# Patient Record
Sex: MALE | HISPANIC OR LATINO | Employment: FULL TIME | URBAN - METROPOLITAN AREA
[De-identification: names, ages, dates, MRNs, and addresses within clinical notes are randomized per-mention and may not be internally consistent; named-entity substitution may affect disease eponyms.]

---

## 2022-05-19 ENCOUNTER — HOSPITAL ENCOUNTER (EMERGENCY)
Facility: CLINIC | Age: 32
Discharge: HOME | End: 2022-05-19
Attending: FAMILY MEDICINE | Admitting: FAMILY MEDICINE

## 2022-05-19 ENCOUNTER — APPOINTMENT (OUTPATIENT)
Dept: CT IMAGING | Facility: CLINIC | Age: 32
DRG: 871 | End: 2022-05-19
Attending: EMERGENCY MEDICINE

## 2022-05-19 ENCOUNTER — HOSPITAL ENCOUNTER (INPATIENT)
Facility: CLINIC | Age: 32
LOS: 3 days | Discharge: HOME | DRG: 871 | End: 2022-05-23
Attending: EMERGENCY MEDICINE | Admitting: NURSE PRACTITIONER

## 2022-05-19 ENCOUNTER — APPOINTMENT (OUTPATIENT)
Dept: CT IMAGING | Facility: CLINIC | Age: 32
End: 2022-05-19
Attending: EMERGENCY MEDICINE

## 2022-05-19 VITALS
RESPIRATION RATE: 18 BRPM | OXYGEN SATURATION: 94 % | BODY MASS INDEX: 25.23 KG/M2 | DIASTOLIC BLOOD PRESSURE: 66 MMHG | HEIGHT: 66 IN | HEART RATE: 112 BPM | TEMPERATURE: 98.9 F | WEIGHT: 157 LBS | SYSTOLIC BLOOD PRESSURE: 108 MMHG

## 2022-05-19 DIAGNOSIS — E86.0 DEHYDRATION: ICD-10-CM

## 2022-05-19 DIAGNOSIS — R65.20 SEVERE SEPSIS (H): Primary | ICD-10-CM

## 2022-05-19 DIAGNOSIS — R11.2 NAUSEA AND VOMITING, INTRACTABILITY OF VOMITING NOT SPECIFIED, UNSPECIFIED VOMITING TYPE: ICD-10-CM

## 2022-05-19 DIAGNOSIS — R11.2 NON-INTRACTABLE VOMITING WITH NAUSEA, UNSPECIFIED VOMITING TYPE: ICD-10-CM

## 2022-05-19 DIAGNOSIS — G00.9 BACTERIAL MENINGITIS: ICD-10-CM

## 2022-05-19 DIAGNOSIS — A41.9 SEVERE SEPSIS (H): Primary | ICD-10-CM

## 2022-05-19 DIAGNOSIS — E87.6 HYPOPOTASSEMIA: ICD-10-CM

## 2022-05-19 DIAGNOSIS — E87.6 HYPOKALEMIA: ICD-10-CM

## 2022-05-19 DIAGNOSIS — G00.8 ANAEROBIC MENINGITIS: ICD-10-CM

## 2022-05-19 DIAGNOSIS — Z11.52 ENCOUNTER FOR SCREENING LABORATORY TESTING FOR SEVERE ACUTE RESPIRATORY SYNDROME CORONAVIRUS 2 (SARS-COV-2): ICD-10-CM

## 2022-05-19 DIAGNOSIS — R59.1 LYMPHADENOPATHY: ICD-10-CM

## 2022-05-19 LAB
ALBUMIN SERPL-MCNC: 4.3 G/DL (ref 3.4–5)
ALBUMIN UR-MCNC: NEGATIVE MG/DL
ALBUMIN UR-MCNC: NEGATIVE MG/DL
ALP SERPL-CCNC: 84 U/L (ref 40–150)
ALT SERPL W P-5'-P-CCNC: 61 U/L (ref 0–70)
ANION GAP SERPL CALCULATED.3IONS-SCNC: 7 MMOL/L (ref 3–14)
ANION GAP SERPL CALCULATED.3IONS-SCNC: 9 MMOL/L (ref 3–14)
APPEARANCE CSF: ABNORMAL
APPEARANCE UR: CLEAR
APPEARANCE UR: CLEAR
AST SERPL W P-5'-P-CCNC: 34 U/L (ref 0–45)
BASOPHILS # BLD AUTO: 0 10E3/UL (ref 0–0.2)
BASOPHILS # BLD AUTO: 0 10E3/UL (ref 0–0.2)
BASOPHILS NFR BLD AUTO: 0 %
BASOPHILS NFR BLD AUTO: 0 %
BILIRUB SERPL-MCNC: 0.6 MG/DL (ref 0.2–1.3)
BILIRUB UR QL STRIP: NEGATIVE
BILIRUB UR QL STRIP: NEGATIVE
BUN SERPL-MCNC: 13 MG/DL (ref 7–30)
BUN SERPL-MCNC: 19 MG/DL (ref 7–30)
CALCIUM SERPL-MCNC: 8.5 MG/DL (ref 8.5–10.1)
CALCIUM SERPL-MCNC: 8.8 MG/DL (ref 8.5–10.1)
CHLORIDE BLD-SCNC: 108 MMOL/L (ref 94–109)
CHLORIDE BLD-SCNC: 108 MMOL/L (ref 94–109)
CO2 SERPL-SCNC: 24 MMOL/L (ref 20–32)
CO2 SERPL-SCNC: 25 MMOL/L (ref 20–32)
COLOR CSF: COLORLESS
COLOR UR AUTO: ABNORMAL
COLOR UR AUTO: YELLOW
CREAT SERPL-MCNC: 0.88 MG/DL (ref 0.66–1.25)
CREAT SERPL-MCNC: 0.9 MG/DL (ref 0.66–1.25)
CRP SERPL-MCNC: 6.1 MG/L (ref 0–8)
CRP SERPL-MCNC: 69 MG/L (ref 0–8)
EOSINOPHIL # BLD AUTO: 0 10E3/UL (ref 0–0.7)
EOSINOPHIL # BLD AUTO: 0 10E3/UL (ref 0–0.7)
EOSINOPHIL NFR BLD AUTO: 0 %
EOSINOPHIL NFR BLD AUTO: 0 %
ERYTHROCYTE [DISTWIDTH] IN BLOOD BY AUTOMATED COUNT: 12.7 % (ref 10–15)
ERYTHROCYTE [DISTWIDTH] IN BLOOD BY AUTOMATED COUNT: 12.9 % (ref 10–15)
FLUAV RNA SPEC QL NAA+PROBE: NEGATIVE
FLUBV RNA RESP QL NAA+PROBE: NEGATIVE
GFR SERPL CREATININE-BSD FRML MDRD: >90 ML/MIN/1.73M2
GFR SERPL CREATININE-BSD FRML MDRD: >90 ML/MIN/1.73M2
GLUCOSE BLD-MCNC: 139 MG/DL (ref 70–99)
GLUCOSE BLD-MCNC: 185 MG/DL (ref 70–99)
GLUCOSE CSF-MCNC: 68 MG/DL (ref 40–70)
GLUCOSE UR STRIP-MCNC: 50 MG/DL
GLUCOSE UR STRIP-MCNC: NEGATIVE MG/DL
HBA1C MFR BLD: 5.5 % (ref 0–5.6)
HCT VFR BLD AUTO: 41.1 % (ref 40–53)
HCT VFR BLD AUTO: 42 % (ref 40–53)
HGB BLD-MCNC: 14.4 G/DL (ref 13.3–17.7)
HGB BLD-MCNC: 14.8 G/DL (ref 13.3–17.7)
HGB UR QL STRIP: NEGATIVE
HGB UR QL STRIP: NEGATIVE
IMM GRANULOCYTES # BLD: 0.1 10E3/UL
IMM GRANULOCYTES # BLD: 0.1 10E3/UL
IMM GRANULOCYTES NFR BLD: 0 %
IMM GRANULOCYTES NFR BLD: 1 %
KETONES UR STRIP-MCNC: 20 MG/DL
KETONES UR STRIP-MCNC: 5 MG/DL
LACTATE SERPL-SCNC: 1.1 MMOL/L (ref 0.7–2)
LACTATE SERPL-SCNC: 1.3 MMOL/L (ref 0.7–2)
LEUKOCYTE ESTERASE UR QL STRIP: NEGATIVE
LEUKOCYTE ESTERASE UR QL STRIP: NEGATIVE
LIPASE SERPL-CCNC: 81 U/L (ref 73–393)
LYMPH ABN NFR CSF MANUAL: 4 %
LYMPHOCYTES # BLD AUTO: 0.5 10E3/UL (ref 0.8–5.3)
LYMPHOCYTES # BLD AUTO: 0.7 10E3/UL (ref 0.8–5.3)
LYMPHOCYTES NFR BLD AUTO: 3 %
LYMPHOCYTES NFR BLD AUTO: 5 %
MAGNESIUM SERPL-MCNC: 2 MG/DL (ref 1.6–2.3)
MCH RBC QN AUTO: 31.6 PG (ref 26.5–33)
MCH RBC QN AUTO: 31.7 PG (ref 26.5–33)
MCHC RBC AUTO-ENTMCNC: 35 G/DL (ref 31.5–36.5)
MCHC RBC AUTO-ENTMCNC: 35.2 G/DL (ref 31.5–36.5)
MCV RBC AUTO: 90 FL (ref 78–100)
MCV RBC AUTO: 91 FL (ref 78–100)
MONOCYTES # BLD AUTO: 0.5 10E3/UL (ref 0–1.3)
MONOCYTES # BLD AUTO: 0.8 10E3/UL (ref 0–1.3)
MONOCYTES NFR BLD AUTO: 3 %
MONOCYTES NFR BLD AUTO: 4 %
MONOS+MACROS NFR CSF MANUAL: 7 %
MUCOUS THREADS #/AREA URNS LPF: PRESENT /LPF
NEUTROPHILS # BLD AUTO: 14.4 10E3/UL (ref 1.6–8.3)
NEUTROPHILS # BLD AUTO: 15.9 10E3/UL (ref 1.6–8.3)
NEUTROPHILS NFR BLD AUTO: 92 %
NEUTROPHILS NFR BLD AUTO: 92 %
NEUTROPHILS NFR CSF MANUAL: 89 %
NITRATE UR QL: NEGATIVE
NITRATE UR QL: NEGATIVE
NRBC # BLD AUTO: 0 10E3/UL
NRBC # BLD AUTO: 0 10E3/UL
NRBC BLD AUTO-RTO: 0 /100
NRBC BLD AUTO-RTO: 0 /100
PH UR STRIP: 6 [PH] (ref 5–7)
PH UR STRIP: 7 [PH] (ref 5–7)
PLATELET # BLD AUTO: 229 10E3/UL (ref 150–450)
PLATELET # BLD AUTO: 249 10E3/UL (ref 150–450)
POTASSIUM BLD-SCNC: 3.3 MMOL/L (ref 3.4–5.3)
POTASSIUM BLD-SCNC: 3.3 MMOL/L (ref 3.4–5.3)
PROT CSF-MCNC: 201 MG/DL (ref 15–60)
PROT SERPL-MCNC: 7.4 G/DL (ref 6.8–8.8)
RBC # BLD AUTO: 4.54 10E6/UL (ref 4.4–5.9)
RBC # BLD AUTO: 4.68 10E6/UL (ref 4.4–5.9)
RBC # CSF MANUAL: 29 /UL (ref 0–2)
RBC URINE: <1 /HPF
RBC URINE: <1 /HPF
SARS-COV-2 RNA RESP QL NAA+PROBE: NEGATIVE
SODIUM SERPL-SCNC: 140 MMOL/L (ref 133–144)
SODIUM SERPL-SCNC: 141 MMOL/L (ref 133–144)
SP GR UR STRIP: 1.01 (ref 1–1.03)
SP GR UR STRIP: 1.02 (ref 1–1.03)
SQUAMOUS EPITHELIAL: <1 /HPF
TUBE # CSF: 1
UROBILINOGEN UR STRIP-MCNC: NORMAL MG/DL
UROBILINOGEN UR STRIP-MCNC: NORMAL MG/DL
WBC # BLD AUTO: 15.7 10E3/UL (ref 4–11)
WBC # BLD AUTO: 17.3 10E3/UL (ref 4–11)
WBC # CSF MANUAL: 923 /UL (ref 0–5)
WBC URINE: 1 /HPF
WBC URINE: <1 /HPF

## 2022-05-19 PROCEDURE — 89051 BODY FLUID CELL COUNT: CPT | Performed by: EMERGENCY MEDICINE

## 2022-05-19 PROCEDURE — 250N000011 HC RX IP 250 OP 636: Performed by: RADIOLOGY

## 2022-05-19 PROCEDURE — 83605 ASSAY OF LACTIC ACID: CPT | Performed by: FAMILY MEDICINE

## 2022-05-19 PROCEDURE — 87483 CNS DNA AMP PROBE TYPE 12-25: CPT | Performed by: EMERGENCY MEDICINE

## 2022-05-19 PROCEDURE — 258N000003 HC RX IP 258 OP 636: Performed by: EMERGENCY MEDICINE

## 2022-05-19 PROCEDURE — 250N000011 HC RX IP 250 OP 636: Performed by: FAMILY MEDICINE

## 2022-05-19 PROCEDURE — 99292 CRITICAL CARE ADDL 30 MIN: CPT | Mod: 25 | Performed by: EMERGENCY MEDICINE

## 2022-05-19 PROCEDURE — 81001 URINALYSIS AUTO W/SCOPE: CPT | Performed by: FAMILY MEDICINE

## 2022-05-19 PROCEDURE — 99285 EMERGENCY DEPT VISIT HI MDM: CPT | Mod: 25

## 2022-05-19 PROCEDURE — 83605 ASSAY OF LACTIC ACID: CPT | Performed by: EMERGENCY MEDICINE

## 2022-05-19 PROCEDURE — 86140 C-REACTIVE PROTEIN: CPT | Performed by: EMERGENCY MEDICINE

## 2022-05-19 PROCEDURE — 82040 ASSAY OF SERUM ALBUMIN: CPT | Performed by: FAMILY MEDICINE

## 2022-05-19 PROCEDURE — 96366 THER/PROPH/DIAG IV INF ADDON: CPT | Performed by: EMERGENCY MEDICINE

## 2022-05-19 PROCEDURE — 85025 COMPLETE CBC W/AUTO DIFF WBC: CPT | Performed by: EMERGENCY MEDICINE

## 2022-05-19 PROCEDURE — 99291 CRITICAL CARE FIRST HOUR: CPT | Mod: 25 | Performed by: EMERGENCY MEDICINE

## 2022-05-19 PROCEDURE — 83690 ASSAY OF LIPASE: CPT | Performed by: FAMILY MEDICINE

## 2022-05-19 PROCEDURE — 96361 HYDRATE IV INFUSION ADD-ON: CPT | Performed by: EMERGENCY MEDICINE

## 2022-05-19 PROCEDURE — 36415 COLL VENOUS BLD VENIPUNCTURE: CPT | Performed by: FAMILY MEDICINE

## 2022-05-19 PROCEDURE — 96367 TX/PROPH/DG ADDL SEQ IV INF: CPT | Performed by: EMERGENCY MEDICINE

## 2022-05-19 PROCEDURE — 250N000011 HC RX IP 250 OP 636: Performed by: EMERGENCY MEDICINE

## 2022-05-19 PROCEDURE — 009U3ZX DRAINAGE OF SPINAL CANAL, PERCUTANEOUS APPROACH, DIAGNOSTIC: ICD-10-PCS | Performed by: EMERGENCY MEDICINE

## 2022-05-19 PROCEDURE — 80053 COMPREHEN METABOLIC PANEL: CPT | Performed by: FAMILY MEDICINE

## 2022-05-19 PROCEDURE — 96375 TX/PRO/DX INJ NEW DRUG ADDON: CPT | Performed by: EMERGENCY MEDICINE

## 2022-05-19 PROCEDURE — 250N000009 HC RX 250: Performed by: EMERGENCY MEDICINE

## 2022-05-19 PROCEDURE — 62270 DX LMBR SPI PNXR: CPT | Performed by: EMERGENCY MEDICINE

## 2022-05-19 PROCEDURE — 250N000009 HC RX 250: Performed by: RADIOLOGY

## 2022-05-19 PROCEDURE — 85025 COMPLETE CBC W/AUTO DIFF WBC: CPT | Performed by: FAMILY MEDICINE

## 2022-05-19 PROCEDURE — 84157 ASSAY OF PROTEIN OTHER: CPT | Performed by: EMERGENCY MEDICINE

## 2022-05-19 PROCEDURE — 96361 HYDRATE IV INFUSION ADD-ON: CPT

## 2022-05-19 PROCEDURE — 99285 EMERGENCY DEPT VISIT HI MDM: CPT | Performed by: FAMILY MEDICINE

## 2022-05-19 PROCEDURE — 70450 CT HEAD/BRAIN W/O DYE: CPT

## 2022-05-19 PROCEDURE — 36415 COLL VENOUS BLD VENIPUNCTURE: CPT | Performed by: EMERGENCY MEDICINE

## 2022-05-19 PROCEDURE — 71260 CT THORAX DX C+: CPT

## 2022-05-19 PROCEDURE — 83036 HEMOGLOBIN GLYCOSYLATED A1C: CPT | Performed by: EMERGENCY MEDICINE

## 2022-05-19 PROCEDURE — C9803 HOPD COVID-19 SPEC COLLECT: HCPCS

## 2022-05-19 PROCEDURE — 83735 ASSAY OF MAGNESIUM: CPT | Performed by: EMERGENCY MEDICINE

## 2022-05-19 PROCEDURE — 87636 SARSCOV2 & INF A&B AMP PRB: CPT | Performed by: FAMILY MEDICINE

## 2022-05-19 PROCEDURE — 96365 THER/PROPH/DIAG IV INF INIT: CPT | Mod: 59 | Performed by: EMERGENCY MEDICINE

## 2022-05-19 PROCEDURE — 87205 SMEAR GRAM STAIN: CPT | Performed by: EMERGENCY MEDICINE

## 2022-05-19 PROCEDURE — 87102 FUNGUS ISOLATION CULTURE: CPT | Performed by: NURSE PRACTITIONER

## 2022-05-19 PROCEDURE — 96375 TX/PRO/DX INJ NEW DRUG ADDON: CPT

## 2022-05-19 PROCEDURE — 82945 GLUCOSE OTHER FLUID: CPT | Performed by: EMERGENCY MEDICINE

## 2022-05-19 PROCEDURE — 74177 CT ABD & PELVIS W/CONTRAST: CPT

## 2022-05-19 PROCEDURE — 87899 AGENT NOS ASSAY W/OPTIC: CPT | Performed by: NURSE PRACTITIONER

## 2022-05-19 PROCEDURE — 96374 THER/PROPH/DIAG INJ IV PUSH: CPT | Mod: 59

## 2022-05-19 PROCEDURE — 87389 HIV-1 AG W/HIV-1&-2 AB AG IA: CPT | Performed by: NURSE PRACTITIONER

## 2022-05-19 PROCEDURE — 87040 BLOOD CULTURE FOR BACTERIA: CPT | Mod: XS | Performed by: EMERGENCY MEDICINE

## 2022-05-19 PROCEDURE — 258N000003 HC RX IP 258 OP 636: Performed by: FAMILY MEDICINE

## 2022-05-19 PROCEDURE — 81001 URINALYSIS AUTO W/SCOPE: CPT | Performed by: EMERGENCY MEDICINE

## 2022-05-19 RX ORDER — CEFTRIAXONE 1 G/1
1 INJECTION, POWDER, FOR SOLUTION INTRAMUSCULAR; INTRAVENOUS ONCE
Status: COMPLETED | OUTPATIENT
Start: 2022-05-19 | End: 2022-05-19

## 2022-05-19 RX ORDER — ONDANSETRON 4 MG/1
4 TABLET, ORALLY DISINTEGRATING ORAL EVERY 6 HOURS PRN
Qty: 15 TABLET | Refills: 0 | Status: ON HOLD | OUTPATIENT
Start: 2022-05-19 | End: 2022-11-07

## 2022-05-19 RX ORDER — ONDANSETRON 2 MG/ML
4 INJECTION INTRAMUSCULAR; INTRAVENOUS
Status: COMPLETED | OUTPATIENT
Start: 2022-05-19 | End: 2022-05-19

## 2022-05-19 RX ORDER — SODIUM CHLORIDE 9 MG/ML
INJECTION, SOLUTION INTRAVENOUS CONTINUOUS
Status: DISCONTINUED | OUTPATIENT
Start: 2022-05-19 | End: 2022-05-19 | Stop reason: HOSPADM

## 2022-05-19 RX ORDER — DEXAMETHASONE SODIUM PHOSPHATE 10 MG/ML
10 INJECTION, SOLUTION INTRAMUSCULAR; INTRAVENOUS ONCE
Status: COMPLETED | OUTPATIENT
Start: 2022-05-19 | End: 2022-05-19

## 2022-05-19 RX ORDER — POTASSIUM CHLORIDE 7.45 MG/ML
10 INJECTION INTRAVENOUS CONTINUOUS
Status: DISCONTINUED | OUTPATIENT
Start: 2022-05-19 | End: 2022-05-20

## 2022-05-19 RX ORDER — KETOROLAC TROMETHAMINE 15 MG/ML
15 INJECTION, SOLUTION INTRAMUSCULAR; INTRAVENOUS ONCE
Status: COMPLETED | OUTPATIENT
Start: 2022-05-19 | End: 2022-05-19

## 2022-05-19 RX ORDER — IOPAMIDOL 755 MG/ML
500 INJECTION, SOLUTION INTRAVASCULAR ONCE
Status: COMPLETED | OUTPATIENT
Start: 2022-05-19 | End: 2022-05-19

## 2022-05-19 RX ORDER — KETOROLAC TROMETHAMINE 30 MG/ML
30 INJECTION, SOLUTION INTRAMUSCULAR; INTRAVENOUS ONCE
Status: COMPLETED | OUTPATIENT
Start: 2022-05-19 | End: 2022-05-19

## 2022-05-19 RX ORDER — HYDROMORPHONE HYDROCHLORIDE 1 MG/ML
0.5 INJECTION, SOLUTION INTRAMUSCULAR; INTRAVENOUS; SUBCUTANEOUS ONCE
Status: COMPLETED | OUTPATIENT
Start: 2022-05-19 | End: 2022-05-19

## 2022-05-19 RX ORDER — SODIUM CHLORIDE 9 MG/ML
150 INJECTION, SOLUTION INTRAVENOUS CONTINUOUS
Status: DISCONTINUED | OUTPATIENT
Start: 2022-05-19 | End: 2022-05-22

## 2022-05-19 RX ADMIN — POTASSIUM CHLORIDE 10 MEQ: 7.46 INJECTION, SOLUTION INTRAVENOUS at 21:08

## 2022-05-19 RX ADMIN — KETOROLAC TROMETHAMINE 30 MG: 30 INJECTION, SOLUTION INTRAMUSCULAR at 06:47

## 2022-05-19 RX ADMIN — SODIUM CHLORIDE 500 ML: 9 INJECTION, SOLUTION INTRAVENOUS at 06:40

## 2022-05-19 RX ADMIN — POTASSIUM CHLORIDE 10 MEQ: 7.46 INJECTION, SOLUTION INTRAVENOUS at 23:30

## 2022-05-19 RX ADMIN — SODIUM CHLORIDE 75 ML: 9 INJECTION, SOLUTION INTRAVENOUS at 10:46

## 2022-05-19 RX ADMIN — SODIUM CHLORIDE 2136 ML: 9 INJECTION, SOLUTION INTRAVENOUS at 19:04

## 2022-05-19 RX ADMIN — ONDANSETRON 4 MG: 2 INJECTION INTRAMUSCULAR; INTRAVENOUS at 06:40

## 2022-05-19 RX ADMIN — IOPAMIDOL 50 ML: 755 INJECTION, SOLUTION INTRAVENOUS at 10:46

## 2022-05-19 RX ADMIN — IOPAMIDOL 77 ML: 755 INJECTION, SOLUTION INTRAVENOUS at 08:57

## 2022-05-19 RX ADMIN — KETOROLAC TROMETHAMINE 15 MG: 15 INJECTION, SOLUTION INTRAMUSCULAR; INTRAVENOUS at 20:29

## 2022-05-19 RX ADMIN — SODIUM CHLORIDE 1000 ML: 9 INJECTION, SOLUTION INTRAVENOUS at 06:45

## 2022-05-19 RX ADMIN — SODIUM CHLORIDE 500 ML: 9 INJECTION, SOLUTION INTRAVENOUS at 08:19

## 2022-05-19 RX ADMIN — SODIUM CHLORIDE 60 ML: 9 INJECTION, SOLUTION INTRAVENOUS at 08:58

## 2022-05-19 RX ADMIN — VANCOMYCIN HYDROCHLORIDE 1500 MG: 1 INJECTION, POWDER, LYOPHILIZED, FOR SOLUTION INTRAVENOUS at 19:07

## 2022-05-19 RX ADMIN — ACYCLOVIR SODIUM 700 MG: 50 INJECTION, SOLUTION INTRAVENOUS at 21:00

## 2022-05-19 RX ADMIN — SODIUM CHLORIDE 150 ML/HR: 9 INJECTION, SOLUTION INTRAVENOUS at 20:18

## 2022-05-19 RX ADMIN — HYDROMORPHONE HYDROCHLORIDE 0.5 MG: 1 INJECTION, SOLUTION INTRAMUSCULAR; INTRAVENOUS; SUBCUTANEOUS at 08:22

## 2022-05-19 RX ADMIN — DEXAMETHASONE SODIUM PHOSPHATE 10 MG: 10 INJECTION, SOLUTION INTRAMUSCULAR; INTRAVENOUS at 20:27

## 2022-05-19 RX ADMIN — CEFTRIAXONE SODIUM 1 G: 1 INJECTION, POWDER, FOR SOLUTION INTRAMUSCULAR; INTRAVENOUS at 20:17

## 2022-05-19 RX ADMIN — POTASSIUM CHLORIDE 10 MEQ: 7.46 INJECTION, SOLUTION INTRAVENOUS at 22:16

## 2022-05-19 ASSESSMENT — ENCOUNTER SYMPTOMS
DIARRHEA: 0
ARTHRALGIAS: 0
DIFFICULTY URINATING: 0
NECK PAIN: 1
NECK STIFFNESS: 1
NAUSEA: 1
SHORTNESS OF BREATH: 0
SHORTNESS OF BREATH: 0
HEADACHES: 1
COUGH: 0
HEADACHES: 1
PALPITATIONS: 0
CHEST TIGHTNESS: 0
DYSURIA: 0
FLANK PAIN: 0
NAUSEA: 1
WEAKNESS: 1
ABDOMINAL PAIN: 1
BACK PAIN: 1
CHILLS: 1
COUGH: 0
VOMITING: 1
VOMITING: 1
FEVER: 1

## 2022-05-19 ASSESSMENT — VISUAL ACUITY: OU: 1

## 2022-05-19 NOTE — ED PROVIDER NOTES
"  History     Chief Complaint   Patient presents with     Nausea, Vomiting, & Diarrhea     Headache     Back Pain     Low back pain     HPI  David Huang is a 32 year old male who presents to the ED this morning with nausea and vomiting that started about 1:00 this morning.  History is obtained through an .  States that he with nausea vomiting a headache and body aches and backache.  Felt febrile but did not check his temperature.  No diarrhea.  No one else at home is sick.  Had some instant soup to eat last night.  No abdominal surgeries.  No heart or lung disease.  No diabetes.  Does not take any regular medicines.  Took some Tylenol and naproxen tonight when he did not feel well.  No previous stomach issues.        Allergies:  No Known Allergies    Problem List:    There are no problems to display for this patient.       Past Medical History:    Past Medical History:   Diagnosis Date     Heart murmur        Past Surgical History:    History reviewed. No pertinent surgical history.    Family History:    No family history on file.    Social History:  Marital Status:          Medications:    No current outpatient medications on file.        Review of Systems   Constitutional: Positive for fever ( subjective).   Respiratory: Negative for cough and shortness of breath.    Cardiovascular: Negative for chest pain.   Gastrointestinal: Positive for abdominal pain, nausea and vomiting. Negative for diarrhea.   Genitourinary: Negative for difficulty urinating.   Musculoskeletal: Positive for back pain.   Skin: Negative for rash.   Neurological: Positive for weakness ( generalized) and headaches.   All other systems reviewed and are negative.      Physical Exam   BP: 118/62  Pulse: (!) 121  Temp: 99.2  F (37.3  C)  Resp: 18  Height: 167.6 cm (5' 6\")  SpO2: 95 %      Physical Exam  Constitutional:       General: He is in acute distress ( mild).      Appearance: Normal appearance.   HENT:      Head: " Normocephalic and atraumatic.      Mouth/Throat:      Comments: Oral mucosa is tacky.  Eyes:      Extraocular Movements: Extraocular movements intact.   Cardiovascular:      Rate and Rhythm: Regular rhythm. Tachycardia present.   Pulmonary:      Effort: Pulmonary effort is normal.      Breath sounds: Normal breath sounds.   Abdominal:      Palpations: Abdomen is soft.      Tenderness: There is abdominal tenderness ( mild epigastric). There is no right CVA tenderness, left CVA tenderness, guarding or rebound.   Musculoskeletal:         General: Normal range of motion.      Right lower leg: No edema.      Left lower leg: No edema.   Skin:     General: Skin is warm and dry.   Neurological:      General: No focal deficit present.      Mental Status: He is alert and oriented to person, place, and time.   Psychiatric:         Mood and Affect: Mood normal.         ED Course                 Procedures              Critical Care time:  none               No results found for this or any previous visit (from the past 24 hour(s)).    Medications   0.9% sodium chloride BOLUS (1,000 mLs Intravenous New Bag 5/19/22 0645)     Followed by   sodium chloride 0.9% infusion (has no administration in time range)   0.9% sodium chloride BOLUS (0 mLs Intravenous Stopped 5/19/22 0645)   ondansetron (ZOFRAN) injection 4 mg (4 mg Intravenous Given 5/19/22 0640)   ketorolac (TORADOL) injection 30 mg (30 mg Intravenous Given 5/19/22 0647)       Assessments & Plan (with Medical Decision Making)  32-year-old with approximately 6-hour history of nausea and vomiting associated with headache, body aches and low backache.  Otherwise in good health.  Takes no chronic medications.  No abdominal surgeries.  No known exposures.    IV placed.  Toradol for the body aches and backache, Zofran for nausea.  1 L normal saline.  Labs sent.  Dr. Payne will assume his care at change of shift.  We will follow-up on his lab results and response to treatment.  See  Dr. Payne's note for final diagnosis and disposition.       I have reviewed the nursing notes.    I have reviewed the findings, diagnosis, plan and need for follow up with the patient.       New Prescriptions    No medications on file       Final diagnoses:   Non-intractable vomiting with nausea, unspecified vomiting type       5/19/2022   [facility] EMERGENCY DEPT     Clayton Gallo MD  05/19/22 0673

## 2022-05-19 NOTE — ED TRIAGE NOTES
Pt here after fall with headache vs head trauma/injury 3-4 hours ago. Difficult time triaging due to language barrier. Pt non English speaking, visitor only speaks little bit of English. Requesting . Here this AM per discharge instructions for dehydration and lymphadenopathy.     Triage Assessment     Row Name 05/19/22 1800       Triage Assessment (Adult)    Airway WDL WDL       Respiratory WDL    Respiratory WDL WDL       Skin Circulation/Temperature WDL    Skin Circulation/Temperature WDL WDL       Cardiac WDL    Cardiac WDL WDL       Peripheral/Neurovascular WDL    Peripheral Neurovascular WDL WDL       Cognitive/Neuro/Behavioral WDL    Cognitive/Neuro/Behavioral WDL X;mood/behavior    Level of Consciousness lethargic       Pj Coma Scale    Best Eye Response 4-->(E4) spontaneous    Best Motor Response 6-->(M6) obeys commands    Best Verbal Response 4-->(V4) confused    Pj Coma Scale Score 14

## 2022-05-19 NOTE — ED NOTES
Pt states lower back pain is coming back and his joints hurt. They normally do not hurt.  His abdomen feels better.  Dr. Payne updated.

## 2022-05-19 NOTE — ED PROVIDER NOTES
History     Chief Complaint   Patient presents with     Headache     HPI  David Huang is a 32 year old male who returns today to the emergency department due to concern of deterioration.  Patient seen earlier in the day with multiple episodes of nausea and vomiting.  Had a fairly thorough work-up including CT imaging of the abdomen pelvis demonstrate no obvious cause.  Patient noted have an elevated white blood cell count at that time dismissed with close plans for observation.  Patient does return with his family member who is noted rapid worsening of symptoms.  Patient now reports 9 of 10 headache and 9 of 10 neck stiffness.  He is continue to have nausea and vomiting.  He feels fatigue and chills.  He reports no other specific symptoms including change in vision, unilateral change in strength or sensation.  No recent head trauma.  He reports no shortness of breath, palpitations.  He denies recent nasal congestion, rhinorrhea, sore throat.  Patient reports no loss of taste or smell.  He denies overseas travel or recent tick exposure.  No history of meningitis.  No history of aneurysmal disease.    Allergies:  No Known Allergies    Problem List:    There are no problems to display for this patient.       Past Medical History:    Past Medical History:   Diagnosis Date     Heart murmur        Past Surgical History:    No past surgical history on file.    Family History:    No family history on file.    Social History:  Marital Status:  Single [1]        Medications:    ondansetron (ZOFRAN ODT) 4 MG ODT tab          Review of Systems   Constitutional: Positive for chills.   Respiratory: Negative for cough, chest tightness and shortness of breath.    Cardiovascular: Negative for palpitations and leg swelling.   Gastrointestinal: Positive for nausea and vomiting.   Genitourinary: Negative for dysuria and flank pain.   Musculoskeletal: Positive for neck pain and neck stiffness. Negative for arthralgias.  "  Neurological: Positive for headaches.   All other systems reviewed and are negative.      Physical Exam   BP: 114/70  Pulse: 116  Temp: 98.2  F (36.8  C)  Resp: 18  Height: 167.6 cm (5' 6\")  Weight: 71.2 kg (157 lb)  SpO2: 99 %      Physical Exam  Vitals and nursing note reviewed.   Constitutional:       General: He is in acute distress.      Appearance: Normal appearance. He is ill-appearing.   HENT:      Head: Normocephalic and atraumatic.      Right Ear: External ear normal.      Left Ear: External ear normal.      Ears:      Comments: No lesions     Nose: Nose normal. No congestion.      Mouth/Throat:      Mouth: Mucous membranes are moist. No oral lesions.      Pharynx: No oropharyngeal exudate.   Eyes:      General: Lids are normal. Vision grossly intact.      Conjunctiva/sclera:      Right eye: Right conjunctiva is injected.      Left eye: Left conjunctiva is injected.      Comments: Pupils 2 mm b/l   Neck:      Comments: No jase rigidity but discomfort and stiffness in the superior neck.  Cardiovascular:      Rate and Rhythm: Tachycardia present.      Pulses: Normal pulses.   Pulmonary:      Effort: Pulmonary effort is normal. No respiratory distress.      Breath sounds: No wheezing.   Abdominal:      General: Abdomen is flat. There is no distension.      Tenderness: There is no abdominal tenderness. There is no guarding.   Musculoskeletal:      Cervical back: No rigidity. Pain with movement present. No spinous process tenderness. Decreased range of motion.   Skin:     General: Skin is warm and dry.      Capillary Refill: Capillary refill takes less than 2 seconds.      Findings: No bruising or rash.   Neurological:      General: No focal deficit present.      Mental Status: He is alert.      Comments: Patient oriented at this time.  No obvious gaze preference or deviation.  No focal neurologic deficit.  Strength and sensation full.  Patient does have discomfort of the neck and some flexion at his hips. "   Psychiatric:         Cognition and Memory: Cognition and memory normal.         ED [facility]    -Lumbar Puncture    Date/Time: 5/19/2022 7:58 PM  Performed by: Maicol Baer MD  Authorized by: Maicol Baer MD     Risks, benefits and alternatives discussed.      PRE-PROCEDURE DETAILS:     Procedure purpose:  Diagnostic    ANESTHESIA (see MAR for exact dosages):     Anesthesia method:  Local infiltration    Local anesthetic:  Lidocaine 1% w/o epi      PROCEDURE DETAILS:     Lumbar space:  L4-L5 interspace    Needle gauge:  22    Needle type:  Spinal needle - Quincke tip    Needle length (in):  3.5    Ultrasound guidance: no      Number of attempts:  1    Tubes of fluid:  4    Total volume (ml):  7    POST-PROCEDURE:     Puncture site:  Adhesive bandage applied        PROCEDURE    Patient Tolerance:  Patient tolerated the procedure well with no immediate complications          Critical Care time:  was 60 minutes for this patient excluding procedures.  Critical care time includes evaluation and examining the patient, updating family/friend, review of imaging and laboratory studies, review of records, ordering and initiating antibiotics, antiviral medication, fluid bolus and management of sepsis.  On reevaluation after fluids the patient remains ill in appearance.  He is oriented.  Capillary refill 2 seconds.  He remains tachycardic but adequately perfusing at this time.         Results for orders placed or performed during the hospital encounter of 05/19/22 (from the past 24 hour(s))   CBC with Platelets & Differential    Narrative    The following orders were created for panel order CBC with Platelets & Differential.  Procedure                               Abnormality         Status                     ---------                               -----------         ------                     CBC with platelets and d...[[id number]]  Abnormal             Final result                 Please view results for these tests on the individual orders.   Lipase   Result Value Ref Range    Lipase 81 73 - 393 U/L   Comprehensive metabolic panel   Result Value Ref Range    Sodium 141 133 - 144 mmol/L    Potassium 3.3 (L) 3.4 - 5.3 mmol/L    Chloride 108 94 - 109 mmol/L    Carbon Dioxide (CO2) 24 20 - 32 mmol/L    Anion Gap 9 3 - 14 mmol/L    Urea Nitrogen 19 7 - 30 mg/dL    Creatinine 0.90 0.66 - 1.25 mg/dL    Calcium 8.8 8.5 - 10.1 mg/dL    Glucose 185 (H) 70 - 99 mg/dL    Alkaline Phosphatase 84 40 - 150 U/L    AST 34 0 - 45 U/L    ALT 61 0 - 70 U/L    Protein Total 7.4 6.8 - 8.8 g/dL    Albumin 4.3 3.4 - 5.0 g/dL    Bilirubin Total 0.6 0.2 - 1.3 mg/dL    GFR Estimate >90 >60 mL/min/1.73m2   CBC with platelets and differential   Result Value Ref Range    WBC Count 17.3 (H) 4.0 - 11.0 10e3/uL    RBC Count 4.68 4.40 - 5.90 10e6/uL    Hemoglobin 14.8 13.3 - 17.7 g/dL    Hematocrit 42.0 40.0 - 53.0 %    MCV 90 78 - 100 fL    MCH 31.6 26.5 - 33.0 pg    MCHC 35.2 31.5 - 36.5 g/dL    RDW 12.7 10.0 - 15.0 %    Platelet Count 229 150 - 450 10e3/uL    % Neutrophils 92 %    % Lymphocytes 3 %    % Monocytes 4 %    % Eosinophils 0 %    % Basophils 0 %    % Immature Granulocytes 1 %    NRBCs per 100 WBC 0 <1 /100    Absolute Neutrophils 15.9 (H) 1.6 - 8.3 10e3/uL    Absolute Lymphocytes 0.5 (L) 0.8 - 5.3 10e3/uL    Absolute Monocytes 0.8 0.0 - 1.3 10e3/uL    Absolute Eosinophils 0.0 0.0 - 0.7 10e3/uL    Absolute Basophils 0.0 0.0 - 0.2 10e3/uL    Absolute Immature Granulocytes 0.1 <=0.4 10e3/uL    Absolute NRBCs 0.0 10e3/uL   Hemoglobin A1c   Result Value Ref Range    Hemoglobin A1C 5.5 0.0 - 5.6 %   CRP inflammation   Result Value Ref Range    CRP Inflammation 6.1 0.0 - 8.0 mg/L   Symptomatic; Yes; 5/19/2022 Influenza A/B & SARS-CoV2 (COVID-19) Virus PCR Multiplex Nose    Specimen: Nose; Swab   Result Value Ref Range    Influenza A PCR Negative Negative    Influenza B PCR Negative  Negative    SARS CoV2 PCR Negative Negative    Narrative    Testing was performed using the carole SARS-CoV-2 & Influenza A/B Assay on the carole [employer] System. This test should be ordered for the detection of SARS-CoV-2 and influenza viruses in individuals who meet clinical and/or epidemiological criteria. Test performance is unknown in asymptomatic patients. This test is for in vitro diagnostic use under the FDA EUA for laboratories certified under CLIA to perform moderate and/or high complexity testing. This test has not been FDA cleared or approved. A negative result does not rule out the presence of PCR inhibitors in the specimen or target RNA in concentration below the limit of detection for the assay. If only one viral target is positive but coinfection with multiple targets is suspected, the sample should be re-tested with another FDA cleared, approved or authorized test, if coinfection would change clinical management. [facility] [employer] are certified under the Clinical Laboratory Improvement Amendments of 1988 (CLIA-88) as  qualified to perform moderate and/or high complexity laboratory testing.   Lactic acid whole blood   Result Value Ref Range    Lactic Acid 1.3 0.7 - 2.0 mmol/L   UA with Microscopic reflex to Culture    Specimen: Urine, NOS   Result Value Ref Range    Color Urine Yellow Colorless, Straw, Light Yellow, Yellow    Appearance Urine Clear Clear    Glucose Urine 50  (A) Negative mg/dL    Bilirubin Urine Negative Negative    Ketones Urine 20  (A) Negative mg/dL    Specific Gravity Urine 1.024 1.003 - 1.035    Blood Urine Negative Negative    pH Urine 7.0 5.0 - 7.0    Protein Albumin Urine Negative Negative mg/dL    Urobilinogen Urine Normal Normal, 2.0 mg/dL    Nitrite Urine Negative Negative    Leukocyte Esterase Urine Negative Negative    Mucus Urine Present (A) None Seen /LPF    RBC Urine <1 <=2 /HPF    WBC Urine 1 <=5 /HPF    Squamous Epithelials Urine <1 <=1 /HPF    Narrative     Urine Culture not indicated   CT Abdomen Pelvis w Contrast    Narrative    CT ABDOMEN PELVIS W CONTRAST 5/19/2022 9:09 AM    CLINICAL HISTORY: Abdominal pain, fever    TECHNIQUE: CT scan of the abdomen and pelvis was performed following  injection of IV contrast. Multiplanar reformats were obtained. Dose  reduction techniques were used.  CONTRAST: 77mL Isovue-370    COMPARISON: None.    FINDINGS:   LOWER CHEST: Subcarinal and right hilar lymph nodes measuring up to 14  mm in short axis. Dependent atelectasis.    HEPATOBILIARY: Normal.    PANCREAS: Normal.    SPLEEN: Normal measuring 10 cm in length.    ADRENAL GLANDS: Normal.    KIDNEYS/BLADDER: Normal.    BOWEL: Normal.    PELVIC ORGANS: Normal.    ADDITIONAL FINDINGS: There is hermila hepatis, gastrohepatic ligament  and portacaval lymphadenopathy with the largest hermila hepatis lymph  node measuring 30 x 15 mm (series 3 image 60). No ascites.    MUSCULOSKELETAL: Normal.      Impression    IMPRESSION:   1.  Indeterminate upper abdominal and partially imaged lower thoracic  lymphadenopathy. Differentials include lymphoproliferative disease,  metastatic and reactive lymph nodes. Could consider chest CT to fully  assess thoracic lymph nodes. Recommend either short-term follow-up CT  in couple weeks or PET/CT for further evaluation.  2.  No other finding to explain abdominal pain/fever.    YUE PAZ MD         SYSTEM ID:  [id number]   CT Chest w Contrast    Narrative    CT CHEST W CONTRAST 5/19/2022 10:53 AM    CLINICAL HISTORY: Lymphadenopathy.  TECHNIQUE: CT chest with IV contrast. Multiplanar reformats were  obtained. Dose reduction techniques were used.    CONTRAST: 50 mL Isovue-370    COMPARISON: None.    FINDINGS:   LUNGS AND PLEURA: Mild dependent atelectasis in the lung bases. No  acute airspace opacity. Few indeterminate small pulmonary nodules with  the representative examples on series 4 include: 3 mm in the left  upper lobe (image 115), 4 mm in the  lateral left lower lobe (image  154) and 3 mm in the subpleural right middle lobe (image 144). No  pleural effusion.    MEDIASTINUM/AXILLAE: Subcarinal lymph node measures 14 mm. Right  infrahilar lymph node measures 14 mm. Right paratracheal lymph node  measures 10 mm. No supraclavicular lymphadenopathy. Residual thymic  tissue in the anterior mediastinum. No aortic aneurysm.    CORONARY ARTERY CALCIFICATION: Cannot evaluate.    UPPER ABDOMEN: Upper abdominal lymphadenopathy is again seen with the  largest hermila hepatis lymph node measuring 15 x 30 mm (3/131).    MUSCULOSKELETAL: Unremarkable.      Impression    IMPRESSION:   1.  Indeterminate lower thoracic and upper abdominal lymphadenopathy.  Differentials include lymphoproliferative disease, metastasis or  reactive lymph nodes. Recommend follow-up CT or PET/CT in a few weeks.  2.  Indeterminate pulmonary nodules which measure up to 4 mm.    YUE PAZ MD         SYSTEM ID:  [id number]       Medications   0.9% sodium chloride BOLUS (has no administration in time range)   0.9% sodium chloride BOLUS (has no administration in time range)     Followed by   sodium chloride 0.9% infusion (has no administration in time range)   cefTRIAXone (ROCEPHIN) 1 g vial to attach to  mL bag for ADULTS or NS 50 mL bag for PEDS (has no administration in time range)       Assessments & Plan (with Medical Decision Making)     I have reviewed the nursing notes.    I have reviewed the findings, diagnosis, plan and need for follow up with the patient.    David Huang is a 32 year old male who returns today to the emergency department due to concern of deterioration.   On my evaluation from the door this patient appears quite ill.  He is lying in the left lateral decubitus position with his knees pulled up.  He appears uncomfortable.  Externally has no signs of trauma to the head or neck.  He does open his eyes to voice but appears mildly somnolent.  He is oriented and  answers question appropriately.  He has no obvious neurovascular deficit.  My clinical suspicion for acute intracranial bleed would be quite low.  He is afebrile however and with severe ongoing head and neck discomfort I would plan for noncontrast CT imaging.  Concern at this time with worsening headache and neck discomfort in the setting of recurrent vomiting would be for developing meningitis.  Less likely bacterial but possible.  I do believe he requires emergent LP.  Otherwise certainly viral syndrome could be contributing to this.  On further clinical examination do not appreciate other signs of bacterial infection such as otitis media, oropharyngeal lesion or dental abscess.  He has no signs of increased work of breathing.  Low suspicion for developing bacterial pneumonia.  Abdominal examination benign.  I did review laboratory studies from earlier in the day.  I would repeat CBC and lactic acid as well as obtain blood cultures.  He would benefit from initial broad-spectrum antibiotics and de-escalate as clinical condition allows.  This patient is ill and meets criteria for SIRS.    Patient initially received vancomycin and ceftriaxone with addition of acyclovir.  I did add dexamethasone with possible bacterial infection.  Initial CSF demonstrates concern for meningitis initially equivocal viral versus bacterial as he has moderate elevation in protein as well as nucleated cells.  We did receive a call from lab due to concern of rare gram-positive cocci.  Possible strep pneumo as cause.  He has received vancomycin.  This patient has high risk to remain quite ill or possibly deteriorate.  I believe he would benefit from at least intermediate level of care.  We do not have ICU beds at our facility and will continue to look regionally for optimal placement of this patient.  Presently he remains hemodynamically stable with blood pressure in the low 100s.  He is a borderline temperature.  On reevaluation he is  slight improvement overall with symptoms.  Patient signed over to my colleague at 2130 pending placement.          New Prescriptions    No medications on file       Final diagnoses:   Severe sepsis (H)   Hypokalemia   Bacterial meningitis       5/19/2022   [facility] EMERGENCY DEPT     Kenton, Maicol Vasquez MD  05/19/22 2123

## 2022-05-19 NOTE — ED PROVIDER NOTES
"     Emergency Department Patient Sign-out       Brief HPI:  This is a 32 year old male signed out to me by Dr. Tian Allen .  See initial ED Provider note for details of the presentation.     -Nausea vomiting.  No diarrhea.  Last BM described as constipation last evening  -Emesis is gastric contents followed by bilious emesis dry heaves  -Chills with low-grade fever(no fever in ED)  -Appears fluid volume down with tachycardia.  Sinus tach = 120 bpm  -No questionable food ingestion.  Hamburgers last evening.  The evening prior ate Ramen noodles.  No travel or sick contacts.  -No inflammatory bowel disease.    Repeat exam: Abdomen not soft but not rigid.  Pain is generalized but seems to localize more in the right lower quadrant with some guarding.  No rebound/peritoneal signs.  Bowel sounds remain present.  No hepatomegaly.  Spleen tip was not able.  No generalized adenopathy in the cervical, axillary or inguinal.            Exam:   Patient Vitals for the past 24 hrs:   BP Temp Temp src Pulse Resp SpO2 Height   05/19/22 0606 118/62 99.2  F (37.3  C) Oral (!) 121 18 95 % 1.676 m (5' 6\")           ED RESULTS:   Results for orders placed or performed during the hospital encounter of 05/19/22 (from the past 24 hour(s))   CBC with Platelets & Differential     Status: Abnormal    Collection Time: 05/19/22  6:30 AM    Narrative    The following orders were created for panel order CBC with Platelets & Differential.  Procedure                               Abnormality         Status                     ---------                               -----------         ------                     CBC with platelets and d...[[id number]]  Abnormal            Final result                 Please view results for these tests on the individual orders.   Lipase     Status: Normal    Collection Time: 05/19/22  6:30 AM   Result Value Ref Range    Lipase 81 73 - 393 U/L   Comprehensive metabolic panel     Status: Abnormal    Collection Time: " 05/19/22  6:30 AM   Result Value Ref Range    Sodium 141 133 - 144 mmol/L    Potassium 3.3 (L) 3.4 - 5.3 mmol/L    Chloride 108 94 - 109 mmol/L    Carbon Dioxide (CO2) 24 20 - 32 mmol/L    Anion Gap 9 3 - 14 mmol/L    Urea Nitrogen 19 7 - 30 mg/dL    Creatinine 0.90 0.66 - 1.25 mg/dL    Calcium 8.8 8.5 - 10.1 mg/dL    Glucose 185 (H) 70 - 99 mg/dL    Alkaline Phosphatase 84 40 - 150 U/L    AST 34 0 - 45 U/L    ALT 61 0 - 70 U/L    Protein Total 7.4 6.8 - 8.8 g/dL    Albumin 4.3 3.4 - 5.0 g/dL    Bilirubin Total 0.6 0.2 - 1.3 mg/dL    GFR Estimate >90 >60 mL/min/1.73m2   CBC with platelets and differential     Status: Abnormal    Collection Time: 05/19/22  6:30 AM   Result Value Ref Range    WBC Count 17.3 (H) 4.0 - 11.0 10e3/uL    RBC Count 4.68 4.40 - 5.90 10e6/uL    Hemoglobin 14.8 13.3 - 17.7 g/dL    Hematocrit 42.0 40.0 - 53.0 %    MCV 90 78 - 100 fL    MCH 31.6 26.5 - 33.0 pg    MCHC 35.2 31.5 - 36.5 g/dL    RDW 12.7 10.0 - 15.0 %    Platelet Count 229 150 - 450 10e3/uL    % Neutrophils 92 %    % Lymphocytes 3 %    % Monocytes 4 %    % Eosinophils 0 %    % Basophils 0 %    % Immature Granulocytes 1 %    NRBCs per 100 WBC 0 <1 /100    Absolute Neutrophils 15.9 (H) 1.6 - 8.3 10e3/uL    Absolute Lymphocytes 0.5 (L) 0.8 - 5.3 10e3/uL    Absolute Monocytes 0.8 0.0 - 1.3 10e3/uL    Absolute Eosinophils 0.0 0.0 - 0.7 10e3/uL    Absolute Basophils 0.0 0.0 - 0.2 10e3/uL    Absolute Immature Granulocytes 0.1 <=0.4 10e3/uL    Absolute NRBCs 0.0 10e3/uL   Symptomatic; Yes; 5/19/2022 Influenza A/B & SARS-CoV2 (COVID-19) Virus PCR Multiplex Nose     Status: Normal    Collection Time: 05/19/22  6:55 AM    Specimen: Nose; Swab   Result Value Ref Range    Influenza A PCR Negative Negative    Influenza B PCR Negative Negative    SARS CoV2 PCR Negative Negative    Narrative    Testing was performed using the carole SARS-CoV-2 & Influenza A/B Assay on the carole Lesvia System. This test should be ordered for the detection of  SARS-CoV-2 and influenza viruses in individuals who meet clinical and/or epidemiological criteria. Test performance is unknown in asymptomatic patients. This test is for in vitro diagnostic use under the FDA EUA for laboratories certified under CLIA to perform moderate and/or high complexity testing. This test has not been FDA cleared or approved. A negative result does not rule out the presence of PCR inhibitors in the specimen or target RNA in concentration below the limit of detection for the assay. If only one viral target is positive but coinfection with multiple targets is suspected, the sample should be re-tested with another FDA cleared, approved or authorized test, if coinfection would change clinical management. [facility] are certified under the Clinical Laboratory Improvement Amendments of 1988 (CLIA-88) as  qualified to perform moderate and/or high complexity laboratory testing.   Lactic acid whole blood     Status: Normal    Collection Time: 05/19/22  7:32 AM   Result Value Ref Range    Lactic Acid 1.3 0.7 - 2.0 mmol/L       ED MEDICATIONS:   Medications   0.9% sodium chloride BOLUS (0 mLs Intravenous Stopped 5/19/22 0730)     Followed by   sodium chloride 0.9% infusion (0 mLs Intravenous Hold 5/19/22 0811)   0.9% sodium chloride BOLUS (has no administration in time range)     Followed by   sodium chloride 0.9% infusion (has no administration in time range)   HYDROmorphone (PF) (DILAUDID) injection 0.5 mg (has no administration in time range)   0.9% sodium chloride BOLUS (0 mLs Intravenous Stopped 5/19/22 0645)   ondansetron (ZOFRAN) injection 4 mg (4 mg Intravenous Given 5/19/22 0640)   ketorolac (TORADOL) injection 30 mg (30 mg Intravenous Given 5/19/22 0647)         Impression:    ICD-10-CM    1. Non-intractable vomiting with nausea, unspecified vomiting type  R11.2    2. Nausea and vomiting, intractability of vomiting not specified, unspecified vomiting type  R11.2    3.  Dehydration  E86.0    4. Lymphadenopathy  R59.1        Plan:    8:15 AM  Reassessment.  Still complaining of pain 7/10.  Appears fluid volume down and still no urge to urinate.  Has received 1 L normal saline.  Administration of 500 cc saline and sed rate for maintenance 25 cc an hour.  He does have some localizing pain in the right lower quadrant.  Overall his pain is generalized.  Discomfort right lower quadrant was associate some guarding.  With a white count of 17,000 plan to obtain a CT scan to screen for acute appendicitis.  1:32 PM  CT did not show any acute process such as appendicitis.  It did show significant enough concern regarding presence of adenopathy that radiology recommended doing a chest CT.  Chest imaging also confirm that he has some hilar adenopathy.  This all could be reactive from acute infectious/viral process but we need to make sure that is not tied any form of malignancy.  He has had some night sweats chills but no unexplained weight loss.  Patient was set up for an appointment with a new primary care provider on Monday.  Was sent home on Zofran.  Encouraged to hydrate the water and Gatorade.  Return if he has intractable vomiting.  Radiology felt that repeat imaging of the CT or CT PET scan in a few weeks was appropriate.      Miguel Payne, DO          Miguel Payne DO  05/19/22 3569

## 2022-05-19 NOTE — LETTER
May 19, 2022      To Whom It May Concern:      David Huang was seen in our Emergency Department today, 05/19/22.   At this time he is medically advised not to return to work until given medical clearance.  He does have an appointment with a clinic provider on Monday at 11 AM.      Sincerely,        Miguel Payne Dr. [facility] ED Staff Physician

## 2022-05-19 NOTE — ED TRIAGE NOTES
Started to feel ill around 0100.  Had fever, chills, nausea, vomiting and back pain. Tried acetaminophen and naproxen.     Triage Assessment     Row Name 05/19/22 0618       Triage Assessment (Adult)    Airway WDL WDL       Respiratory WDL    Respiratory WDL WDL       Skin Circulation/Temperature WDL    Skin Circulation/Temperature WDL WDL       Cardiac WDL    Cardiac WDL WDL       Peripheral/Neurovascular WDL    Peripheral Neurovascular WDL WDL       Cognitive/Neuro/Behavioral WDL    Cognitive/Neuro/Behavioral WDL WDL       Pj Coma Scale    Best Eye Response 4-->(E4) spontaneous    Best Motor Response 6-->(M6) obeys commands    Best Verbal Response 5-->(V5) oriented    Pj Coma Scale Score 15

## 2022-05-20 PROBLEM — G00.9 BACTERIAL MENINGITIS: Status: ACTIVE | Noted: 2022-05-20

## 2022-05-20 PROBLEM — E87.6 HYPOKALEMIA: Status: ACTIVE | Noted: 2022-05-20

## 2022-05-20 PROBLEM — A41.9 SEVERE SEPSIS (H): Status: ACTIVE | Noted: 2022-05-20

## 2022-05-20 PROBLEM — R65.20 SEVERE SEPSIS (H): Status: ACTIVE | Noted: 2022-05-20

## 2022-05-20 LAB
C GATTII+NEOFOR DNA CSF QL NAA+NON-PROBE: NEGATIVE
CMV DNA CSF QL NAA+NON-PROBE: NEGATIVE
E COLI K1 AG CSF QL: NEGATIVE
EV RNA SPEC QL NAA+PROBE: NEGATIVE
GP B STREP DNA CSF QL NAA+NON-PROBE: NEGATIVE
HAEM INFLU DNA CSF QL NAA+NON-PROBE: NEGATIVE
HHV6 DNA CSF QL NAA+NON-PROBE: NEGATIVE
HSV1 DNA CSF QL NAA+NON-PROBE: NEGATIVE
HSV2 DNA CSF QL NAA+NON-PROBE: NEGATIVE
L MONOCYTOG DNA CSF QL NAA+NON-PROBE: NEGATIVE
LACTATE SERPL-SCNC: 1.5 MMOL/L (ref 0.7–2)
LACTATE SERPL-SCNC: 2.3 MMOL/L (ref 0.7–2)
MAGNESIUM SERPL-MCNC: 1.9 MG/DL (ref 1.6–2.3)
N MEN DNA CSF QL NAA+NON-PROBE: NEGATIVE
PARECHOVIRUS A RNA CSF QL NAA+NON-PROBE: NEGATIVE
POTASSIUM BLD-SCNC: 3.7 MMOL/L (ref 3.4–5.3)
POTASSIUM BLD-SCNC: 3.8 MMOL/L (ref 3.4–5.3)
S PNEUM DNA CSF QL NAA+NON-PROBE: NEGATIVE
VANCOMYCIN SERPL-MCNC: 17.1 MG/L
VZV DNA CSF QL NAA+NON-PROBE: NEGATIVE

## 2022-05-20 PROCEDURE — 84132 ASSAY OF SERUM POTASSIUM: CPT | Performed by: FAMILY MEDICINE

## 2022-05-20 PROCEDURE — 258N000003 HC RX IP 258 OP 636: Performed by: NURSE PRACTITIONER

## 2022-05-20 PROCEDURE — 120N000001 HC R&B MED SURG/OB

## 2022-05-20 PROCEDURE — G0378 HOSPITAL OBSERVATION PER HR: HCPCS

## 2022-05-20 PROCEDURE — 96367 TX/PROPH/DG ADDL SEQ IV INF: CPT | Performed by: EMERGENCY MEDICINE

## 2022-05-20 PROCEDURE — 83605 ASSAY OF LACTIC ACID: CPT | Performed by: NURSE PRACTITIONER

## 2022-05-20 PROCEDURE — 84132 ASSAY OF SERUM POTASSIUM: CPT | Performed by: NURSE PRACTITIONER

## 2022-05-20 PROCEDURE — 36415 COLL VENOUS BLD VENIPUNCTURE: CPT | Performed by: PEDIATRICS

## 2022-05-20 PROCEDURE — 83605 ASSAY OF LACTIC ACID: CPT | Performed by: PEDIATRICS

## 2022-05-20 PROCEDURE — 258N000003 HC RX IP 258 OP 636: Performed by: PEDIATRICS

## 2022-05-20 PROCEDURE — 80202 ASSAY OF VANCOMYCIN: CPT | Performed by: NURSE PRACTITIONER

## 2022-05-20 PROCEDURE — 36415 COLL VENOUS BLD VENIPUNCTURE: CPT | Performed by: FAMILY MEDICINE

## 2022-05-20 PROCEDURE — 250N000011 HC RX IP 250 OP 636: Performed by: EMERGENCY MEDICINE

## 2022-05-20 PROCEDURE — 250N000011 HC RX IP 250 OP 636: Performed by: NURSE PRACTITIONER

## 2022-05-20 PROCEDURE — 258N000003 HC RX IP 258 OP 636: Performed by: EMERGENCY MEDICINE

## 2022-05-20 PROCEDURE — 36415 COLL VENOUS BLD VENIPUNCTURE: CPT | Performed by: NURSE PRACTITIONER

## 2022-05-20 PROCEDURE — 83735 ASSAY OF MAGNESIUM: CPT | Performed by: NURSE PRACTITIONER

## 2022-05-20 PROCEDURE — 96366 THER/PROPH/DIAG IV INF ADDON: CPT | Performed by: EMERGENCY MEDICINE

## 2022-05-20 PROCEDURE — 99223 1ST HOSP IP/OBS HIGH 75: CPT | Mod: AI | Performed by: NURSE PRACTITIONER

## 2022-05-20 PROCEDURE — 250N000011 HC RX IP 250 OP 636: Performed by: FAMILY MEDICINE

## 2022-05-20 PROCEDURE — 96361 HYDRATE IV INFUSION ADD-ON: CPT | Performed by: EMERGENCY MEDICINE

## 2022-05-20 RX ORDER — ONDANSETRON 4 MG/1
4 TABLET, ORALLY DISINTEGRATING ORAL EVERY 6 HOURS PRN
Status: DISCONTINUED | OUTPATIENT
Start: 2022-05-20 | End: 2022-05-23 | Stop reason: HOSPADM

## 2022-05-20 RX ORDER — AMOXICILLIN 250 MG
2 CAPSULE ORAL 2 TIMES DAILY PRN
Status: DISCONTINUED | OUTPATIENT
Start: 2022-05-20 | End: 2022-05-23 | Stop reason: HOSPADM

## 2022-05-20 RX ORDER — PROCHLORPERAZINE MALEATE 5 MG
10 TABLET ORAL EVERY 6 HOURS PRN
Status: DISCONTINUED | OUTPATIENT
Start: 2022-05-20 | End: 2022-05-23 | Stop reason: HOSPADM

## 2022-05-20 RX ORDER — NALOXONE HYDROCHLORIDE 0.4 MG/ML
0.4 INJECTION, SOLUTION INTRAMUSCULAR; INTRAVENOUS; SUBCUTANEOUS
Status: DISCONTINUED | OUTPATIENT
Start: 2022-05-20 | End: 2022-05-23 | Stop reason: HOSPADM

## 2022-05-20 RX ORDER — PROCHLORPERAZINE 25 MG
25 SUPPOSITORY, RECTAL RECTAL EVERY 12 HOURS PRN
Status: DISCONTINUED | OUTPATIENT
Start: 2022-05-20 | End: 2022-05-23 | Stop reason: HOSPADM

## 2022-05-20 RX ORDER — ACETAMINOPHEN 325 MG/1
650 TABLET ORAL EVERY 6 HOURS PRN
Status: DISCONTINUED | OUTPATIENT
Start: 2022-05-20 | End: 2022-05-23 | Stop reason: HOSPADM

## 2022-05-20 RX ORDER — AMOXICILLIN 250 MG
1 CAPSULE ORAL 2 TIMES DAILY PRN
Status: DISCONTINUED | OUTPATIENT
Start: 2022-05-20 | End: 2022-05-23 | Stop reason: HOSPADM

## 2022-05-20 RX ORDER — NALOXONE HYDROCHLORIDE 0.4 MG/ML
0.2 INJECTION, SOLUTION INTRAMUSCULAR; INTRAVENOUS; SUBCUTANEOUS
Status: DISCONTINUED | OUTPATIENT
Start: 2022-05-20 | End: 2022-05-23 | Stop reason: HOSPADM

## 2022-05-20 RX ORDER — CEFTRIAXONE 2 G/1
2 INJECTION, POWDER, FOR SOLUTION INTRAMUSCULAR; INTRAVENOUS ONCE
Status: COMPLETED | OUTPATIENT
Start: 2022-05-20 | End: 2022-05-20

## 2022-05-20 RX ORDER — LIDOCAINE 40 MG/G
CREAM TOPICAL
Status: DISCONTINUED | OUTPATIENT
Start: 2022-05-20 | End: 2022-05-23 | Stop reason: HOSPADM

## 2022-05-20 RX ORDER — ACETAMINOPHEN 650 MG/1
650 SUPPOSITORY RECTAL EVERY 6 HOURS PRN
Status: DISCONTINUED | OUTPATIENT
Start: 2022-05-20 | End: 2022-05-23 | Stop reason: HOSPADM

## 2022-05-20 RX ORDER — CEFTRIAXONE 2 G/1
2 INJECTION, POWDER, FOR SOLUTION INTRAMUSCULAR; INTRAVENOUS EVERY 12 HOURS
Status: DISCONTINUED | OUTPATIENT
Start: 2022-05-21 | End: 2022-05-23 | Stop reason: HOSPADM

## 2022-05-20 RX ORDER — ONDANSETRON 2 MG/ML
4 INJECTION INTRAMUSCULAR; INTRAVENOUS EVERY 6 HOURS PRN
Status: DISCONTINUED | OUTPATIENT
Start: 2022-05-20 | End: 2022-05-23 | Stop reason: HOSPADM

## 2022-05-20 RX ADMIN — VANCOMYCIN HYDROCHLORIDE 1500 MG: 1 INJECTION, POWDER, LYOPHILIZED, FOR SOLUTION INTRAVENOUS at 20:14

## 2022-05-20 RX ADMIN — VANCOMYCIN HYDROCHLORIDE 1500 MG: 1 INJECTION, POWDER, LYOPHILIZED, FOR SOLUTION INTRAVENOUS at 02:47

## 2022-05-20 RX ADMIN — SODIUM CHLORIDE 150 ML/HR: 9 INJECTION, SOLUTION INTRAVENOUS at 20:16

## 2022-05-20 RX ADMIN — VANCOMYCIN HYDROCHLORIDE 1500 MG: 1 INJECTION, POWDER, LYOPHILIZED, FOR SOLUTION INTRAVENOUS at 11:22

## 2022-05-20 RX ADMIN — SODIUM CHLORIDE, POTASSIUM CHLORIDE, SODIUM LACTATE AND CALCIUM CHLORIDE 1000 ML: 600; 310; 30; 20 INJECTION, SOLUTION INTRAVENOUS at 19:08

## 2022-05-20 RX ADMIN — CEFTRIAXONE SODIUM 2 G: 2 INJECTION, POWDER, FOR SOLUTION INTRAMUSCULAR; INTRAVENOUS at 14:07

## 2022-05-20 RX ADMIN — SODIUM CHLORIDE 150 ML/HR: 9 INJECTION, SOLUTION INTRAVENOUS at 10:00

## 2022-05-20 ASSESSMENT — ACTIVITIES OF DAILY LIVING (ADL): ADLS_ACUITY_SCORE: 35

## 2022-05-20 NOTE — ED NOTES
line used to greet pt, introduced my self. Explained the plan for admission here today after discharge. David denied pain and reports understanding.

## 2022-05-20 NOTE — H&P
[facility]    History and Physical - Hospitalist Service       Date of Admission:  5/19/2022    Assessment & Plan      David Huang is a 32 year old male admitted on 5/19/2022. He initially presented to the emergency room on the morning of 5/19 for increased abdominal pain that started around 1 AM.  He was sent home with antiemetics and appointment to follow-up with PCP.  Patient return to the emergency room at approximately 5:30 PM with continued nausea vomiting and subjective fevers.  ED evaluation was significant for elevated WBC, CRP and LP with WBC, neutrophils, hazy clarity resulting in a suspicion for bacterial meningitis.  He is being admitted for treatment and continued monitoring of bacterial meningitis.    Active Problems:    Bacterial meningitis  Assessment: Positive for headache with neck pain, nausea vomiting    Monitor cultures from LP for bacterial meningitis    Rocephin 2 g every 12    Vancomycin pharmacy to dose    Given 10 mg dexamethasone in ED    CRP 69, repeat in a.m.    CBC, BMP in a.m.      Severe sepsis (H)  Assessment: Patient was tachycardic with elevated lactate upon arrival to the emergency room    Lactic acid 1.3, now resolving    Tachycardia resolved    Temperature peaked at 99.3, now resolved, will continue to monitor        Hypokalemia  Assessment: Suspect loss via emesis and dehydration    3.3 on arrival, after supplementation level has normalized    Normal saline at 100 mL/HL    Replacement protocol     Diet: Combination Diet Clear Liquid, Full Liquid, Regular Diet    DVT Prophylaxis: Low Risk/Ambulatory with no VTE prophylaxis indicated  Reinoso Catheter: Not present  Central Lines: None  Cardiac Monitoring: None  Code Status:  Full    Clinically Significant Risk Factors Present on Admission                  # Overweight: Estimated body mass index is 25.34 kg/m  as calculated from the following:    Height as of this encounter: 1.676 m (5'  "6\").    Weight as of this encounter: 71.2 kg (157 lb).      Disposition Plan   Expected Discharge:  5/21/2022  Anticipated discharge location:  Awaiting care coordination huddle  Delays:    Culture results       The patient's care was discussed with the Attending Physician, Dr. Bryan, Bedside Nurse, Care Coordinator/ and Patient.    Ruiz Brar NP  Hospitalist Service  [facility]  Securely message with the Vocera Web Console (learn more here)  Text page via [facility] Paging/Directory         ______________________________________________________________________    Chief Complaint   Headache with neck pain and abdominal pain    History is obtained from the patient    History of Present Illness   David Huang is a 32 year old male who was seen in the emergency room on 5/19 for nausea, vomiting and abdominal pain.  Patient was initially seen in the morning and again seen in the evening due to increased and worsening symptoms.  Patient returned with 9/10 headache and 9/10 neck stiffness, continued nausea and vomiting with fatigue and chills.  LP was done in the emergency room which was suspected for bacterial meningitis.  He was admitted to the hospital for continued antibiotic treatment and monitoring.    Review of Systems    The 10 point Review of Systems is negative other than noted in the HPI or here.  Via     Past Medical History    I have reviewed this patient's medical history and updated it with pertinent information if needed.   Past Medical History:   Diagnosis Date     Heart murmur        Past Surgical History   I have reviewed this patient's surgical history and updated it with pertinent information if needed.  No past surgical history on file.    Social History   I have reviewed this patient's social history and updated it with pertinent information if needed.       Family History     No significant family history    Prior to Admission " 85 yo M with history of DM, HTN, CAD s/p CABG/AICD, HFpEF, BPH w/ chronic us orthostatic hypotension, presents for generalized weakness and not feeling well for the past few days. Per HHA , pt was very weak, pale & diaphoretic on her arrival this am. He was also a little confused and did not want to get out of bed. Had one episode of loose watery brown stool. Patient has been complaining of suprapubic pain for the past week and was given cipro as prescribed by his Nephrologist Dr. Lynn for uti. Denies f/c, cp/sob, cough, nv, melena, brbpr, flank pain, rash.   To note that the patient is a poor historian and does not remember all the information. Some of the history was obtained from the ED chart and from his relative Benny Flower (HCP).  At baseline he can walk independently and sometimes uses a walker.  In the Ed he was found to have WBC of 99476 and was found to have prostate abscess on CT scan. Medications   Prior to Admission Medications   Prescriptions Last Dose Informant Patient Reported? Taking?   Acetaminophen (TYLENOL PO) 5/18/2022 at hs Friend Yes Yes   NAPROXEN PO 5/18/2022 at hs Friend Yes Yes   ondansetron (ZOFRAN ODT) 4 MG ODT tab 5/19/2022 at 1330 Friend No Yes   Sig: Take 1 tablet (4 mg) by mouth every 6 hours as needed for nausea      Facility-Administered Medications: None     Allergies   No Known Allergies    Physical Exam   Vital Signs: Temp: 98.6  F (37  C) Temp src: Oral BP: 106/87 Pulse: 85   Resp: 15 SpO2: 99 % O2 Device: None (Room air)    Weight: 157 lbs 0 oz    Constitutional: awake, alert, cooperative, no apparent distress, and appears stated age  Respiratory: No increased work of breathing, good air exchange, clear to auscultation bilaterally, no crackles or wheezing  Cardiovascular: normal apical pulses  and normal S1 and S2  GI: normal bowel sounds, non-distended and non-tender  Musculoskeletal: no lower extremity pitting edema present  full range of motion noted  motor strength is 5 out of 5 all extremities bilaterally  tone is normal  Neurologic: Mental Status Exam:  Level of Alertness:   awake  Orientation:   person, place, time  Memory:   normal  Attention/Concentration:  normal  Cranial Nerves:  cranial nerves II-XII are grossly intact  Motor Exam:  moves all extremities well and symmetrically  Sensory:  Sensory intact    Data   Data reviewed today: I reviewed all medications, new labs and imaging results over the last 24 hours. I personally reviewed the chest CT image(s) showing Lower thoracic and upper abdominal lymphadenopathy.  Differentials include lymphoproliferative disease, metastasis or reactive lymph nodes.  Recommend follow-up CT or PET in a few weeks.  Indeterminant pulmonary nodes which measure up to 4 mm.    Recent Labs   Lab 05/20/22  1641 05/20/22  0650 05/19/22  1900 05/19/22  0630   WBC  --   --  15.7* 17.3*   HGB  --   --  14.4 14.8   MCV  --   --  91 90    PLT  --   --  249 229   NA  --   --  140 141   POTASSIUM 3.8 3.7 3.3* 3.3*   CHLORIDE  --   --  108 108   CO2  --   --  25 24   BUN  --   --  13 19   CR  --   --  0.88 0.90   ANIONGAP  --   --  7 9   LAKHWINDER  --   --  8.5 8.8   GLC  --   --  139* 185*   ALBUMIN  --   --   --  4.3   PROTTOTAL  --   --   --  7.4   BILITOTAL  --   --   --  0.6   ALKPHOS  --   --   --  84   ALT  --   --   --  61   AST  --   --   --  34   LIPASE  --   --   --  81     Recent Results (from the past 24 hour(s))   CT Head w/o Contrast    Narrative    EXAM: CT HEAD W/O CONTRAST  LOCATION: [facility]  DATE/TIME: 5/19/2022 6:41 PM    INDICATION: Severe headache  COMPARISON: None.  TECHNIQUE: Routine CT Head without IV contrast. Multiplanar reformats. Dose reduction techniques were used.    FINDINGS:  INTRACRANIAL CONTENTS: No intracranial hemorrhage, extraaxial collection, or mass effect.  No CT evidence of acute infarct. Normal parenchymal attenuation. Normal ventricles and sulci.     VISUALIZED ORBITS/SINUSES/MASTOIDS: No intraorbital abnormality. No paranasal sinus mucosal disease. No middle ear or mastoid effusion.    BONES/SOFT TISSUES: No acute abnormality.      Impression    IMPRESSION:  1.  No acute intracranial process.     The use of Dragon/Bit9 dictation services may have been used to construct the content in this note; any grammatical or spelling errors are non-intentional. Please contact the author of this note directly if you are in need of any clarification.

## 2022-05-20 NOTE — PHARMACY-VANCOMYCIN DOSING SERVICE
Pharmacy Vancomycin Initial Note  Date of Service May 19, 2022  Patient's  1990  32 year old, male    Indication: Meningitis    Current estimated CrCl = Estimated Creatinine Clearance: 123.6 mL/min (based on SCr of 0.88 mg/dL).    Creatinine for last 3 days  2022:  6:30 AM Creatinine 0.90 mg/dL;  7:00 PM Creatinine 0.88 mg/dL    Recent Vancomycin Level(s) for last 3 days  No results found for requested labs within last 72 hours.      Vancomycin IV Administrations (past 72 hours)                   vancomycin (VANCOCIN) 1,500 mg in sodium chloride 0.9 % 250 mL intermittent infusion (mg) 1,500 mg New Bag 22 190                Nephrotoxins and other renal medications (From now, onward)    Start     Dose/Rate Route Frequency Ordered Stop    22 191  acyclovir (ZOVIRAX) 700 mg in sodium chloride 0.9 % 250 mL intermittent infusion         10 mg/kg × 71.2 kg  250 mL/hr over 1 Hours Intravenous EVERY 8 HOURS 22 1846      22 1835  vancomycin (VANCOCIN) 1,500 mg in sodium chloride 0.9 % 250 mL intermittent infusion         20 mg/kg × 71.2 kg  over 90 Minutes Intravenous EVERY 8 HOURS 22 1831            Contrast Orders - past 72 hours (72h ago, onward)    None                Plan:  1. Start vancomycin  1500 mg IV q8h.   2. Vancomycin monitoring method: Trough (Method 2 = manual dose calculation)  3. Vancomycin therapeutic monitoring goal: 15-20 mg/L  4. Pharmacy will check vancomycin levels as appropriate in 1-3 Days.    5. Serum creatinine levels will be ordered daily for the first week of therapy and at least twice weekly for subsequent weeks.      Yasmeen Gonzáles, [facility]

## 2022-05-20 NOTE — ED PROVIDER NOTES
I assumed patient's care at change of shift.  Has been sick for less than 24 hours and rapidly deteriorated this afternoon.  White count is elevated 15.7, CRP up at 69.  LP was performed and was hazy in color and showed 923 white cells with 89% neutrophils concerning for bacterial meningitis.  He has already been started on IV ceftriaxone, vancomycin, acyclovir and Decadron.  We have no beds available at our facility and are looking for a bed in the area.        Results for orders placed or performed during the hospital encounter of 05/19/22 (from the past 24 hour(s))   CT Head w/o Contrast    Narrative    EXAM: CT HEAD W/O CONTRAST  LOCATION: [facility]  DATE/TIME: 5/19/2022 6:41 PM    INDICATION: Severe headache  COMPARISON: None.  TECHNIQUE: Routine CT Head without IV contrast. Multiplanar reformats. Dose reduction techniques were used.    FINDINGS:  INTRACRANIAL CONTENTS: No intracranial hemorrhage, extraaxial collection, or mass effect.  No CT evidence of acute infarct. Normal parenchymal attenuation. Normal ventricles and sulci.     VISUALIZED ORBITS/SINUSES/MASTOIDS: No intraorbital abnormality. No paranasal sinus mucosal disease. No middle ear or mastoid effusion.    BONES/SOFT TISSUES: No acute abnormality.      Impression    IMPRESSION:  1.  No acute intracranial process.   Lactic acid whole blood   Result Value Ref Range    Lactic Acid 1.1 0.7 - 2.0 mmol/L   CBC with platelets differential    Narrative    The following orders were created for panel order CBC with platelets differential.  Procedure                               Abnormality         Status                     ---------                               -----------         ------                     CBC with platelets and d...[[id number]]  Abnormal            Final result                 Please view results for these tests on the individual orders.   Basic metabolic panel   Result Value Ref Range    Sodium 140 133 -  144 mmol/L    Potassium 3.3 (L) 3.4 - 5.3 mmol/L    Chloride 108 94 - 109 mmol/L    Carbon Dioxide (CO2) 25 20 - 32 mmol/L    Anion Gap 7 3 - 14 mmol/L    Urea Nitrogen 13 7 - 30 mg/dL    Creatinine 0.88 0.66 - 1.25 mg/dL    Calcium 8.5 8.5 - 10.1 mg/dL    Glucose 139 (H) 70 - 99 mg/dL    GFR Estimate >90 >60 mL/min/1.73m2   Magnesium   Result Value Ref Range    Magnesium 2.0 1.6 - 2.3 mg/dL   CRP inflammation   Result Value Ref Range    CRP Inflammation 69.0 (H) 0.0 - 8.0 mg/L   CBC with platelets and differential   Result Value Ref Range    WBC Count 15.7 (H) 4.0 - 11.0 10e3/uL    RBC Count 4.54 4.40 - 5.90 10e6/uL    Hemoglobin 14.4 13.3 - 17.7 g/dL    Hematocrit 41.1 40.0 - 53.0 %    MCV 91 78 - 100 fL    MCH 31.7 26.5 - 33.0 pg    MCHC 35.0 31.5 - 36.5 g/dL    RDW 12.9 10.0 - 15.0 %    Platelet Count 249 150 - 450 10e3/uL    % Neutrophils 92 %    % Lymphocytes 5 %    % Monocytes 3 %    % Eosinophils 0 %    % Basophils 0 %    % Immature Granulocytes 0 %    NRBCs per 100 WBC 0 <1 /100    Absolute Neutrophils 14.4 (H) 1.6 - 8.3 10e3/uL    Absolute Lymphocytes 0.7 (L) 0.8 - 5.3 10e3/uL    Absolute Monocytes 0.5 0.0 - 1.3 10e3/uL    Absolute Eosinophils 0.0 0.0 - 0.7 10e3/uL    Absolute Basophils 0.0 0.0 - 0.2 10e3/uL    Absolute Immature Granulocytes 0.1 <=0.4 10e3/uL    Absolute NRBCs 0.0 10e3/uL   Glucose CSF:   Result Value Ref Range    Glucose CSF 68 40 - 70 mg/dL    Narrative    CSF glucose concentrations are about 60 percent of normal plasma glucose.  CSF glucose concentrations are about 60 percent of normal plasma glucose.   Protein total CSF:   Result Value Ref Range    Protein total  (H) 15 - 60 mg/dL    Narrative    This is a lab developed test. It has not been cleared or approved by the FDA.   Cerebrospinal fluid Aerobic Bacterial Culture Routine with Gram Stain    Specimen: Lumbar Puncture; Cerebrospinal fluid   Result Value Ref Range    Gram Stain Result No organisms seen     Gram Stain Result  4+ WBC seen     Narrative    Gram Stain quantification of host cells and microbiological organisms was done on a cytocentrifuged preparation.     CSF Cell Count with Differential:    Narrative    The following orders were created for panel order CSF Cell Count with Differential:.  Procedure                               Abnormality         Status                     ---------                               -----------         ------                     Cell Count CSF[[id number]]               Abnormal            Final result               Differential CSF[[id number]]                                 Final result                 Please view results for these tests on the individual orders.   Meningitis/Encephalitis Panel Qual PCR CSF:   Result Value Ref Range    Escherichia coli K1 Negative Negative    Haemophilus influenzae Negative Negative    Listeria monocytogenes Negative Negative    Neisseria meningitidis Negative Negative    Streptococcus agalactiae (GBS) Negative Negative    Streptococcus pneumoniae Negative Negative    Cytomegalovirus Negative Negative    Enterovirus Negative Negative    Herpes simplex virus 1 Negative Negative    Herpes simplex virus 2 Negative Negative    Human Herpes Virus 6 Negative Negative    Human parechovirus Negative Negative    Varicella zoster virus Negative Negative    Cryptococcus neoformans/gattii Negative Negative    Narrative    Assay performed using the FDA-cleared FilmArray ME Panel from [employer]    This test has been verified and is performed by the Infectious Diseases Diagnostic Laboratory at [facility]. This laboratory is certified under the Clinical Laboratory Improvement Amendments of 1988 (CLIA-88) as qualified to perform high complexity clinical laboratory testing.  A negative result does not rule out the presence of PCR inhibitors in the specimen or target nucleic acids are in concentration below the limit of detection of the assay.   A negative  result should not rule out central nervous system infection with a high probability for meningitis or encephalitis. The assay does not test for all potential infectious agents.  Results are intended to aid in the diagnosis of illness and are meant to be used in conjunction with other clinical findings.   Cell Count CSF   Result Value Ref Range    Tube Number 1     Color Colorless Colorless    Clarity Hazy (A) Clear    Total Nucleated Cells 923 (H) 0 - 5 /uL    RBC Count 29 (H) 0 - 2 /uL   Differential CSF   Result Value Ref Range    % Neutrophils 89 %    % Lymphocytes 4 %    % Monocytes/Macrophages 7 %    Narrative    No reference ranges have been established. This result should be interpreted in the context of the patient's clinical condition and compared to simultaneous measurement in the patient's blood.   UA with Microscopic reflex to Culture    Specimen: Urine, Clean Catch   Result Value Ref Range    Color Urine Straw Colorless, Straw, Light Yellow, Yellow    Appearance Urine Clear Clear    Glucose Urine Negative Negative mg/dL    Bilirubin Urine Negative Negative    Ketones Urine 5  (A) Negative mg/dL    Specific Gravity Urine 1.008 1.003 - 1.035    Blood Urine Negative Negative    pH Urine 6.0 5.0 - 7.0    Protein Albumin Urine Negative Negative mg/dL    Urobilinogen Urine Normal Normal, 2.0 mg/dL    Nitrite Urine Negative Negative    Leukocyte Esterase Urine Negative Negative    RBC Urine <1 <=2 /HPF    WBC Urine <1 <=5 /HPF    Narrative    Urine Culture not indicated   -Lumbar Puncture    Narrative    Maicol Baer MD     5/19/2022  9:23 PM  [facility]    -Lumbar Puncture    Date/Time: 5/19/2022 7:58 PM  Performed by: Maicol Baer MD  Authorized by: Maicol Baer MD     Risks, benefits and alternatives discussed.      PRE-PROCEDURE DETAILS:     Procedure purpose:  Diagnostic    ANESTHESIA (see MAR for exact dosages):     Anesthesia method:  Local  infiltration    Local anesthetic:  Lidocaine 1% w/o epi      PROCEDURE DETAILS:     Lumbar space:  L4-L5 interspace    Needle gauge:  22    Needle type:  Spinal needle - Quincke tip    Needle length (in):  3.5    Ultrasound guidance: no      Number of attempts:  1    Tubes of fluid:  4    Total volume (ml):  7    POST-PROCEDURE:     Puncture site:  Adhesive bandage applied        PROCEDURE    Patient Tolerance:  Patient tolerated the procedure well with no immediate   complications     11:10 PM: I went and checked on the patient and spoke with a friend who speaks good English and was in the room with him.  He had asked for an update and pt consented.  David states he is feeling much better.  We will plan on boarding him in the ED as there are no beds available here or in the entire [facility] area.    Viral panel came back entirely negative so the acyclovir was discontinued.    Dr. Lemus will assume his care at change of shift.  Still waiting for a bed.  Vital signs remain stable         Clayton Gallo MD  05/20/22 0667

## 2022-05-20 NOTE — ED NOTES
This nurse signing over care. Report given to Pearl ECHEVARRIA for continuation of care. Plan of care for today is admission to [facility] with continued IV antibiotics and monitoring.

## 2022-05-20 NOTE — ED PROVIDER NOTES
Transfer of Care Note  This patient was signed out to me and I assumed care from Dr. Guzman at change of shift.  David Huang is a 32 year old male who presented to the emergency department with a chief complaint of Headache  .  Please see the original providers history and physical for complete details.    The following issues were signed out to me to follow up on:  disposition      Pertant Lab/Imaging Findings During this Visit was     Results for orders placed or performed during the hospital encounter of 05/19/22 (from the past 24 hour(s))   CT Head w/o Contrast    Narrative    EXAM: CT HEAD W/O CONTRAST  LOCATION: [facility]  DATE/TIME: 5/19/2022 6:41 PM    INDICATION: Severe headache  COMPARISON: None.  TECHNIQUE: Routine CT Head without IV contrast. Multiplanar reformats. Dose reduction techniques were used.    FINDINGS:  INTRACRANIAL CONTENTS: No intracranial hemorrhage, extraaxial collection, or mass effect.  No CT evidence of acute infarct. Normal parenchymal attenuation. Normal ventricles and sulci.     VISUALIZED ORBITS/SINUSES/MASTOIDS: No intraorbital abnormality. No paranasal sinus mucosal disease. No middle ear or mastoid effusion.    BONES/SOFT TISSUES: No acute abnormality.      Impression    IMPRESSION:  1.  No acute intracranial process.   Blood Culture Arm, Left    Specimen: Arm, Left; Blood   Result Value Ref Range    Culture No growth after 12 hours    Blood Culture Arm, Right    Specimen: Arm, Right; Blood   Result Value Ref Range    Culture No growth after 12 hours    Lactic acid whole blood   Result Value Ref Range    Lactic Acid 1.1 0.7 - 2.0 mmol/L   CBC with platelets differential    Narrative    The following orders were created for panel order CBC with platelets differential.  Procedure                               Abnormality         Status                     ---------                               -----------         ------                      CBC with platelets and d...[[id number]]  Abnormal            Final result                 Please view results for these tests on the individual orders.   Basic metabolic panel   Result Value Ref Range    Sodium 140 133 - 144 mmol/L    Potassium 3.3 (L) 3.4 - 5.3 mmol/L    Chloride 108 94 - 109 mmol/L    Carbon Dioxide (CO2) 25 20 - 32 mmol/L    Anion Gap 7 3 - 14 mmol/L    Urea Nitrogen 13 7 - 30 mg/dL    Creatinine 0.88 0.66 - 1.25 mg/dL    Calcium 8.5 8.5 - 10.1 mg/dL    Glucose 139 (H) 70 - 99 mg/dL    GFR Estimate >90 >60 mL/min/1.73m2   Magnesium   Result Value Ref Range    Magnesium 2.0 1.6 - 2.3 mg/dL   CRP inflammation   Result Value Ref Range    CRP Inflammation 69.0 (H) 0.0 - 8.0 mg/L   CBC with platelets and differential   Result Value Ref Range    WBC Count 15.7 (H) 4.0 - 11.0 10e3/uL    RBC Count 4.54 4.40 - 5.90 10e6/uL    Hemoglobin 14.4 13.3 - 17.7 g/dL    Hematocrit 41.1 40.0 - 53.0 %    MCV 91 78 - 100 fL    MCH 31.7 26.5 - 33.0 pg    MCHC 35.0 31.5 - 36.5 g/dL    RDW 12.9 10.0 - 15.0 %    Platelet Count 249 150 - 450 10e3/uL    % Neutrophils 92 %    % Lymphocytes 5 %    % Monocytes 3 %    % Eosinophils 0 %    % Basophils 0 %    % Immature Granulocytes 0 %    NRBCs per 100 WBC 0 <1 /100    Absolute Neutrophils 14.4 (H) 1.6 - 8.3 10e3/uL    Absolute Lymphocytes 0.7 (L) 0.8 - 5.3 10e3/uL    Absolute Monocytes 0.5 0.0 - 1.3 10e3/uL    Absolute Eosinophils 0.0 0.0 - 0.7 10e3/uL    Absolute Basophils 0.0 0.0 - 0.2 10e3/uL    Absolute Immature Granulocytes 0.1 <=0.4 10e3/uL    Absolute NRBCs 0.0 10e3/uL   Glucose CSF:   Result Value Ref Range    Glucose CSF 68 40 - 70 mg/dL    Narrative    CSF glucose concentrations are about 60 percent of normal plasma glucose.  CSF glucose concentrations are about 60 percent of normal plasma glucose.   Protein total CSF:   Result Value Ref Range    Protein total  (H) 15 - 60 mg/dL    Narrative    This is a lab developed test. It has not been cleared or approved  by the FDA.   Cerebrospinal fluid Aerobic Bacterial Culture Routine with Gram Stain    Specimen: Lumbar Puncture; Cerebrospinal fluid   Result Value Ref Range    Culture No growth, less than 1 day     Gram Stain Result No organisms seen     Gram Stain Result 4+ WBC seen     Narrative    Gram Stain quantification of host cells and microbiological organisms was done on a cytocentrifuged preparation.     CSF Cell Count with Differential:    Narrative    The following orders were created for panel order CSF Cell Count with Differential:.  Procedure                               Abnormality         Status                     ---------                               -----------         ------                     Cell Count CSF[[id number]]               Abnormal            Final result               Differential CSF[[id number]]                                 Final result                 Please view results for these tests on the individual orders.   Meningitis/Encephalitis Panel Qual PCR CSF:   Result Value Ref Range    Escherichia coli K1 Negative Negative    Haemophilus influenzae Negative Negative    Listeria monocytogenes Negative Negative    Neisseria meningitidis Negative Negative    Streptococcus agalactiae (GBS) Negative Negative    Streptococcus pneumoniae Negative Negative    Cytomegalovirus Negative Negative    Enterovirus Negative Negative    Herpes simplex virus 1 Negative Negative    Herpes simplex virus 2 Negative Negative    Human Herpes Virus 6 Negative Negative    Human parechovirus Negative Negative    Varicella zoster virus Negative Negative    Cryptococcus neoformans/gattii Negative Negative    Narrative    Assay performed using the FDA-cleared FilmArray ME Panel from [employer]    This test has been verified and is performed by the Infectious Diseases Diagnostic Laboratory at [facility]. This laboratory is certified under the Clinical Laboratory Improvement Amendments of 1988  (CLIA-88) as qualified to perform high complexity clinical laboratory testing.  A negative result does not rule out the presence of PCR inhibitors in the specimen or target nucleic acids are in concentration below the limit of detection of the assay.   A negative result should not rule out central nervous system infection with a high probability for meningitis or encephalitis. The assay does not test for all potential infectious agents.  Results are intended to aid in the diagnosis of illness and are meant to be used in conjunction with other clinical findings.   Cell Count CSF   Result Value Ref Range    Tube Number 1     Color Colorless Colorless    Clarity Hazy (A) Clear    Total Nucleated Cells 923 (H) 0 - 5 /uL    RBC Count 29 (H) 0 - 2 /uL   Differential CSF   Result Value Ref Range    % Neutrophils 89 %    % Lymphocytes 4 %    % Monocytes/Macrophages 7 %    Narrative    No reference ranges have been established. This result should be interpreted in the context of the patient's clinical condition and compared to simultaneous measurement in the patient's blood.   UA with Microscopic reflex to Culture    Specimen: Urine, Clean Catch   Result Value Ref Range    Color Urine Straw Colorless, Straw, Light Yellow, Yellow    Appearance Urine Clear Clear    Glucose Urine Negative Negative mg/dL    Bilirubin Urine Negative Negative    Ketones Urine 5  (A) Negative mg/dL    Specific Gravity Urine 1.008 1.003 - 1.035    Blood Urine Negative Negative    pH Urine 6.0 5.0 - 7.0    Protein Albumin Urine Negative Negative mg/dL    Urobilinogen Urine Normal Normal, 2.0 mg/dL    Nitrite Urine Negative Negative    Leukocyte Esterase Urine Negative Negative    RBC Urine <1 <=2 /HPF    WBC Urine <1 <=5 /HPF    Narrative    Urine Culture not indicated   -Lumbar Puncture    Narrative    Maicol Baer MD     5/19/2022  9:23 PM  [facility]    -Lumbar Puncture    Date/Time: 5/19/2022 7:58  PM  Performed by: Maicol Baer MD  Authorized by: Maicol Baer MD     Risks, benefits and alternatives discussed.      PRE-PROCEDURE DETAILS:     Procedure purpose:  Diagnostic    ANESTHESIA (see MAR for exact dosages):     Anesthesia method:  Local infiltration    Local anesthetic:  Lidocaine 1% w/o epi      PROCEDURE DETAILS:     Lumbar space:  L4-L5 interspace    Needle gauge:  22    Needle type:  Spinal needle - Quincke tip    Needle length (in):  3.5    Ultrasound guidance: no      Number of attempts:  1    Tubes of fluid:  4    Total volume (ml):  7    POST-PROCEDURE:     Puncture site:  Adhesive bandage applied        PROCEDURE    Patient Tolerance:  Patient tolerated the procedure well with no immediate   complications   Potassium   Result Value Ref Range    Potassium 3.7 3.4 - 5.3 mmol/L         My focused follow up physical exam shows:   Vitals:  B/P: 111/75, T: 98.6, P: 80, R: 15  Gen:  Pt appears stable and no apparent distress      ED Course & Medical Decision Making:  Fortunately a bed did open up here at [facility] and we were able to admit the patient here.  Patient has remained stable here.  Patient continues to give ceftriaxone and vancomycin for the presumed meningitis.  Viral culture did come back on the LP and it was negative.  Still waiting to see if the CSF grows anything bacterial.  Did discuss the case with kayla Carpio except the patient to the hospital floor.  Patient remained stable and is safe to be transferred to the floor at this time.         Impression and Disposition:  Meningitis           This note was completed in part using Dragon voice recognition, and may contain word and grammatical errors.        Jefe Lemus MD  05/20/22 9585

## 2022-05-20 NOTE — PROGRESS NOTES
Sepsis Evaluation Progress Note    I was called to see David Huang due to abnormal vital signs triggering the Sepsis SIRS screening alert. He is Suspected to have an infection (meningitis).     Physical Exam   Vital Signs:  Temp: 97  F (36.1  C) Temp src: Oral BP: 107/79 Pulse: 105   Resp: 16 SpO2: 99 % O2 Device: None (Room air)       General: in no acute distress  Mental Status: baseline mental status.     Remainder of physical exam is significant for normal respiratory effort, clear lungs, regular heart rate and rhythm, good radial pulse, normal capillary refill    Data   Lactic Acid   Date Value Ref Range Status   05/20/2022 2.3 (H) 0.7 - 2.0 mmol/L Final   05/19/2022 1.1 0.7 - 2.0 mmol/L Final       Assessment & Plan   David Huang meets SIRS criteria but does NOT have a lactate >2 or other evidence of acute organ damage.  These vital sign, lab and physical exam findings constitute a diagnosis of SEPSIS.    Sepsis Time-Zero (time Sepsis diagnosis confirmed): Earlier today 05/20/22    Anti-infectives (From now, onward)    Start     Dose/Rate Route Frequency Ordered Stop    05/21/22 0200  cefTRIAXone (ROCEPHIN) 2 g vial to attach to  ml bag for ADULTS or NS 50 ml bag for PEDS         2 g  over 30 Minutes Intravenous EVERY 12 HOURS 05/20/22 1632      05/19/22 1835  vancomycin (VANCOCIN) 1,500 mg in sodium chloride 0.9 % 250 mL intermittent infusion         20 mg/kg × 71.2 kg  over 90 Minutes Intravenous EVERY 8 HOURS 05/19/22 1831          Current antibiotic coverage is appropriate for source of infection.  Will administer 1 L LR bolus now and recheck lactic acid in 2 to 3 hours.    Disposition: The patient will remain on the current unit. We will continue to monitor this patient closely..  Leo Bryan MD    Sepsis Criteria   Sepsis: 2+ SIRS criteria due to infection  Severe Sepsis: Sepsis AND 1+ new sign of acute organ dysfunction (Note: lactate >2 or acute encephalopathy each  qualify as organ dysfunction)  Septic Shock: Sepsis AND hypotension despite volume resuscitation with 30 ml/kg crystalloid or lactate >=4  Note: HYPOTENSION is defined as 2 BP readings measured 3 hrs apart that have a SBP <90, MAP <65, or decrease >40 mmHg, occurring 6 hrs before or after t-zero

## 2022-05-20 NOTE — ED NOTES
Resting quietly on cart. Friend at bedside. Consumed most of clear liquid tray. Appears sleepy ut arouses easily.

## 2022-05-21 PROBLEM — Z11.52 ENCOUNTER FOR SCREENING LABORATORY TESTING FOR SEVERE ACUTE RESPIRATORY SYNDROME CORONAVIRUS 2 (SARS-COV-2): Status: ACTIVE | Noted: 2022-05-21

## 2022-05-21 PROBLEM — G00.8: Status: ACTIVE | Noted: 2022-05-21

## 2022-05-21 PROBLEM — E87.6 HYPOPOTASSEMIA: Status: ACTIVE | Noted: 2022-05-21

## 2022-05-21 LAB
ANION GAP SERPL CALCULATED.3IONS-SCNC: 4 MMOL/L (ref 3–14)
BASOPHILS # BLD AUTO: 0 10E3/UL (ref 0–0.2)
BASOPHILS NFR BLD AUTO: 1 %
BUN SERPL-MCNC: 12 MG/DL (ref 7–30)
CALCIUM SERPL-MCNC: 7.8 MG/DL (ref 8.5–10.1)
CHLORIDE BLD-SCNC: 112 MMOL/L (ref 94–109)
CO2 SERPL-SCNC: 25 MMOL/L (ref 20–32)
CREAT SERPL-MCNC: 0.68 MG/DL (ref 0.66–1.25)
CRP SERPL-MCNC: 20.3 MG/L (ref 0–8)
EOSINOPHIL # BLD AUTO: 0.1 10E3/UL (ref 0–0.7)
EOSINOPHIL NFR BLD AUTO: 2 %
ERYTHROCYTE [DISTWIDTH] IN BLOOD BY AUTOMATED COUNT: 13.3 % (ref 10–15)
GFR SERPL CREATININE-BSD FRML MDRD: >90 ML/MIN/1.73M2
GLUCOSE BLD-MCNC: 106 MG/DL (ref 70–99)
HCT VFR BLD AUTO: 37.2 % (ref 40–53)
HGB BLD-MCNC: 12.9 G/DL (ref 13.3–17.7)
IMM GRANULOCYTES # BLD: 0 10E3/UL
IMM GRANULOCYTES NFR BLD: 0 %
LYMPHOCYTES # BLD AUTO: 1.4 10E3/UL (ref 0.8–5.3)
LYMPHOCYTES NFR BLD AUTO: 20 %
MAGNESIUM SERPL-MCNC: 1.8 MG/DL (ref 1.6–2.3)
MAGNESIUM SERPL-MCNC: 1.9 MG/DL (ref 1.6–2.3)
MCH RBC QN AUTO: 32 PG (ref 26.5–33)
MCHC RBC AUTO-ENTMCNC: 34.7 G/DL (ref 31.5–36.5)
MCV RBC AUTO: 92 FL (ref 78–100)
MONOCYTES # BLD AUTO: 0.6 10E3/UL (ref 0–1.3)
MONOCYTES NFR BLD AUTO: 8 %
NEUTROPHILS # BLD AUTO: 4.8 10E3/UL (ref 1.6–8.3)
NEUTROPHILS NFR BLD AUTO: 69 %
NRBC # BLD AUTO: 0 10E3/UL
NRBC BLD AUTO-RTO: 0 /100
PLATELET # BLD AUTO: 189 10E3/UL (ref 150–450)
POTASSIUM BLD-SCNC: 3.5 MMOL/L (ref 3.4–5.3)
PROCALCITONIN SERPL-MCNC: 0.12 NG/ML
RBC # BLD AUTO: 4.03 10E6/UL (ref 4.4–5.9)
SODIUM SERPL-SCNC: 141 MMOL/L (ref 133–144)
WBC # BLD AUTO: 6.9 10E3/UL (ref 4–11)

## 2022-05-21 PROCEDURE — 250N000011 HC RX IP 250 OP 636: Performed by: NURSE PRACTITIONER

## 2022-05-21 PROCEDURE — 80048 BASIC METABOLIC PNL TOTAL CA: CPT | Performed by: NURSE PRACTITIONER

## 2022-05-21 PROCEDURE — 36415 COLL VENOUS BLD VENIPUNCTURE: CPT | Performed by: NURSE PRACTITIONER

## 2022-05-21 PROCEDURE — 86140 C-REACTIVE PROTEIN: CPT | Performed by: NURSE PRACTITIONER

## 2022-05-21 PROCEDURE — 85025 COMPLETE CBC W/AUTO DIFF WBC: CPT | Performed by: NURSE PRACTITIONER

## 2022-05-21 PROCEDURE — 99233 SBSQ HOSP IP/OBS HIGH 50: CPT | Performed by: NURSE PRACTITIONER

## 2022-05-21 PROCEDURE — 120N000001 HC R&B MED SURG/OB

## 2022-05-21 PROCEDURE — 83735 ASSAY OF MAGNESIUM: CPT | Performed by: NURSE PRACTITIONER

## 2022-05-21 PROCEDURE — 258N000003 HC RX IP 258 OP 636: Performed by: NURSE PRACTITIONER

## 2022-05-21 PROCEDURE — 84145 PROCALCITONIN (PCT): CPT | Performed by: NURSE PRACTITIONER

## 2022-05-21 PROCEDURE — 86780 TREPONEMA PALLIDUM: CPT | Performed by: NURSE PRACTITIONER

## 2022-05-21 RX ADMIN — CEFTRIAXONE SODIUM 2 G: 2 INJECTION, POWDER, FOR SOLUTION INTRAMUSCULAR; INTRAVENOUS at 02:05

## 2022-05-21 RX ADMIN — SODIUM CHLORIDE 150 ML/HR: 9 INJECTION, SOLUTION INTRAVENOUS at 23:45

## 2022-05-21 RX ADMIN — VANCOMYCIN HYDROCHLORIDE 1500 MG: 1 INJECTION, POWDER, LYOPHILIZED, FOR SOLUTION INTRAVENOUS at 18:22

## 2022-05-21 RX ADMIN — VANCOMYCIN HYDROCHLORIDE 1500 MG: 1 INJECTION, POWDER, LYOPHILIZED, FOR SOLUTION INTRAVENOUS at 11:18

## 2022-05-21 RX ADMIN — SODIUM CHLORIDE 150 ML/HR: 9 INJECTION, SOLUTION INTRAVENOUS at 06:36

## 2022-05-21 RX ADMIN — CEFTRIAXONE SODIUM 2 G: 2 INJECTION, POWDER, FOR SOLUTION INTRAMUSCULAR; INTRAVENOUS at 13:46

## 2022-05-21 RX ADMIN — VANCOMYCIN HYDROCHLORIDE 1500 MG: 1 INJECTION, POWDER, LYOPHILIZED, FOR SOLUTION INTRAVENOUS at 02:50

## 2022-05-21 RX ADMIN — SODIUM CHLORIDE 150 ML/HR: 9 INJECTION, SOLUTION INTRAVENOUS at 15:29

## 2022-05-21 ASSESSMENT — ACTIVITIES OF DAILY LIVING (ADL)
ADLS_ACUITY_SCORE: 20

## 2022-05-21 NOTE — PLAN OF CARE
"Goal Outcome Evaluation:    Plan of Care Reviewed With: patient     Overall Patient Progress: improving    Outcome Evaluation: Has denied headache in the last four hours. VSS Pt asked when he gets to go home and was told that cultures have not returned. Ate about 50%. Up independently in room. Ipad  used with success. IV fluids and abx continue.    PRIMARY DIAGNOSIS: \"GENERIC\" NURSING  OUTPATIENT/OBSERVATION GOALS TO BE MET BEFORE DISCHARGE:  ADLs back to baseline: Yes    Activity and level of assistance: Ambulating independently.    Pain status: Pain free.    Return to near baseline physical activity: Yes     Discharge Planner Nurse   Safe discharge environment identified: Yes  Barriers to discharge: Yes, tests not complete and is on IV abx       Entered by: Antionette Clark RN 05/21/2022 11:51 AM     Please review provider order for any additional goals.   Nurse to notify provider when observation goals have been met and patient is ready for discharge.  "

## 2022-05-21 NOTE — PLAN OF CARE
Goal Outcome Evaluation:    Plan of Care Reviewed With: patient, friend     Overall Patient Progress: improving    Outcome Evaluation: Continues to deny headache pain. Ate well. Up independently to use the bathroom. IV fluids and antibiotics continue. VSS Friends here visiting this evening.

## 2022-05-21 NOTE — PLAN OF CARE
Goal Outcome Evaluation:    Plan of Care Reviewed With: patient     Overall Patient Progress: improving    Outcome Evaluation: Feeling better. Continues to deny headaches. Tolerating regular diet. Voiding without difficulty. Still awaiting cultures. Order to switch patient to inpatient status.    S-(situation): Patient changed to inpatient status    B-(background): Patient status change due to observation goals not being met.     A-(assessment): As above. Up independently.     R-(recommendations): Will monitor patient per MD orders.       Inpatient nursing criteria listed below were met:    Adult Profile completedYes  Health care directives status obtained and documented: Yes  VTE prophylaxis orders: ambulation (FYI: Asprin is not an approved anticoagulation for DVT prophylaxis)  SCD's Documented: No, not ordered  Vaccine assessment done and vaccine ordered if needed. Not Applicable  My Chart patient sign up addressed and documented: Yes  Care Plan initiated: Yes  Discharge planning review completed (admission navigator) Yes

## 2022-05-21 NOTE — PROGRESS NOTES
"S-(situation): Patient registered to Observation. Patient arrived to room 272 via cart from ED    B-(background): Gabitis    A-(assessment): /70 (BP Location: Left arm)   Pulse 87   Temp 98.1  F (36.7  C) (Oral)   Resp 16   Ht 1.702 m (5' 7\")   Wt 76.2 kg (168 lb 1.6 oz)   SpO2 99%   BMI 26.33 kg/m   PT A&O to self.  Pt being treated for bacterial meningitis, no reports of pain,  utilized for pt speaks only [place]. Pt flagged for a lactic of 2.3 this shift bolus given, IV vancomycin given. Pt is SBA in room, otherwise independent. Pt from home with friends. LS clear, bowels sounds active x4.     R-(recommendations): Orders and observation goals reviewed with patient.    Nursing Observation criteria listed below was met:    Skin issues/needs documented:NA  Isolation needs addressed and Signage up: Yes  Fall Prevention: Education given and documented: NA  Education Assessment documented:Yes  Admission Education Documented: Yes  New medication patient education completed and documented (Possible Side Effects of Common Medications handout): Yes  OBS video/handout Reviewed & Documented: Yes  Allergies Reviewed: Yes  Medication Reconciliation Complete: Yes  Home medications if not able to send immediately home with family stored here: Sent home with friend   Reminder note placed in discharge instructions of home meds: NA  Patient has discharge needs (If yes, please explain): No  Patient discharge preferences addressed and charted on white board:  Yes  Provider notified that patient has arrived to the unit: Yes          "

## 2022-05-21 NOTE — PROGRESS NOTES
[facility]    Medicine Progress Note - Hospitalist Service    Date of Admission:  5/19/2022    Assessment & Plan               David Huang is a 32 year old male admitted on 5/19/2022. He initially presented to the emergency room on the morning of 5/19 for increased abdominal pain that started around 1 AM.  He was sent home with antiemetics and appointment to follow-up with PCP.  Patient return to the emergency room at approximately 5:30 PM with continued nausea vomiting and subjective fevers.  ED evaluation was significant for elevated WBC, CRP and LP with WBC, neutrophils, hazy clarity resulting in a suspicion for bacterial meningitis.  He is being admitted for treatment and continued monitoring of bacterial meningitis.    Active Problems:    Bacterial meningitis  Assessment: Positive for headache with neck pain, nausea vomiting    Monitor cultures from LP for bacterial meningitis    Rocephin 2 g every 12    Vancomycin pharmacy to dose    Given 10 mg dexamethasone in ED    CRP 69, repea 20.3 on 5/21    Lactate elevated overnight, now resolving      Severe sepsis (H)  Assessment: Patient was tachycardic with elevated lactate upon arrival to the emergency room    Lactic acid 1.3, elevated to 2.3 overnight received additional bolus 1L lactated Ringer's    Received 30 mL/KG in the ED prior to for presentation    Continue fluids at 150 an hour    Intermittent tachycardia    Temperature peaked at 99.3    WBC 6.9, down from 15.7 on admission    Continue Rocephin and vancomycin      Hypokalemia  Assessment: Suspect loss via emesis and dehydration    3.3 on arrival, after supplementation level has normalized    Replacement protocol       Diet: Regular Diet Adult    DVT Prophylaxis: Low Risk/Ambulatory with no VTE prophylaxis indicated  Reinoso Catheter: Not present  Central Lines: None  Cardiac Monitoring: None  Code Status: Full Code      Disposition Plan   Expected Discharge:   "2/22/22   Anticipated discharge location: home    Delays:    Language and culture results       The patient's care was discussed with the Attending Physician, Dr. Bryan, Bedside Nurse, Care Coordinator/ and Patient.    Ruiz Brar NP  Hospitalist Service  [facility]  Securely message with the Vocera Web Console (learn more here)  Text page via Sn[employer]shot Paging/Directory         Clinically Significant Risk Factors Present on Admission          # Hypocalcemia: Ca = 7.8 mg/dL (Ref range: 8.5 - 10.1 mg/dL) and/or iCa = N/A on admission, will replace as needed         # Overweight: Estimated body mass index is 26.33 kg/m  as calculated from the following:    Height as of this encounter: 1.702 m (5' 7\").    Weight as of this encounter: 76.2 kg (168 lb 1.6 oz).      ______________________________________________________________________    Interval History   With patient via .  Patient states he is feeling much better today and that he would like to go home.  It was explained to him that due to his possible bacterial meningitis infection it is not recommended that he leave the hospital until cultures have resulted.  Patient understands that bacterial meningitis is life-threatening and agrees that results are needed prior to discharge.  He is without additional complaint at this time.  States that headache, neck pain, weakness have subsided.    Data reviewed today: I reviewed all medications, new labs and imaging results over the last 24 hours. I personally reviewed no images or EKG's today.    Physical Exam   Vital Signs: Temp: 97.9  F (36.6  C) Temp src: Oral BP: 126/73 Pulse: 96   Resp: 16 SpO2: 98 % O2 Device: None (Room air)    Weight: 168 lbs 1.6 oz  Constitutional: awake, alert, cooperative, no apparent distress, and appears stated age  Respiratory: No increased work of breathing, good air exchange, clear to auscultation bilaterally, no crackles or " wheezing  Cardiovascular: normal apical pulses  and normal S1 and S2  GI: normal bowel sounds, non-distended and non-tender  Musculoskeletal: there is no redness, warmth, or swelling of the joints  full range of motion noted  motor strength is 5 out of 5 all extremities bilaterally  tone is normal  Neurologic: Mental Status Exam:  Level of Alertness:   awake  Orientation:   person, place, time  Memory:   normal  Attention/Concentration:  normal    Data   Recent Labs   Lab 05/21/22  0639 05/20/22  1641 05/20/22  0650 05/19/22  1900 05/19/22  0630   WBC 6.9  --   --  15.7* 17.3*   HGB 12.9*  --   --  14.4 14.8   MCV 92  --   --  91 90     --   --  249 229     --   --  140 141   POTASSIUM 3.5 3.8 3.7 3.3* 3.3*   CHLORIDE 112*  --   --  108 108   CO2 25  --   --  25 24   BUN 12  --   --  13 19   CR 0.68  --   --  0.88 0.90   ANIONGAP 4  --   --  7 9   LAKHWINDER 7.8*  --   --  8.5 8.8   *  --   --  139* 185*   ALBUMIN  --   --   --   --  4.3   PROTTOTAL  --   --   --   --  7.4   BILITOTAL  --   --   --   --  0.6   ALKPHOS  --   --   --   --  84   ALT  --   --   --   --  61   AST  --   --   --   --  34   LIPASE  --   --   --   --  81     No results found for this or any previous visit (from the past 24 hour(s)).  Medications     sodium chloride 150 mL/hr (05/21/22 0636)       cefTRIAXone  2 g Intravenous Q12H     sodium chloride (PF)  3 mL Intracatheter Q8H     vancomycin  20 mg/kg Intravenous Q8H

## 2022-05-21 NOTE — PROGRESS NOTES
"PRIMARY DIAGNOSIS: \"GENERIC\" NURSING  OUTPATIENT/OBSERVATION GOALS TO BE MET BEFORE DISCHARGE:  1. ADLs back to baseline: No    2. Activity and level of assistance: Up with standby assistance to manage IV     3. Pain status: Pain free.    4. Return to near baseline physical activity: No     Discharge Planner Nurse   Safe discharge environment identified: No  Barriers to discharge: No       Entered by: Antionette Barbosa RN 05/21/2022 5:00 AM     Please review provider order for any additional goals.   Nurse to notify provider when observation goals have been met and patient is ready for discharge.    Slept between cares. IV abx given. Up to bathroom as SBA to help manage IV pole. Denies headache or nausea. Awaiting CSF cultures.   "

## 2022-05-21 NOTE — PROGRESS NOTES
A referral for insurance/fiancial assistance was made at this time to Saadia Moran. It is anticipated she will call back during normal business hours.

## 2022-05-21 NOTE — PROGRESS NOTES
"PRIMARY DIAGNOSIS: \"GENERIC\" NURSING  OUTPATIENT/OBSERVATION GOALS TO BE MET BEFORE DISCHARGE:  1. ADLs back to baseline: No    Activity and level of assistance: Up with standby assistance to assist with IV    2. Pain status: Pain free.    3. Return to near baseline physical activity: No     Discharge Planner Nurse   Safe discharge environment identified: No  Barriers to discharge: No       Entered by: Antionette Barbosa RN 05/21/2022 5:01 AM     Please review provider order for any additional goals.   Nurse to notify provider when observation goals have been met and patient is ready for discharge.    Slept between cares, no acute changes overnight. Awaiting CSF cultures. Copy of financial assistance policy in [place] was provided in writing for patient to review.   "

## 2022-05-21 NOTE — PHARMACY-VANCOMYCIN DOSING SERVICE
Pharmacy Vancomycin Note  Date of Service May 20, 2022  Patient's  1990   32 year old, male    Indication: Meningitis  Day of Therapy: 2  Current vancomycin regimen:  1500 mg IV q8h  Current vancomycin monitoring method: Trough (Method 1 = dosing nomogram)  Current vancomycin therapeutic monitoring goal: 15-20 mg/L      Current estimated CrCl = Estimated Creatinine Clearance: 132.5 mL/min (based on SCr of 0.88 mg/dL).    Creatinine for last 3 days  2022:  6:30 AM Creatinine 0.90 mg/dL;  7:00 PM Creatinine 0.88 mg/dL    Recent Vancomycin Levels (past 3 days)  2022:  6:46 PM Vancomycin 17.1 mg/L    Vancomycin IV Administrations (past 72 hours)                   vancomycin (VANCOCIN) 1,500 mg in sodium chloride 0.9 % 250 mL intermittent infusion (mg) 1,500 mg New Bag 22 1122     1,500 mg New Bag  0247     1,500 mg New Bag 22 1907                Nephrotoxins and other renal medications (From now, onward)    Start     Dose/Rate Route Frequency Ordered Stop    22 1835  vancomycin (VANCOCIN) 1,500 mg in sodium chloride 0.9 % 250 mL intermittent infusion         20 mg/kg × 71.2 kg  over 90 Minutes Intravenous EVERY 8 HOURS 22 1831               Contrast Orders - past 72 hours (72h ago, onward)    None          Interpretation of levels and current regimen:  Vancomycin level is reflective of therapeutic level    Has serum creatinine changed greater than 50% in last 72 hours: No    Urine output:  unable to determine    Renal Function: Stable          Plan:  1. Continue Current Dose  2. Vancomycin monitoring method: Trough (Method 1 = dosing nomogram)  3. Vancomycin therapeutic monitoring goal: 15-20 mg/L  4. Pharmacy will check vancomycin levels as appropriate in 1-3 Days.  5. Serum creatinine levels will be ordered daily for the first week of therapy and at least twice weekly for subsequent weeks.    [facility] Del Castillo [facility]

## 2022-05-22 LAB
ANION GAP SERPL CALCULATED.3IONS-SCNC: 5 MMOL/L (ref 3–14)
BUN SERPL-MCNC: 11 MG/DL (ref 7–30)
CALCIUM SERPL-MCNC: 8.2 MG/DL (ref 8.5–10.1)
CHLORIDE BLD-SCNC: 113 MMOL/L (ref 94–109)
CO2 SERPL-SCNC: 24 MMOL/L (ref 20–32)
CREAT SERPL-MCNC: 0.78 MG/DL (ref 0.66–1.25)
CRP SERPL-MCNC: 12.8 MG/L (ref 0–8)
CRYPTOC AG SPEC QL: NEGATIVE
ERYTHROCYTE [DISTWIDTH] IN BLOOD BY AUTOMATED COUNT: 13.3 % (ref 10–15)
GFR SERPL CREATININE-BSD FRML MDRD: >90 ML/MIN/1.73M2
GLUCOSE BLD-MCNC: 94 MG/DL (ref 70–99)
HCT VFR BLD AUTO: 42.4 % (ref 40–53)
HGB BLD-MCNC: 13.9 G/DL (ref 13.3–17.7)
LACTATE SERPL-SCNC: 0.8 MMOL/L (ref 0.7–2)
MAGNESIUM SERPL-MCNC: 1.9 MG/DL (ref 1.6–2.3)
MCH RBC QN AUTO: 31.5 PG (ref 26.5–33)
MCHC RBC AUTO-ENTMCNC: 32.8 G/DL (ref 31.5–36.5)
MCV RBC AUTO: 96 FL (ref 78–100)
PLATELET # BLD AUTO: 202 10E3/UL (ref 150–450)
POTASSIUM BLD-SCNC: 3.6 MMOL/L (ref 3.4–5.3)
PROCALCITONIN SERPL-MCNC: 0.1 NG/ML
RBC # BLD AUTO: 4.41 10E6/UL (ref 4.4–5.9)
S PNEUM AG SPEC QL: NEGATIVE
SODIUM SERPL-SCNC: 142 MMOL/L (ref 133–144)
WBC # BLD AUTO: 5.3 10E3/UL (ref 4–11)

## 2022-05-22 PROCEDURE — 120N000001 HC R&B MED SURG/OB

## 2022-05-22 PROCEDURE — 87899 AGENT NOS ASSAY W/OPTIC: CPT | Performed by: NURSE PRACTITIONER

## 2022-05-22 PROCEDURE — 36415 COLL VENOUS BLD VENIPUNCTURE: CPT | Performed by: NURSE PRACTITIONER

## 2022-05-22 PROCEDURE — 84145 PROCALCITONIN (PCT): CPT | Performed by: NURSE PRACTITIONER

## 2022-05-22 PROCEDURE — 250N000011 HC RX IP 250 OP 636: Performed by: NURSE PRACTITIONER

## 2022-05-22 PROCEDURE — 83605 ASSAY OF LACTIC ACID: CPT | Performed by: NURSE PRACTITIONER

## 2022-05-22 PROCEDURE — 258N000003 HC RX IP 258 OP 636: Performed by: NURSE PRACTITIONER

## 2022-05-22 PROCEDURE — 82310 ASSAY OF CALCIUM: CPT | Performed by: NURSE PRACTITIONER

## 2022-05-22 PROCEDURE — 99233 SBSQ HOSP IP/OBS HIGH 50: CPT | Performed by: NURSE PRACTITIONER

## 2022-05-22 PROCEDURE — 85027 COMPLETE CBC AUTOMATED: CPT | Performed by: NURSE PRACTITIONER

## 2022-05-22 PROCEDURE — 83735 ASSAY OF MAGNESIUM: CPT | Performed by: NURSE PRACTITIONER

## 2022-05-22 PROCEDURE — 86140 C-REACTIVE PROTEIN: CPT | Performed by: NURSE PRACTITIONER

## 2022-05-22 RX ORDER — LIDOCAINE 40 MG/G
CREAM TOPICAL
Status: DISCONTINUED | OUTPATIENT
Start: 2022-05-22 | End: 2022-05-23 | Stop reason: HOSPADM

## 2022-05-22 RX ORDER — SODIUM CHLORIDE 9 MG/ML
INJECTION, SOLUTION INTRAVENOUS CONTINUOUS
Status: DISCONTINUED | OUTPATIENT
Start: 2022-05-22 | End: 2022-05-23 | Stop reason: HOSPADM

## 2022-05-22 RX ADMIN — VANCOMYCIN HYDROCHLORIDE 1500 MG: 1 INJECTION, POWDER, LYOPHILIZED, FOR SOLUTION INTRAVENOUS at 11:29

## 2022-05-22 RX ADMIN — CEFTRIAXONE SODIUM 2 G: 2 INJECTION, POWDER, FOR SOLUTION INTRAMUSCULAR; INTRAVENOUS at 13:35

## 2022-05-22 RX ADMIN — VANCOMYCIN HYDROCHLORIDE 1500 MG: 1 INJECTION, POWDER, LYOPHILIZED, FOR SOLUTION INTRAVENOUS at 18:15

## 2022-05-22 RX ADMIN — SODIUM CHLORIDE 150 ML/HR: 9 INJECTION, SOLUTION INTRAVENOUS at 09:07

## 2022-05-22 RX ADMIN — CEFTRIAXONE SODIUM 2 G: 2 INJECTION, POWDER, FOR SOLUTION INTRAMUSCULAR; INTRAVENOUS at 01:48

## 2022-05-22 RX ADMIN — VANCOMYCIN HYDROCHLORIDE 1500 MG: 1 INJECTION, POWDER, LYOPHILIZED, FOR SOLUTION INTRAVENOUS at 02:23

## 2022-05-22 ASSESSMENT — ACTIVITIES OF DAILY LIVING (ADL)
ADLS_ACUITY_SCORE: 20

## 2022-05-22 NOTE — PROGRESS NOTES
No reports of pain, IV fluids continued and IV anbiotics. Pt walked the halls with friends independently, stable on feet. VSS,

## 2022-05-22 NOTE — PLAN OF CARE
Goal Outcome Evaluation:    Plan of Care Reviewed With: patient     Overall Patient Progress: improving    Outcome Evaluation: Denies pain. Up ad theron. More labs collected and awaiting results of CSF as yet. IV fluids at 75ml/hr and receiving Rocephin and Vanco. Pleasant. VSS

## 2022-05-22 NOTE — PLAN OF CARE
Goal Outcome Evaluation:    Plan of Care Reviewed With: patient     Overall Patient Progress: improving    Outcome Evaluation: patient on room air, VSS, denies pain, up indep, IV fluids at 75, vancomycin and rocephin, midline to be placed today to continue antibiotics at home, eating and drinking well, K+ 3.6, Mg 1.9

## 2022-05-22 NOTE — PROGRESS NOTES
[facility]    Medicine Progress Note - Hospitalist Service    Date of Admission:  5/19/2022    Assessment & Plan           David Huang is a 32 year old male admitted on 5/19/2022. He initially presented to the emergency room on the morning of 5/19 for increased abdominal pain that started around 1 AM.  He was sent home with antiemetics and appointment to follow-up with PCP.  Patient return to the emergency room at approximately 5:30 PM with continued nausea vomiting and subjective fevers.  ED evaluation was significant for elevated WBC, CRP and LP with WBC, neutrophils, hazy clarity resulting in a suspicion for bacterial meningitis.  He is being admitted for treatment and continued monitoring of bacterial meningitis.    Active Problems:    Bacterial meningitis  Assessment: Positive for headache with neck pain, nausea vomiting    Monitor cultures from LP for bacterial meningitis    Rocephin 2 g every 12    Vancomycin pharmacy to dose    Given 10 mg dexamethasone in ED    CRP 69, 20.3, 12.8 on 5/22    Lactate elevated overnight, now resolving    Infectious disease recommendations as of 5/22:    Urine pneumococcal antigen    HIV and syphilis screen    Cryptococcal assay of CSF if available    Vancomycin dosing dependent on pneumococcal antigen, if negative patient does not need vancomycin    Ceftriaxone for 7 days, 2 g/day starting 5/23 with total dosing to end on 5/26    Patient will need midline upon discharge for continued daily antibiotics    Midline order placed.      Severe sepsis (H)  Assessment: Patient was tachycardic with elevated lactate upon arrival to the emergency room    Lactic acid 1.3, elevated to 2.3 overnight on 5/21 received additional bolus 1L lactated Ringer's    Received 30 mL/KG in the ED prior to for presentation    Reduced fluids to 75 an hour    Intermittent tachycardia    Temperature peaked at 99.3 - normalized    WBC 5.3, down from 15.7 on  "admission    Continue Rocephin and vancomycin      Hypokalemia  Assessment: Suspect loss via emesis and dehydration    3.3 on arrival, after supplementation level has normalized    Replacement protocol       Diet: Regular Diet Adult    DVT Prophylaxis: Low Risk/Ambulatory with no VTE prophylaxis indicated  Reinoso Catheter: Not present  Central Lines: None  Cardiac Monitoring: None  Code Status: Full Code      Disposition Plan   Expected Discharge: 05/23/2022 2/22/22   Anticipated discharge location: home    Delays:    Language and culture results       The patient's care was discussed with the Attending Physician, Dr. Bryan, Bedside Nurse, Care Coordinator/ and Patient.    Ruiz Brar NP  Hospitalist Service  [facility]  Securely message with the Vocera Web Console (learn more here)  Text page via [employer] Paging/Directory         Clinically Significant Risk Factors Present on Admission              # Overweight: Estimated body mass index is 26.33 kg/m  as calculated from the following:    Height as of this encounter: 1.702 m (5' 7\").    Weight as of this encounter: 76.2 kg (168 lb 1.6 oz).      ______________________________________________________________________    Interval History   Patient states that he is doing well today.  Due to language barrier he does not fully understand why he is still in the hospital.  It was explained to him that bacterial meningitis can be a fatal infection and that we are being proactive with his treatment.  Infectious disease recommends midline and 7 days of 2g TME is on Rocephin following discharge    Data reviewed today: I reviewed all medications, new labs and imaging results over the last 24 hours. I personally reviewed no images or EKG's today.    Physical Exam   Vital Signs: Temp: 98.5  F (36.9  C) Temp src: Oral BP: 116/71 Pulse: 83   Resp: 18 SpO2: 97 % O2 Device: None (Room air)    Weight: 168 lbs 1.6 oz  Constitutional: awake, " alert, cooperative, no apparent distress, and appears stated age  Respiratory: No increased work of breathing, good air exchange, clear to auscultation bilaterally, no crackles or wheezing  Cardiovascular: normal apical pulses  and normal S1 and S2  GI: normal bowel sounds, non-distended and non-tender  Musculoskeletal: there is no redness, warmth, or swelling of the joints  full range of motion noted  motor strength is 5 out of 5 all extremities bilaterally  tone is normal  Neurologic: Mental Status Exam:  Level of Alertness:   awake  Orientation:   person, place, time  Memory:   normal  Attention/Concentration:  normal    Data   Recent Labs   Lab 05/22/22  0610 05/22/22  0606 05/21/22  0639 05/20/22  1641 05/20/22  0650 05/19/22  1900 05/19/22  0630   WBC  --  5.3 6.9  --   --  15.7* 17.3*   HGB  --  13.9 12.9*  --   --  14.4 14.8   MCV  --  96 92  --   --  91 90   PLT  --  202 189  --   --  249 229     --  141  --   --  140 141   POTASSIUM 3.6  --  3.5 3.8   < > 3.3* 3.3*   CHLORIDE 113*  --  112*  --   --  108 108   CO2 24  --  25  --   --  25 24   BUN 11  --  12  --   --  13 19   CR 0.78  --  0.68  --   --  0.88 0.90   ANIONGAP 5  --  4  --   --  7 9   LAKHWINDER 8.2*  --  7.8*  --   --  8.5 8.8   GLC 94  --  106*  --   --  139* 185*   ALBUMIN  --   --   --   --   --   --  4.3   PROTTOTAL  --   --   --   --   --   --  7.4   BILITOTAL  --   --   --   --   --   --  0.6   ALKPHOS  --   --   --   --   --   --  84   ALT  --   --   --   --   --   --  61   AST  --   --   --   --   --   --  34   LIPASE  --   --   --   --   --   --  81    < > = values in this interval not displayed.     No results found for this or any previous visit (from the past 24 hour(s)).  Medications     sodium chloride 75 mL/hr at 05/22/22 1006       cefTRIAXone  2 g Intravenous Q12H     sodium chloride (PF)  3 mL Intracatheter Q8H     vancomycin  20 mg/kg Intravenous Q8H

## 2022-05-22 NOTE — PROGRESS NOTES
Called for midline-patient has rocephin ordered through 5/26 and if pneumonococcal test is positive will require vanco for at least 1 week and will need a PICC.  Discussed with Ruiz Brar and decision was made to wait and see which line is needed instead of potentially placing the incorrrect line.  Charge nurse and bedside are aware to call [facility] back when the results are back and let us know which line they need, if any.

## 2022-05-22 NOTE — PLAN OF CARE
Goal Outcome Evaluation:    Plan of Care Reviewed With: patient     Overall Patient Progress: no change    Outcome Evaluation: Continues to deny headache pain. Slept between cares. Up independently to the bathroom. IV fluids and antibiotics continue. VSS. Awaiting CSF cultures.

## 2022-05-23 VITALS
HEIGHT: 67 IN | RESPIRATION RATE: 16 BRPM | DIASTOLIC BLOOD PRESSURE: 58 MMHG | OXYGEN SATURATION: 98 % | SYSTOLIC BLOOD PRESSURE: 118 MMHG | BODY MASS INDEX: 26.38 KG/M2 | HEART RATE: 82 BPM | TEMPERATURE: 97.6 F | WEIGHT: 168.1 LBS

## 2022-05-23 LAB
HIV 1+2 AB+HIV1 P24 AG SERPL QL IA: NONREACTIVE
MAGNESIUM SERPL-MCNC: 1.9 MG/DL (ref 1.6–2.3)
POTASSIUM BLD-SCNC: 3.6 MMOL/L (ref 3.4–5.3)

## 2022-05-23 PROCEDURE — 258N000003 HC RX IP 258 OP 636: Performed by: NURSE PRACTITIONER

## 2022-05-23 PROCEDURE — 84132 ASSAY OF SERUM POTASSIUM: CPT | Performed by: NURSE PRACTITIONER

## 2022-05-23 PROCEDURE — 99239 HOSP IP/OBS DSCHRG MGMT >30: CPT | Performed by: NURSE PRACTITIONER

## 2022-05-23 PROCEDURE — 83735 ASSAY OF MAGNESIUM: CPT | Performed by: NURSE PRACTITIONER

## 2022-05-23 PROCEDURE — 250N000011 HC RX IP 250 OP 636: Performed by: NURSE PRACTITIONER

## 2022-05-23 PROCEDURE — 36415 COLL VENOUS BLD VENIPUNCTURE: CPT | Performed by: NURSE PRACTITIONER

## 2022-05-23 RX ORDER — DIPHENHYDRAMINE HYDROCHLORIDE 50 MG/ML
50 INJECTION INTRAMUSCULAR; INTRAVENOUS
Status: CANCELLED
Start: 2022-05-24

## 2022-05-23 RX ORDER — ALBUTEROL SULFATE 0.83 MG/ML
2.5 SOLUTION RESPIRATORY (INHALATION)
Status: CANCELLED | OUTPATIENT
Start: 2022-05-24

## 2022-05-23 RX ORDER — METHYLPREDNISOLONE SODIUM SUCCINATE 125 MG/2ML
125 INJECTION, POWDER, LYOPHILIZED, FOR SOLUTION INTRAMUSCULAR; INTRAVENOUS
Status: CANCELLED
Start: 2022-05-24

## 2022-05-23 RX ORDER — CEFTRIAXONE 2 G/1
2 INJECTION, POWDER, FOR SOLUTION INTRAMUSCULAR; INTRAVENOUS EVERY 24 HOURS
Qty: 100 ML | Refills: 0 | COMMUNITY
Start: 2022-05-23 | End: 2022-05-23

## 2022-05-23 RX ORDER — NALOXONE HYDROCHLORIDE 0.4 MG/ML
0.2 INJECTION, SOLUTION INTRAMUSCULAR; INTRAVENOUS; SUBCUTANEOUS
Status: CANCELLED | OUTPATIENT
Start: 2022-05-24

## 2022-05-23 RX ORDER — EPINEPHRINE 1 MG/ML
0.3 INJECTION, SOLUTION INTRAMUSCULAR; SUBCUTANEOUS EVERY 5 MIN PRN
Status: CANCELLED | OUTPATIENT
Start: 2022-05-24

## 2022-05-23 RX ORDER — ALBUTEROL SULFATE 90 UG/1
1-2 AEROSOL, METERED RESPIRATORY (INHALATION)
Status: CANCELLED
Start: 2022-05-24

## 2022-05-23 RX ORDER — CEFTRIAXONE 2 G/1
2 INJECTION, POWDER, FOR SOLUTION INTRAMUSCULAR; INTRAVENOUS EVERY 24 HOURS
Refills: 0 | COMMUNITY
Start: 2022-05-23 | End: 2022-05-27

## 2022-05-23 RX ORDER — MEPERIDINE HYDROCHLORIDE 25 MG/ML
25 INJECTION INTRAMUSCULAR; INTRAVENOUS; SUBCUTANEOUS EVERY 30 MIN PRN
Status: CANCELLED | OUTPATIENT
Start: 2022-05-24

## 2022-05-23 RX ADMIN — CEFTRIAXONE SODIUM 2 G: 2 INJECTION, POWDER, FOR SOLUTION INTRAMUSCULAR; INTRAVENOUS at 14:25

## 2022-05-23 RX ADMIN — VANCOMYCIN HYDROCHLORIDE 1500 MG: 1 INJECTION, POWDER, LYOPHILIZED, FOR SOLUTION INTRAVENOUS at 03:04

## 2022-05-23 RX ADMIN — CEFTRIAXONE SODIUM 2 G: 2 INJECTION, POWDER, FOR SOLUTION INTRAMUSCULAR; INTRAVENOUS at 02:10

## 2022-05-23 RX ADMIN — VANCOMYCIN HYDROCHLORIDE 1500 MG: 1 INJECTION, POWDER, LYOPHILIZED, FOR SOLUTION INTRAVENOUS at 11:39

## 2022-05-23 RX ADMIN — SODIUM CHLORIDE: 9 INJECTION, SOLUTION INTRAVENOUS at 02:10

## 2022-05-23 ASSESSMENT — ACTIVITIES OF DAILY LIVING (ADL)
ADLS_ACUITY_SCORE: 20

## 2022-05-23 NOTE — DISCHARGE SUMMARY
[facility]  Hospitalist Discharge Summary      Date of Admission:  5/19/2022  Date of Discharge:  5/23/2022  Discharging Provider: Ruiz Brar NP  Discharge Service: Hospitalist Service    Discharge Diagnoses   Bacterial meningitis    Follow-ups Needed After Discharge   Follow-up Appointments     Follow-up and recommended labs and tests       Follow up with primary care provider, Physician No Ref-Primary, within 7   days to evaluate medication change, for hospital follow- up, and regarding   new diagnosis.  The following labs/tests are recommended: repeat CBC, BMP,   CRP is recommended for resolution.         Follow-up and recommended labs and tests       Follow up with primary care provider, Physician No Ref-Primary, within 7   days to evaluate medication change and for hospital follow- up.  No follow   up labs or test are needed.      If you experience head and neck stiffness please follow up with Primary   care provider or the ED as soon as possible.             Unresulted Labs Ordered in the Past 30 Days of this Admission     Date and Time Order Name Status Description    5/22/2022 12:44 PM Fungal or Yeast Culture Routine Preliminary     5/22/2022 12:00 PM Treponema pallidum antibody confirm In process     5/19/2022  6:21 PM Cerebrospinal fluid Aerobic Bacterial Culture Routine with Gram Stain Preliminary     5/19/2022  6:21 PM Blood Culture Arm, Right Preliminary     5/19/2022  6:21 PM Blood Culture Arm, Left Preliminary       These results will be followed up by PCP    Discharge Disposition   Discharged to home  Condition at discharge: Stable    Hospital Course       David Huang is a 32 year old male admitted on 5/19/2022. He initially presented to the emergency room on the morning of 5/19 for increased abdominal pain that started around 1 AM.  He was sent home with antiemetics and appointment to follow-up with PCP.  Patient return to the emergency room at  approximately 5:30 PM with continued nausea vomiting and subjective fevers.  ED evaluation was significant for elevated WBC, CRP and LP with WBC, neutrophils, hazy clarity resulting in a suspicion for bacterial meningitis.  He was admitted to the hospital and started on vancomycin and Rocephin.  Vancomycin was discontinued on 5/23 at infectious disease recommendation following a negative urine pneumococcal antigen test.  Infectious diseases recommended patient continue to receive 2G Rocephin for total of 7 days, this would mean ending on 5/27/2022.  Patient has remained afebrile and asymptomatic during admission, cultures for CSF have no growth after 72 hours.  Patient is instructed to follow-up with PCP and continue with outpatient infusion clinic for treatment of suspected bacterial meningitis.    Active Problems:    Bacterial meningitis  Assessment: Positive for headache with neck pain, nausea vomiting    Monitor cultures from LP for bacterial meningitis    Continue Rocephin 2 g every day until 5/27/2022    Infectious disease recommendations as of 5/22:    Urine pneumococcal antigen - negative    HIV and syphilis screen -negative    Cryptococcal assay of CSF if available -negative    Vancomycin dosing dependent on pneumococcal antigen, if negative patient does not need vancomycin, vancomycin discontinued on 5/23    Ceftriaxone for 7 days, 2 g twice daily until 5/23 then dosing daily until 5/27    Patient is okay to have new PIV started for daily infusion.      Severe sepsis (H)  Assessment: Patient was tachycardic with elevated lactate upon arrival to the emergency room    Elevated lactate while inpatient, resolved after fluid bolus    Initiated in the ED due to tachycardia, temp 99.3, elevated WBC    Treated with Rocephin and vancomycin     Resolved      Hypokalemia  Assessment: Suspect loss via emesis and dehydration    Resolved prior to discharge l    Consultations This Hospital Stay   PHARMACY TO DOSE  VANCO  PHARMACY TO DOSE VANCO  VASCULAR ACCESS ADULT IP CONSULT  CARE MANAGEMENT / SOCIAL WORK IP CONSULT    Code Status   Full Code    Time Spent on this Encounter   I, Ruiz Brar NP, personally saw the patient today and spent greater than 30 minutes discharging this patient.       Ruiz Brar NP  [facility] 2A MEDICAL SURGICAL  [address]   [place] [address]  Phone: [phone]  ______________________________________________________________________    Physical Exam   Vital Signs: Temp: 97.6  F (36.4  C) Temp src: Oral BP: 118/58 Pulse: 82   Resp: 16 SpO2: 98 % O2 Device: None (Room air)    Weight: 168 lbs 1.6 oz  Constitutional: awake, alert, cooperative, no apparent distress, and appears stated age  Respiratory: No increased work of breathing, good air exchange, clear to auscultation bilaterally, no crackles or wheezing  Cardiovascular: normal apical pulses  and normal S1 and S2  Musculoskeletal: no lower extremity pitting edema present  there is no redness, warmth, or swelling of the joints  full range of motion noted  motor strength is 5 out of 5 all extremities bilaterally  tone is normal  Neurologic: Awake, alert, oriented to name, place and time.  Cranial nerves II-XII are grossly intact.  Motor is 5 out of 5 bilaterally.  Cerebellar finger to nose, heel to shin intact.  Sensory is intact.  Babinski down going, Romberg negative, and gait is normal.       Primary Care Physician   Physician No Ref-Primary    Discharge Orders      INFUSION THERAPY-NON CHEMO    David Huang, : 1990  Body surface area is 1.9 meters squared. Body mass index is 26.33 kg/m .      IF APPROPRIATE INDICATE:  Day 1 = 2G Rocephin IV every day until 22    Rocephin order    Date of Infusion:   22 then daily for 4 days  Diagnosis: 4 days    Bacterial meningitis    Premedicate with:  NO premedications are needed      Med Name: Rocephin   Med Dose:  2 Gram  IV   Patient Weight:  168 lbs 1.6 oz      Guidelines:    Pre-Infusion Labs: none  Vitals: T, P, R, and BP should be monitored before and after infusion.    Pharmacy:   Rocephin will be mixed in Pharmacy per Pharmacy protocol.    Titration: administer at Rate recommended by Pharmacy    Reaction: Notify  [facility] Hospitalist or Consult Rapid Response if reaction noted.  Stop infusion and call for orders.     Reason for your hospital stay    Bacterial meningitis     Follow-up and recommended labs and tests     Follow up with primary care provider, Physician No Ref-Primary, within 7 days to evaluate medication change, for hospital follow- up, and regarding new diagnosis.  The following labs/tests are recommended: repeat CBC, BMP, CRP is recommended for resolution.     Activity    Your activity upon discharge: activity as tolerated     Reason for your hospital stay    Bacterial meningitis     Activity    Your activity upon discharge: activity as tolerated     Follow-up and recommended labs and tests     Follow up with primary care provider, Physician No Ref-Primary, within 7 days to evaluate medication change and for hospital follow- up.  No follow up labs or test are needed.      If you experience head and neck stiffness please follow up with Primary care provider or the ED as soon as possible.     Diet    Follow this diet upon discharge: Orders Placed This Encounter      Regular Diet Adult     Diet    Follow this diet upon discharge: Orders Placed This Encounter      Regular Diet Adult       Significant Results and Procedures   Results for orders placed or performed during the hospital encounter of 05/19/22   CT Head w/o Contrast    Narrative    EXAM: CT HEAD W/O CONTRAST  LOCATION: [facility]  DATE/TIME: 5/19/2022 6:41 PM    INDICATION: Severe headache  COMPARISON: None.  TECHNIQUE: Routine CT Head without IV contrast. Multiplanar reformats. Dose reduction techniques were used.    FINDINGS:  INTRACRANIAL  CONTENTS: No intracranial hemorrhage, extraaxial collection, or mass effect.  No CT evidence of acute infarct. Normal parenchymal attenuation. Normal ventricles and sulci.     VISUALIZED ORBITS/SINUSES/MASTOIDS: No intraorbital abnormality. No paranasal sinus mucosal disease. No middle ear or mastoid effusion.    BONES/SOFT TISSUES: No acute abnormality.      Impression    IMPRESSION:  1.  No acute intracranial process.       Discharge Medications   Current Discharge Medication List      START taking these medications    Details   cefTRIAXone (ROCEPHIN) 2 GM vial Inject 2 g into the vein every 24 hours for 5 days  Qty: 100 mL, Refills: 0         CONTINUE these medications which have NOT CHANGED    Details   Acetaminophen (TYLENOL PO)       NAPROXEN PO       ondansetron (ZOFRAN ODT) 4 MG ODT tab Take 1 tablet (4 mg) by mouth every 6 hours as needed for nausea  Qty: 15 tablet, Refills: 0           Allergies   No Known Allergies     The use of Dragon/[employer] dictation services may have been used to construct the content in this note; any grammatical or spelling errors are non-intentional. Please contact the author of this note directly if you are in need of any clarification.

## 2022-05-23 NOTE — PROGRESS NOTES
Care Management Discharge Note    Discharge Date: 05/23/2022     Discharge Disposition:      Discharge Services:  Outpatient Infusion Therapy    Discharge DME:      Discharge Transportation: car, drives self    Private pay costs discussed: no inusrance - private pay    PAS Confirmation Code:      Patient/family educated on Medicare website which has current facility and service quality ratings:      Education Provided on the Discharge Plan:      Persons Notified of Discharge Plans: Patient    Patient/Family in Agreement with the Plan:      Handoff Referral Completed: No    Additional Information:  Patient discharging home.  He will follow up with outpatient infusion daily for IV-Rocephin until Saturday, 5/28/22.   Patient does not have insurance.  He will be billed self -pay.    BLANCA De Leon  [facility] [phone]/ [facility] [phone]  Care Management

## 2022-05-23 NOTE — PLAN OF CARE
Goal Outcome Evaluation:    Plan of Care Reviewed With: patient     Overall Patient Progress: no change    Outcome Evaluation: Patient has denied any pain, nausea or loose stools. Vitals have been stable. He is up independently in the room. Pt is eager to go home as he is feeling well now.

## 2022-05-23 NOTE — CONSULTS
Care Management Initial Consult    General Information  Assessment completed with: Care Team Member,    Type of CM/SW Visit: Progression of Care    Primary Care Provider verified and updated as needed:     Readmission within the last 30 days:        Reason for Consult: discharge planning  Advance Care Planning:          Communication Assessment  Patient's communication style: spoken language (non-English)    Hearing Difficulty or Deaf: no   Wear Glasses or Blind: no    Cognitive  Cognitive/Neuro/Behavioral: WDL  Level of Consciousness: lethargic  Arousal Level: opens eyes spontaneously  Orientation: oriented x 4             Living Environment:   People in home: friend(s)     Current living Arrangements: house      Able to return to prior arrangements:       Family/Social Support:  Care provided by:    Provides care for:        Description of Support System:         Current Resources:   Patient receiving home care services:       Community Resources:    Equipment currently used at home: none  Supplies currently used at home:      Employment/Financial:  Employment Status:          Financial Concerns: insurance, none   Referral to Financial Worker: Yes     Lifestyle & Psychosocial Needs:  Social Determinants of Health     Tobacco Use: Not on file   Alcohol Use: Not on file   Financial Resource Strain: Not on file   Food Insecurity: Not on file   Transportation Needs: Not on file   Physical Activity: Not on file   Stress: Not on file   Social Connections: Not on file   Intimate Partner Violence: Not on file   Depression: Not on file   Housing Stability: Not on file     Functional Status:  Prior to admission patient needed assistance:        Mental Health Status:        Chemical Dependency Status:        Values/Beliefs:  Spiritual, Cultural Beliefs, Advent Practices, Values that affect care:               Additional Information:  Referral received due to patient's need for IV-Antibiotics at discharge.  Referral made  to financial counselor, Saadia Moran.  Spoke with Saadia, after she reviewed information with patient.  Patient does NOT have insurance.  He is not a [place] citizen.  Patient is in the [place].  He is not eligible for any state MA programs, nor is is eligible for [facility] care through [place] due to being a non-citizen.  Working with provider, care management manager, and outpatient infusion to determine best plan of care.  Patient needs IV-Rocephin for 7 days at discharge.  Will continue to coordinate plan with patient/provider.    BLANCA De Leon  [facility] [phone]/ [facility] [phone]  Care Management

## 2022-05-24 ENCOUNTER — PATIENT OUTREACH (OUTPATIENT)
Dept: CARE COORDINATION | Facility: CLINIC | Age: 32
End: 2022-05-24

## 2022-05-24 ENCOUNTER — INFUSION THERAPY VISIT (OUTPATIENT)
Dept: INFUSION THERAPY | Facility: CLINIC | Age: 32
DRG: 871 | End: 2022-05-24
Attending: NURSE PRACTITIONER

## 2022-05-24 VITALS
SYSTOLIC BLOOD PRESSURE: 110 MMHG | HEART RATE: 80 BPM | TEMPERATURE: 98.4 F | RESPIRATION RATE: 16 BRPM | DIASTOLIC BLOOD PRESSURE: 70 MMHG | OXYGEN SATURATION: 99 %

## 2022-05-24 DIAGNOSIS — G00.9 BACTERIAL MENINGITIS: Primary | ICD-10-CM

## 2022-05-24 DIAGNOSIS — Z71.89 OTHER SPECIFIED COUNSELING: ICD-10-CM

## 2022-05-24 LAB
BACTERIA BLD CULT: NO GROWTH
BACTERIA BLD CULT: NO GROWTH
BACTERIA CSF CULT: NO GROWTH
GRAM STAIN RESULT: NORMAL
GRAM STAIN RESULT: NORMAL
T PALLIDUM AB SER QL AGGL: NON REACTIVE

## 2022-05-24 PROCEDURE — T1013 SIGN LANG/ORAL INTERPRETER: HCPCS | Mod: U3

## 2022-05-24 PROCEDURE — 250N000011 HC RX IP 250 OP 636: Performed by: NURSE PRACTITIONER

## 2022-05-24 RX ORDER — MEPERIDINE HYDROCHLORIDE 25 MG/ML
25 INJECTION INTRAMUSCULAR; INTRAVENOUS; SUBCUTANEOUS EVERY 30 MIN PRN
Status: CANCELLED | OUTPATIENT
Start: 2022-05-24

## 2022-05-24 RX ORDER — METHYLPREDNISOLONE SODIUM SUCCINATE 125 MG/2ML
125 INJECTION, POWDER, LYOPHILIZED, FOR SOLUTION INTRAMUSCULAR; INTRAVENOUS
Status: CANCELLED
Start: 2022-05-24

## 2022-05-24 RX ORDER — NALOXONE HYDROCHLORIDE 0.4 MG/ML
0.2 INJECTION, SOLUTION INTRAMUSCULAR; INTRAVENOUS; SUBCUTANEOUS
Status: CANCELLED | OUTPATIENT
Start: 2022-05-24

## 2022-05-24 RX ORDER — DIPHENHYDRAMINE HYDROCHLORIDE 50 MG/ML
50 INJECTION INTRAMUSCULAR; INTRAVENOUS
Status: CANCELLED
Start: 2022-05-24

## 2022-05-24 RX ORDER — ALBUTEROL SULFATE 90 UG/1
1-2 AEROSOL, METERED RESPIRATORY (INHALATION)
Status: CANCELLED
Start: 2022-05-24

## 2022-05-24 RX ORDER — CEFTRIAXONE 2 G/1
2 INJECTION, POWDER, FOR SOLUTION INTRAMUSCULAR; INTRAVENOUS EVERY 24 HOURS
Status: DISCONTINUED | OUTPATIENT
Start: 2022-05-24 | End: 2022-05-24 | Stop reason: HOSPADM

## 2022-05-24 RX ORDER — CEFTRIAXONE 2 G/1
2 INJECTION, POWDER, FOR SOLUTION INTRAMUSCULAR; INTRAVENOUS EVERY 24 HOURS
Status: CANCELLED
Start: 2022-05-24

## 2022-05-24 RX ORDER — EPINEPHRINE 1 MG/ML
0.3 INJECTION, SOLUTION INTRAMUSCULAR; SUBCUTANEOUS EVERY 5 MIN PRN
Status: CANCELLED | OUTPATIENT
Start: 2022-05-24

## 2022-05-24 RX ORDER — ALBUTEROL SULFATE 0.83 MG/ML
2.5 SOLUTION RESPIRATORY (INHALATION)
Status: CANCELLED | OUTPATIENT
Start: 2022-05-24

## 2022-05-24 RX ADMIN — CEFTRIAXONE SODIUM 2 G: 2 INJECTION, POWDER, FOR SOLUTION INTRAMUSCULAR; INTRAVENOUS at 15:04

## 2022-05-24 NOTE — PROGRESS NOTES
Clinic Care Coordination Contact  [facility]/Voicemail       Clinical Data: Care Coordinator Outreach  Outreach attempted x 1. The CHW was not able to leave a voicemail due to the  not able to make a call out to the patient.  Plan: Care Coordinator will try to reach patient again in 1-2 business days.    LARA Chang  [phone]  [facility]

## 2022-05-24 NOTE — PLAN OF CARE
S-(situation): Patient discharged to HOME via ambulation with friend.    B-(background): bacterial meningitis    A-(assessment): VSS. No c/o's pain. Up independently in room. Good diet, no c/o's nausea.    R-(recommendations): Discharge instructions reviewed with pt via  STEPHANIE. Listed belongings gathered and returned to patient.          Discharge Nursing Criteria:     Care Plan and Patient education resolved: Yes    New Medications- pt has been educated about purpose and side effects: Yes    Vaccines  Influenza status verified at discharge:  Not Applicable    MISC  Prescriptions if needed, hard copies sent with patient  NA  Home medications returned to patient: NA  Medication Bin checked and emptied on discharge Yes  Patient reports post-discharge pain management plan is effective: Yes

## 2022-05-24 NOTE — PROGRESS NOTES
Infusion Nursing Note:  David Huang presents today for Rocephin .    Patient seen by provider today: No   present during visit today: Yes, Language: Prydeinig.     Note: Patient arrived with Xin who transported him to IVO  When using the  service line. He asked when he can go back to work. I asked Xin and she thought Tuesday next week.       Intravenous Access:  Peripheral IV placed.    Treatment Conditions:  Not Applicable.      Post Infusion Assessment:  Patient tolerated infusion without incident.  Blood return noted pre and post infusion.  Site patent and intact, free from redness, edema or discomfort.  Access discontinued per protocol.       Discharge Plan:   Patient discharged in stable condition accompanied by: self.  Departure Mode: Ambulatory. Xin picking him up at front entrance.      Nilda Payne RN

## 2022-05-25 ENCOUNTER — INFUSION THERAPY VISIT (OUTPATIENT)
Dept: INFUSION THERAPY | Facility: CLINIC | Age: 32
End: 2022-05-25
Attending: NURSE PRACTITIONER

## 2022-05-25 VITALS
DIASTOLIC BLOOD PRESSURE: 62 MMHG | HEART RATE: 69 BPM | TEMPERATURE: 97 F | RESPIRATION RATE: 16 BRPM | SYSTOLIC BLOOD PRESSURE: 118 MMHG | OXYGEN SATURATION: 99 %

## 2022-05-25 DIAGNOSIS — G00.9 BACTERIAL MENINGITIS: Primary | ICD-10-CM

## 2022-05-25 PROCEDURE — 250N000011 HC RX IP 250 OP 636: Performed by: NURSE PRACTITIONER

## 2022-05-25 PROCEDURE — 96365 THER/PROPH/DIAG IV INF INIT: CPT

## 2022-05-25 RX ORDER — NALOXONE HYDROCHLORIDE 0.4 MG/ML
0.2 INJECTION, SOLUTION INTRAMUSCULAR; INTRAVENOUS; SUBCUTANEOUS
Status: CANCELLED | OUTPATIENT
Start: 2022-05-25

## 2022-05-25 RX ORDER — DIPHENHYDRAMINE HYDROCHLORIDE 50 MG/ML
50 INJECTION INTRAMUSCULAR; INTRAVENOUS
Status: CANCELLED
Start: 2022-05-25

## 2022-05-25 RX ORDER — ALBUTEROL SULFATE 90 UG/1
1-2 AEROSOL, METERED RESPIRATORY (INHALATION)
Status: CANCELLED
Start: 2022-05-25

## 2022-05-25 RX ORDER — MEPERIDINE HYDROCHLORIDE 25 MG/ML
25 INJECTION INTRAMUSCULAR; INTRAVENOUS; SUBCUTANEOUS EVERY 30 MIN PRN
Status: CANCELLED | OUTPATIENT
Start: 2022-05-25

## 2022-05-25 RX ORDER — CEFTRIAXONE 2 G/1
2 INJECTION, POWDER, FOR SOLUTION INTRAMUSCULAR; INTRAVENOUS EVERY 24 HOURS
Status: CANCELLED
Start: 2022-05-25

## 2022-05-25 RX ORDER — CEFTRIAXONE 2 G/1
2 INJECTION, POWDER, FOR SOLUTION INTRAMUSCULAR; INTRAVENOUS EVERY 24 HOURS
Status: DISCONTINUED | OUTPATIENT
Start: 2022-05-25 | End: 2022-05-25 | Stop reason: HOSPADM

## 2022-05-25 RX ORDER — METHYLPREDNISOLONE SODIUM SUCCINATE 125 MG/2ML
125 INJECTION, POWDER, LYOPHILIZED, FOR SOLUTION INTRAMUSCULAR; INTRAVENOUS
Status: CANCELLED
Start: 2022-05-25

## 2022-05-25 RX ORDER — ALBUTEROL SULFATE 0.83 MG/ML
2.5 SOLUTION RESPIRATORY (INHALATION)
Status: CANCELLED | OUTPATIENT
Start: 2022-05-25

## 2022-05-25 RX ORDER — EPINEPHRINE 1 MG/ML
0.3 INJECTION, SOLUTION INTRAMUSCULAR; SUBCUTANEOUS EVERY 5 MIN PRN
Status: CANCELLED | OUTPATIENT
Start: 2022-05-25

## 2022-05-25 RX ADMIN — CEFTRIAXONE SODIUM 2 G: 2 INJECTION, POWDER, FOR SOLUTION INTRAMUSCULAR; INTRAVENOUS at 14:48

## 2022-05-25 NOTE — PROGRESS NOTES
Clinic Care Coordination Contact  [facility]/Voicemail       Clinical Data: Care Coordinator Outreach  Outreach attempted x 2. The CHW was unable to leave a voicemail for the patient due to the call not being able to complete.  Plan: Care Coordinator will try to reach patient again in 1-2 business days.    LARA Chang  [phone]  [facility]

## 2022-05-25 NOTE — PROGRESS NOTES
Infusion Nursing Note:  David Huang presents today for Rocephin.    Patient seen by provider today: No   present during visit today: Not Applicable.    Note: Has no complaints. Eyes feel better..      Intravenous Access:  Peripheral IV placed.    Treatment Conditions:  Not Applicable.      Post Infusion Assessment:  Patient tolerated infusion without incident.  Blood return noted pre and post infusion.  Access discontinued per protocol.       Discharge Plan:   Patient discharged in stable condition accompanied by: self.  Departure Mode: Ambulatory.      Nilda Payne RN

## 2022-05-26 ENCOUNTER — INFUSION THERAPY VISIT (OUTPATIENT)
Dept: INFUSION THERAPY | Facility: CLINIC | Age: 32
End: 2022-05-26
Attending: NURSE PRACTITIONER

## 2022-05-26 VITALS
HEART RATE: 80 BPM | OXYGEN SATURATION: 98 % | RESPIRATION RATE: 16 BRPM | DIASTOLIC BLOOD PRESSURE: 63 MMHG | TEMPERATURE: 98.7 F | SYSTOLIC BLOOD PRESSURE: 103 MMHG

## 2022-05-26 DIAGNOSIS — G00.9 BACTERIAL MENINGITIS: Primary | ICD-10-CM

## 2022-05-26 PROCEDURE — 250N000011 HC RX IP 250 OP 636: Performed by: NURSE PRACTITIONER

## 2022-05-26 PROCEDURE — 96365 THER/PROPH/DIAG IV INF INIT: CPT

## 2022-05-26 RX ORDER — ALBUTEROL SULFATE 0.83 MG/ML
2.5 SOLUTION RESPIRATORY (INHALATION)
Status: CANCELLED | OUTPATIENT
Start: 2022-05-26

## 2022-05-26 RX ORDER — MEPERIDINE HYDROCHLORIDE 25 MG/ML
25 INJECTION INTRAMUSCULAR; INTRAVENOUS; SUBCUTANEOUS EVERY 30 MIN PRN
Status: CANCELLED | OUTPATIENT
Start: 2022-05-26

## 2022-05-26 RX ORDER — NALOXONE HYDROCHLORIDE 0.4 MG/ML
0.2 INJECTION, SOLUTION INTRAMUSCULAR; INTRAVENOUS; SUBCUTANEOUS
Status: CANCELLED | OUTPATIENT
Start: 2022-05-26

## 2022-05-26 RX ORDER — CEFTRIAXONE 2 G/1
2 INJECTION, POWDER, FOR SOLUTION INTRAMUSCULAR; INTRAVENOUS EVERY 24 HOURS
Status: DISCONTINUED | OUTPATIENT
Start: 2022-05-26 | End: 2022-05-26 | Stop reason: HOSPADM

## 2022-05-26 RX ORDER — CEFTRIAXONE 2 G/1
2 INJECTION, POWDER, FOR SOLUTION INTRAMUSCULAR; INTRAVENOUS EVERY 24 HOURS
Status: CANCELLED
Start: 2022-05-26

## 2022-05-26 RX ORDER — ALBUTEROL SULFATE 90 UG/1
1-2 AEROSOL, METERED RESPIRATORY (INHALATION)
Status: CANCELLED
Start: 2022-05-26

## 2022-05-26 RX ORDER — METHYLPREDNISOLONE SODIUM SUCCINATE 125 MG/2ML
125 INJECTION, POWDER, LYOPHILIZED, FOR SOLUTION INTRAMUSCULAR; INTRAVENOUS
Status: CANCELLED
Start: 2022-05-26

## 2022-05-26 RX ORDER — EPINEPHRINE 1 MG/ML
0.3 INJECTION, SOLUTION INTRAMUSCULAR; SUBCUTANEOUS EVERY 5 MIN PRN
Status: CANCELLED | OUTPATIENT
Start: 2022-05-26

## 2022-05-26 RX ORDER — DIPHENHYDRAMINE HYDROCHLORIDE 50 MG/ML
50 INJECTION INTRAMUSCULAR; INTRAVENOUS
Status: CANCELLED
Start: 2022-05-26

## 2022-05-26 RX ADMIN — CEFTRIAXONE SODIUM 2 G: 2 INJECTION, POWDER, FOR SOLUTION INTRAMUSCULAR; INTRAVENOUS at 15:05

## 2022-05-26 ASSESSMENT — PAIN SCALES - GENERAL: PAINLEVEL: NO PAIN (0)

## 2022-05-26 NOTE — PROGRESS NOTES
Infusion Nursing Note:  David Huang presents today for Ceftriaxone day #3.    Patient seen by provider today: No   present during visit today:  thru phone.    Note: Thru the , patient denies any problems or questions at this time. Patient states he is feeling better since abx started..      Intravenous Access:  Peripheral IV placed.    Treatment Conditions:  Not Applicable.      Post Infusion Assessment:  Patient tolerated infusion without incident.  Blood return noted pre and post infusion.  Site patent and intact, free from redness, edema or discomfort.  No evidence of extravasations.  Access discontinued per protocol.       Discharge Plan:   Discharge instructions reviewed with: Patient.  Patient and/or family verbalized understanding of discharge instructions and all questions answered.  Patient discharged in stable condition accompanied by: self.  Departure Mode: Ambulatory.      Bethany Badillo RN

## 2022-05-27 ENCOUNTER — INFUSION THERAPY VISIT (OUTPATIENT)
Dept: INFUSION THERAPY | Facility: CLINIC | Age: 32
End: 2022-05-27
Attending: NURSE PRACTITIONER

## 2022-05-27 VITALS
SYSTOLIC BLOOD PRESSURE: 111 MMHG | TEMPERATURE: 98.8 F | HEART RATE: 79 BPM | DIASTOLIC BLOOD PRESSURE: 60 MMHG | RESPIRATION RATE: 16 BRPM | OXYGEN SATURATION: 98 %

## 2022-05-27 DIAGNOSIS — G00.9 BACTERIAL MENINGITIS: Primary | ICD-10-CM

## 2022-05-27 PROCEDURE — 250N000011 HC RX IP 250 OP 636: Performed by: NURSE PRACTITIONER

## 2022-05-27 PROCEDURE — 96365 THER/PROPH/DIAG IV INF INIT: CPT

## 2022-05-27 RX ORDER — DIPHENHYDRAMINE HYDROCHLORIDE 50 MG/ML
50 INJECTION INTRAMUSCULAR; INTRAVENOUS
Status: CANCELLED
Start: 2022-05-27

## 2022-05-27 RX ORDER — NALOXONE HYDROCHLORIDE 0.4 MG/ML
0.2 INJECTION, SOLUTION INTRAMUSCULAR; INTRAVENOUS; SUBCUTANEOUS
Status: CANCELLED | OUTPATIENT
Start: 2022-05-27

## 2022-05-27 RX ORDER — ALBUTEROL SULFATE 0.83 MG/ML
2.5 SOLUTION RESPIRATORY (INHALATION)
Status: CANCELLED | OUTPATIENT
Start: 2022-05-27

## 2022-05-27 RX ORDER — CEFTRIAXONE 2 G/1
2 INJECTION, POWDER, FOR SOLUTION INTRAMUSCULAR; INTRAVENOUS EVERY 24 HOURS
Status: CANCELLED
Start: 2022-05-27

## 2022-05-27 RX ORDER — CEFTRIAXONE 2 G/1
2 INJECTION, POWDER, FOR SOLUTION INTRAMUSCULAR; INTRAVENOUS EVERY 24 HOURS
Status: DISCONTINUED | OUTPATIENT
Start: 2022-05-27 | End: 2022-05-27 | Stop reason: HOSPADM

## 2022-05-27 RX ORDER — ALBUTEROL SULFATE 90 UG/1
1-2 AEROSOL, METERED RESPIRATORY (INHALATION)
Status: CANCELLED
Start: 2022-05-27

## 2022-05-27 RX ORDER — EPINEPHRINE 1 MG/ML
0.3 INJECTION, SOLUTION INTRAMUSCULAR; SUBCUTANEOUS EVERY 5 MIN PRN
Status: CANCELLED | OUTPATIENT
Start: 2022-05-27

## 2022-05-27 RX ORDER — MEPERIDINE HYDROCHLORIDE 25 MG/ML
25 INJECTION INTRAMUSCULAR; INTRAVENOUS; SUBCUTANEOUS EVERY 30 MIN PRN
Status: CANCELLED | OUTPATIENT
Start: 2022-05-27

## 2022-05-27 RX ORDER — METHYLPREDNISOLONE SODIUM SUCCINATE 125 MG/2ML
125 INJECTION, POWDER, LYOPHILIZED, FOR SOLUTION INTRAMUSCULAR; INTRAVENOUS
Status: CANCELLED
Start: 2022-05-27

## 2022-05-27 RX ADMIN — CEFTRIAXONE SODIUM 2 G: 2 INJECTION, POWDER, FOR SOLUTION INTRAMUSCULAR; INTRAVENOUS at 15:19

## 2022-05-27 ASSESSMENT — PAIN SCALES - GENERAL: PAINLEVEL: NO PAIN (0)

## 2022-05-27 NOTE — PROGRESS NOTES
Infusion Nursing Note:  David Huang presents today for final dose Ceftriaxone.    Patient seen by provider today: No   present during visit today: Yes, Language: Indonesian. Phone Inerpreters Farhana ID# [id number] and Chao ID#[id number].     Note: Arrives ambulatory with Xin whom he is here on work Visa working for. Patient denies pain or any new or pertinent symptoms today when asked through . VSS, afebrile. Appears alert and healthy.       Intravenous Access:  Peripheral IV placed.    Treatment Conditions:  Not Applicable.      Post Infusion Assessment:  Patient tolerated infusion without incident. Denies pain or other symptoms.  Blood return noted pre and post infusion.  Site patent and intact, free from redness, edema or discomfort.  No evidence of extravasations.  PIV access discontinued per protocol.       Discharge Plan:   Patient and/or family verbalized understanding of discharge instructions and all questions answered.  Patient discharged in stable condition accompanied by: Xin, living at her residence currently with her family.  Departure Mode: Ambulatory.      Cely Meyer RN

## 2022-06-20 LAB — BACTERIA CSF CULT: NO GROWTH

## 2022-11-01 ENCOUNTER — HOSPITAL ENCOUNTER (EMERGENCY)
Facility: CLINIC | Age: 32
Discharge: HOME | End: 2022-11-01
Attending: FAMILY MEDICINE | Admitting: FAMILY MEDICINE

## 2022-11-01 ENCOUNTER — ANESTHESIA (OUTPATIENT)
Dept: EMERGENCY MEDICINE | Facility: CLINIC | Age: 32
DRG: 871 | End: 2022-11-01

## 2022-11-01 ENCOUNTER — ANESTHESIA EVENT (OUTPATIENT)
Dept: EMERGENCY MEDICINE | Facility: CLINIC | Age: 32
DRG: 871 | End: 2022-11-01

## 2022-11-01 ENCOUNTER — HOSPITAL ENCOUNTER (INPATIENT)
Facility: CLINIC | Age: 32
LOS: 6 days | Discharge: HOME | DRG: 871 | End: 2022-11-07
Attending: NURSE PRACTITIONER | Admitting: INTERNAL MEDICINE

## 2022-11-01 VITALS
TEMPERATURE: 100.1 F | HEART RATE: 122 BPM | DIASTOLIC BLOOD PRESSURE: 60 MMHG | SYSTOLIC BLOOD PRESSURE: 110 MMHG | RESPIRATION RATE: 22 BRPM | OXYGEN SATURATION: 98 %

## 2022-11-01 DIAGNOSIS — G03.9 MENINGITIS: ICD-10-CM

## 2022-11-01 DIAGNOSIS — E86.0 DEHYDRATION: ICD-10-CM

## 2022-11-01 DIAGNOSIS — G00.9 BACTERIAL MENINGITIS: Primary | ICD-10-CM

## 2022-11-01 DIAGNOSIS — R11.2 NAUSEA AND VOMITING, UNSPECIFIED VOMITING TYPE: ICD-10-CM

## 2022-11-01 DIAGNOSIS — G43.909 MIGRAINE WITHOUT STATUS MIGRAINOSUS, NOT INTRACTABLE, UNSPECIFIED MIGRAINE TYPE: ICD-10-CM

## 2022-11-01 DIAGNOSIS — E87.6 HYPOKALEMIA: ICD-10-CM

## 2022-11-01 LAB
ALBUMIN SERPL-MCNC: 3.8 G/DL (ref 3.4–5)
ALBUMIN SERPL-MCNC: 4.3 G/DL (ref 3.4–5)
ALBUMIN UR-MCNC: NEGATIVE MG/DL
ALP SERPL-CCNC: 72 U/L (ref 40–150)
ALP SERPL-CCNC: 74 U/L (ref 40–150)
ALT SERPL W P-5'-P-CCNC: 20 U/L (ref 0–70)
ALT SERPL W P-5'-P-CCNC: 23 U/L (ref 0–70)
ANION GAP SERPL CALCULATED.3IONS-SCNC: 6 MMOL/L (ref 3–14)
ANION GAP SERPL CALCULATED.3IONS-SCNC: 8 MMOL/L (ref 3–14)
APPEARANCE CSF: ABNORMAL
APPEARANCE UR: CLEAR
AST SERPL W P-5'-P-CCNC: 22 U/L (ref 0–45)
AST SERPL W P-5'-P-CCNC: 23 U/L (ref 0–45)
BASE EXCESS BLDV CALC-SCNC: 2.8 MMOL/L (ref -7.7–1.9)
BASOPHILS # BLD AUTO: 0 10E3/UL (ref 0–0.2)
BASOPHILS # BLD AUTO: 0 10E3/UL (ref 0–0.2)
BASOPHILS NFR BLD AUTO: 0 %
BASOPHILS NFR BLD AUTO: 0 %
BILIRUB SERPL-MCNC: 0.3 MG/DL (ref 0.2–1.3)
BILIRUB SERPL-MCNC: 0.4 MG/DL (ref 0.2–1.3)
BILIRUB UR QL STRIP: NEGATIVE
BUN SERPL-MCNC: 12 MG/DL (ref 7–30)
BUN SERPL-MCNC: 19 MG/DL (ref 7–30)
C GATTII+NEOFOR DNA CSF QL NAA+NON-PROBE: NEGATIVE
CALCIUM SERPL-MCNC: 8.5 MG/DL (ref 8.5–10.1)
CALCIUM SERPL-MCNC: 8.5 MG/DL (ref 8.5–10.1)
CHLORIDE BLD-SCNC: 110 MMOL/L (ref 94–109)
CHLORIDE BLD-SCNC: 112 MMOL/L (ref 94–109)
CMV DNA CSF QL NAA+NON-PROBE: NEGATIVE
CO2 SERPL-SCNC: 24 MMOL/L (ref 20–32)
CO2 SERPL-SCNC: 25 MMOL/L (ref 20–32)
COLOR CSF: COLORLESS
COLOR UR AUTO: NORMAL
CREAT SERPL-MCNC: 0.86 MG/DL (ref 0.66–1.25)
CREAT SERPL-MCNC: 0.97 MG/DL (ref 0.66–1.25)
CRP SERPL-MCNC: 50.4 MG/L (ref 0–8)
CRP SERPL-MCNC: 6.1 MG/L (ref 0–8)
DEPRECATED S PYO AG THROAT QL EIA: NEGATIVE
E COLI K1 AG CSF QL: NEGATIVE
EOSINOPHIL # BLD AUTO: 0 10E3/UL (ref 0–0.7)
EOSINOPHIL # BLD AUTO: 0 10E3/UL (ref 0–0.7)
EOSINOPHIL NFR BLD AUTO: 0 %
EOSINOPHIL NFR BLD AUTO: 0 %
ERYTHROCYTE [DISTWIDTH] IN BLOOD BY AUTOMATED COUNT: 12.2 % (ref 10–15)
ERYTHROCYTE [DISTWIDTH] IN BLOOD BY AUTOMATED COUNT: 12.5 % (ref 10–15)
ETHANOL SERPL-MCNC: 0.01 G/DL
EV RNA SPEC QL NAA+PROBE: NEGATIVE
FLUAV RNA SPEC QL NAA+PROBE: NEGATIVE
FLUBV RNA RESP QL NAA+PROBE: NEGATIVE
GFR SERPL CREATININE-BSD FRML MDRD: >90 ML/MIN/1.73M2
GFR SERPL CREATININE-BSD FRML MDRD: >90 ML/MIN/1.73M2
GLUCOSE BLD-MCNC: 147 MG/DL (ref 70–99)
GLUCOSE BLD-MCNC: 148 MG/DL (ref 70–99)
GLUCOSE CSF-MCNC: 68 MG/DL (ref 40–70)
GLUCOSE UR STRIP-MCNC: NEGATIVE MG/DL
GP B STREP DNA CSF QL NAA+NON-PROBE: NEGATIVE
GROUP A STREP BY PCR: NOT DETECTED
HAEM INFLU DNA CSF QL NAA+NON-PROBE: NEGATIVE
HCO3 BLDV-SCNC: 25 MMOL/L (ref 21–28)
HCT VFR BLD AUTO: 40.4 % (ref 40–53)
HCT VFR BLD AUTO: 41.4 % (ref 40–53)
HGB BLD-MCNC: 14.3 G/DL (ref 13.3–17.7)
HGB BLD-MCNC: 14.4 G/DL (ref 13.3–17.7)
HGB UR QL STRIP: NEGATIVE
HHV6 DNA CSF QL NAA+NON-PROBE: NEGATIVE
HOLD SPECIMEN: NORMAL
HSV1 DNA CSF QL NAA+NON-PROBE: NEGATIVE
HSV1 DNA CSF QL NAA+PROBE: NOT DETECTED
HSV2 DNA CSF QL NAA+NON-PROBE: NEGATIVE
HSV2 DNA CSF QL NAA+PROBE: NOT DETECTED
IMM GRANULOCYTES # BLD: 0 10E3/UL
IMM GRANULOCYTES # BLD: 0 10E3/UL
IMM GRANULOCYTES NFR BLD: 0 %
IMM GRANULOCYTES NFR BLD: 0 %
KETONES UR STRIP-MCNC: NEGATIVE MG/DL
L MONOCYTOG DNA CSF QL NAA+NON-PROBE: NEGATIVE
LACTATE SERPL-SCNC: 1 MMOL/L (ref 0.7–2)
LEUKOCYTE ESTERASE UR QL STRIP: NEGATIVE
LIPASE SERPL-CCNC: 119 U/L (ref 73–393)
LYMPH ABN NFR CSF MANUAL: 1 %
LYMPHOCYTES # BLD AUTO: 0.4 10E3/UL (ref 0.8–5.3)
LYMPHOCYTES # BLD AUTO: 0.4 10E3/UL (ref 0.8–5.3)
LYMPHOCYTES NFR BLD AUTO: 3 %
LYMPHOCYTES NFR BLD AUTO: 4 %
MAGNESIUM SERPL-MCNC: 2 MG/DL (ref 1.6–2.3)
MCH RBC QN AUTO: 30.8 PG (ref 26.5–33)
MCH RBC QN AUTO: 30.9 PG (ref 26.5–33)
MCHC RBC AUTO-ENTMCNC: 34.8 G/DL (ref 31.5–36.5)
MCHC RBC AUTO-ENTMCNC: 35.4 G/DL (ref 31.5–36.5)
MCV RBC AUTO: 87 FL (ref 78–100)
MCV RBC AUTO: 89 FL (ref 78–100)
MONOCYTES # BLD AUTO: 0.2 10E3/UL (ref 0–1.3)
MONOCYTES # BLD AUTO: 0.6 10E3/UL (ref 0–1.3)
MONOCYTES NFR BLD AUTO: 2 %
MONOCYTES NFR BLD AUTO: 4 %
MONOS+MACROS NFR CSF MANUAL: 15 %
N MEN DNA CSF QL NAA+NON-PROBE: NEGATIVE
NEUTROPHILS # BLD AUTO: 13 10E3/UL (ref 1.6–8.3)
NEUTROPHILS # BLD AUTO: 9.4 10E3/UL (ref 1.6–8.3)
NEUTROPHILS NFR BLD AUTO: 93 %
NEUTROPHILS NFR BLD AUTO: 94 %
NEUTROPHILS NFR CSF MANUAL: 84 %
NITRATE UR QL: NEGATIVE
NRBC # BLD AUTO: 0 10E3/UL
NRBC # BLD AUTO: 0 10E3/UL
NRBC BLD AUTO-RTO: 0 /100
NRBC BLD AUTO-RTO: 0 /100
O2/TOTAL GAS SETTING VFR VENT: 21 %
PARECHOVIRUS A RNA CSF QL NAA+NON-PROBE: NEGATIVE
PCO2 BLDV: 32 MM HG (ref 40–50)
PH BLDV: 7.51 [PH] (ref 7.32–7.43)
PH UR STRIP: 6 [PH] (ref 5–7)
PLATELET # BLD AUTO: 183 10E3/UL (ref 150–450)
PLATELET # BLD AUTO: 219 10E3/UL (ref 150–450)
PO2 BLDV: 36 MM HG (ref 25–47)
POTASSIUM BLD-SCNC: 2.9 MMOL/L (ref 3.4–5.3)
POTASSIUM BLD-SCNC: 4 MMOL/L (ref 3.4–5.3)
PROT CSF-MCNC: 198 MG/DL (ref 15–60)
PROT SERPL-MCNC: 7.3 G/DL (ref 6.8–8.8)
PROT SERPL-MCNC: 7.3 G/DL (ref 6.8–8.8)
RBC # BLD AUTO: 4.63 10E6/UL (ref 4.4–5.9)
RBC # BLD AUTO: 4.68 10E6/UL (ref 4.4–5.9)
RBC # CSF MANUAL: 10 /UL (ref 0–2)
RBC URINE: 0 /HPF
RSV RNA SPEC NAA+PROBE: NEGATIVE
S PNEUM DNA CSF QL NAA+NON-PROBE: NEGATIVE
SARS-COV-2 RNA RESP QL NAA+PROBE: NEGATIVE
SODIUM SERPL-SCNC: 142 MMOL/L (ref 133–144)
SODIUM SERPL-SCNC: 143 MMOL/L (ref 133–144)
SP GR UR STRIP: 1 (ref 1–1.03)
TUBE # CSF: 1
UROBILINOGEN UR STRIP-MCNC: NORMAL MG/DL
VZV DNA CSF QL NAA+NON-PROBE: NEGATIVE
WBC # BLD AUTO: 10.1 10E3/UL (ref 4–11)
WBC # BLD AUTO: 14 10E3/UL (ref 4–11)
WBC # CSF MANUAL: 797 /UL (ref 0–5)
WBC URINE: <1 /HPF

## 2022-11-01 PROCEDURE — 258N000003 HC RX IP 258 OP 636: Performed by: FAMILY MEDICINE

## 2022-11-01 PROCEDURE — 99285 EMERGENCY DEPT VISIT HI MDM: CPT | Mod: 25

## 2022-11-01 PROCEDURE — C9803 HOPD COVID-19 SPEC COLLECT: HCPCS

## 2022-11-01 PROCEDURE — 82945 GLUCOSE OTHER FLUID: CPT | Performed by: NURSE PRACTITIONER

## 2022-11-01 PROCEDURE — 250N000013 HC RX MED GY IP 250 OP 250 PS 637: Performed by: FAMILY MEDICINE

## 2022-11-01 PROCEDURE — 96375 TX/PRO/DX INJ NEW DRUG ADDON: CPT

## 2022-11-01 PROCEDURE — 250N000011 HC RX IP 250 OP 636: Performed by: FAMILY MEDICINE

## 2022-11-01 PROCEDURE — 86140 C-REACTIVE PROTEIN: CPT | Performed by: NURSE PRACTITIONER

## 2022-11-01 PROCEDURE — 96361 HYDRATE IV INFUSION ADD-ON: CPT

## 2022-11-01 PROCEDURE — 99284 EMERGENCY DEPT VISIT MOD MDM: CPT | Mod: 25

## 2022-11-01 PROCEDURE — 36415 COLL VENOUS BLD VENIPUNCTURE: CPT | Performed by: FAMILY MEDICINE

## 2022-11-01 PROCEDURE — 87529 HSV DNA AMP PROBE: CPT | Performed by: NURSE PRACTITIONER

## 2022-11-01 PROCEDURE — 86617 LYME DISEASE ANTIBODY: CPT | Performed by: NURSE PRACTITIONER

## 2022-11-01 PROCEDURE — 85025 COMPLETE CBC W/AUTO DIFF WBC: CPT | Performed by: NURSE PRACTITIONER

## 2022-11-01 PROCEDURE — 250N000011 HC RX IP 250 OP 636: Performed by: NURSE PRACTITIONER

## 2022-11-01 PROCEDURE — 96368 THER/DIAG CONCURRENT INF: CPT

## 2022-11-01 PROCEDURE — 87205 SMEAR GRAM STAIN: CPT | Performed by: NURSE PRACTITIONER

## 2022-11-01 PROCEDURE — 83735 ASSAY OF MAGNESIUM: CPT | Performed by: INTERNAL MEDICINE

## 2022-11-01 PROCEDURE — 250N000011 HC RX IP 250 OP 636: Performed by: INTERNAL MEDICINE

## 2022-11-01 PROCEDURE — 87040 BLOOD CULTURE FOR BACTERIA: CPT | Performed by: NURSE PRACTITIONER

## 2022-11-01 PROCEDURE — 258N000003 HC RX IP 258 OP 636: Performed by: INTERNAL MEDICINE

## 2022-11-01 PROCEDURE — 86788 WEST NILE VIRUS AB IGM: CPT | Performed by: NURSE PRACTITIONER

## 2022-11-01 PROCEDURE — 82077 ASSAY SPEC XCP UR&BREATH IA: CPT | Performed by: FAMILY MEDICINE

## 2022-11-01 PROCEDURE — 84157 ASSAY OF PROTEIN OTHER: CPT | Performed by: NURSE PRACTITIONER

## 2022-11-01 PROCEDURE — 87637 SARSCOV2&INF A&B&RSV AMP PRB: CPT | Performed by: FAMILY MEDICINE

## 2022-11-01 PROCEDURE — 87651 STREP A DNA AMP PROBE: CPT | Performed by: NURSE PRACTITIONER

## 2022-11-01 PROCEDURE — 89050 BODY FLUID CELL COUNT: CPT | Performed by: NURSE PRACTITIONER

## 2022-11-01 PROCEDURE — 96367 TX/PROPH/DG ADDL SEQ IV INF: CPT

## 2022-11-01 PROCEDURE — 62270 DX LMBR SPI PNXR: CPT | Performed by: EMERGENCY MEDICINE

## 2022-11-01 PROCEDURE — 83690 ASSAY OF LIPASE: CPT | Performed by: FAMILY MEDICINE

## 2022-11-01 PROCEDURE — 62270 DX LMBR SPI PNXR: CPT

## 2022-11-01 PROCEDURE — 81001 URINALYSIS AUTO W/SCOPE: CPT | Performed by: NURSE PRACTITIONER

## 2022-11-01 PROCEDURE — 96365 THER/PROPH/DIAG IV INF INIT: CPT

## 2022-11-01 PROCEDURE — 85025 COMPLETE CBC W/AUTO DIFF WBC: CPT | Performed by: FAMILY MEDICINE

## 2022-11-01 PROCEDURE — 99284 EMERGENCY DEPT VISIT MOD MDM: CPT | Mod: CS | Performed by: FAMILY MEDICINE

## 2022-11-01 PROCEDURE — 99285 EMERGENCY DEPT VISIT HI MDM: CPT | Mod: 25 | Performed by: EMERGENCY MEDICINE

## 2022-11-01 PROCEDURE — 009U3ZX DRAINAGE OF SPINAL CANAL, PERCUTANEOUS APPROACH, DIAGNOSTIC: ICD-10-PCS | Performed by: NURSE ANESTHETIST, CERTIFIED REGISTERED

## 2022-11-01 PROCEDURE — 36415 COLL VENOUS BLD VENIPUNCTURE: CPT | Performed by: NURSE PRACTITIONER

## 2022-11-01 PROCEDURE — 83605 ASSAY OF LACTIC ACID: CPT | Performed by: NURSE PRACTITIONER

## 2022-11-01 PROCEDURE — 99221 1ST HOSP IP/OBS SF/LOW 40: CPT | Mod: AI | Performed by: INTERNAL MEDICINE

## 2022-11-01 PROCEDURE — 87070 CULTURE OTHR SPECIMN AEROBIC: CPT | Performed by: NURSE PRACTITIONER

## 2022-11-01 PROCEDURE — 96374 THER/PROPH/DIAG INJ IV PUSH: CPT

## 2022-11-01 PROCEDURE — 370N000003 HC ANESTHESIA WARD SERVICE

## 2022-11-01 PROCEDURE — 84155 ASSAY OF PROTEIN SERUM: CPT | Performed by: NURSE PRACTITIONER

## 2022-11-01 PROCEDURE — 87483 CNS DNA AMP PROBE TYPE 12-25: CPT | Performed by: NURSE PRACTITIONER

## 2022-11-01 PROCEDURE — 80053 COMPREHEN METABOLIC PANEL: CPT | Performed by: FAMILY MEDICINE

## 2022-11-01 PROCEDURE — 86789 WEST NILE VIRUS ANTIBODY: CPT | Performed by: NURSE PRACTITIONER

## 2022-11-01 PROCEDURE — 120N000001 HC R&B MED SURG/OB

## 2022-11-01 PROCEDURE — 87075 CULTR BACTERIA EXCEPT BLOOD: CPT | Performed by: NURSE PRACTITIONER

## 2022-11-01 PROCEDURE — 86140 C-REACTIVE PROTEIN: CPT | Performed by: FAMILY MEDICINE

## 2022-11-01 PROCEDURE — 258N000003 HC RX IP 258 OP 636: Performed by: NURSE PRACTITIONER

## 2022-11-01 PROCEDURE — 82803 BLOOD GASES ANY COMBINATION: CPT | Performed by: FAMILY MEDICINE

## 2022-11-01 RX ORDER — ENOXAPARIN SODIUM 100 MG/ML
40 INJECTION SUBCUTANEOUS EVERY 24 HOURS
Status: DISCONTINUED | OUTPATIENT
Start: 2022-11-01 | End: 2022-11-07 | Stop reason: HOSPADM

## 2022-11-01 RX ORDER — HYDROMORPHONE HYDROCHLORIDE 1 MG/ML
0.5 INJECTION, SOLUTION INTRAMUSCULAR; INTRAVENOUS; SUBCUTANEOUS EVERY 6 HOURS PRN
Status: DISCONTINUED | OUTPATIENT
Start: 2022-11-01 | End: 2022-11-07 | Stop reason: HOSPADM

## 2022-11-01 RX ORDER — SODIUM CHLORIDE 9 MG/ML
INJECTION, SOLUTION INTRAVENOUS CONTINUOUS
Status: DISCONTINUED | OUTPATIENT
Start: 2022-11-01 | End: 2022-11-03

## 2022-11-01 RX ORDER — IBUPROFEN 200 MG
200 TABLET ORAL EVERY 4 HOURS PRN
Status: ON HOLD | COMMUNITY
End: 2022-11-07

## 2022-11-01 RX ORDER — LIDOCAINE 40 MG/G
CREAM TOPICAL
Status: DISCONTINUED | OUTPATIENT
Start: 2022-11-01 | End: 2022-11-07 | Stop reason: HOSPADM

## 2022-11-01 RX ORDER — SUMATRIPTAN 25 MG/1
TABLET, FILM COATED ORAL
Qty: 8 TABLET | Refills: 0 | Status: ON HOLD | OUTPATIENT
Start: 2022-11-01 | End: 2022-11-07

## 2022-11-01 RX ORDER — DIPHENHYDRAMINE HYDROCHLORIDE 50 MG/ML
25 INJECTION INTRAMUSCULAR; INTRAVENOUS ONCE
Status: COMPLETED | OUTPATIENT
Start: 2022-11-01 | End: 2022-11-01

## 2022-11-01 RX ORDER — POTASSIUM CHLORIDE 1500 MG/1
40 TABLET, EXTENDED RELEASE ORAL ONCE
Status: COMPLETED | OUTPATIENT
Start: 2022-11-01 | End: 2022-11-01

## 2022-11-01 RX ORDER — CEFAZOLIN SODIUM 1 G/50ML
1250 SOLUTION INTRAVENOUS EVERY 12 HOURS
Status: DISCONTINUED | OUTPATIENT
Start: 2022-11-02 | End: 2022-11-02

## 2022-11-01 RX ORDER — OXYCODONE HYDROCHLORIDE 5 MG/1
5 TABLET ORAL EVERY 4 HOURS PRN
Status: DISCONTINUED | OUTPATIENT
Start: 2022-11-01 | End: 2022-11-07 | Stop reason: HOSPADM

## 2022-11-01 RX ORDER — ACETAMINOPHEN 325 MG/1
650 TABLET ORAL EVERY 6 HOURS PRN
Status: DISCONTINUED | OUTPATIENT
Start: 2022-11-01 | End: 2022-11-07 | Stop reason: HOSPADM

## 2022-11-01 RX ORDER — SODIUM CHLORIDE 9 MG/ML
INJECTION, SOLUTION INTRAVENOUS CONTINUOUS
Status: DISCONTINUED | OUTPATIENT
Start: 2022-11-01 | End: 2022-11-01 | Stop reason: HOSPADM

## 2022-11-01 RX ORDER — ACETAMINOPHEN 650 MG/1
650 SUPPOSITORY RECTAL EVERY 6 HOURS PRN
Status: DISCONTINUED | OUTPATIENT
Start: 2022-11-01 | End: 2022-11-07 | Stop reason: HOSPADM

## 2022-11-01 RX ORDER — NALOXONE HYDROCHLORIDE 0.4 MG/ML
0.4 INJECTION, SOLUTION INTRAMUSCULAR; INTRAVENOUS; SUBCUTANEOUS
Status: DISCONTINUED | OUTPATIENT
Start: 2022-11-01 | End: 2022-11-07 | Stop reason: HOSPADM

## 2022-11-01 RX ORDER — ONDANSETRON 2 MG/ML
4 INJECTION INTRAMUSCULAR; INTRAVENOUS EVERY 6 HOURS PRN
Status: DISCONTINUED | OUTPATIENT
Start: 2022-11-01 | End: 2022-11-07 | Stop reason: HOSPADM

## 2022-11-01 RX ORDER — HYDROMORPHONE HYDROCHLORIDE 1 MG/ML
0.5 INJECTION, SOLUTION INTRAMUSCULAR; INTRAVENOUS; SUBCUTANEOUS ONCE
Status: COMPLETED | OUTPATIENT
Start: 2022-11-01 | End: 2022-11-01

## 2022-11-01 RX ORDER — ONDANSETRON 4 MG/1
4 TABLET, ORALLY DISINTEGRATING ORAL EVERY 6 HOURS PRN
Status: DISCONTINUED | OUTPATIENT
Start: 2022-11-01 | End: 2022-11-07 | Stop reason: HOSPADM

## 2022-11-01 RX ORDER — CEFTRIAXONE 1 G/1
1 INJECTION, POWDER, FOR SOLUTION INTRAMUSCULAR; INTRAVENOUS EVERY 24 HOURS
Status: DISCONTINUED | OUTPATIENT
Start: 2022-11-02 | End: 2022-11-02

## 2022-11-01 RX ORDER — KETOROLAC TROMETHAMINE 15 MG/ML
15 INJECTION, SOLUTION INTRAMUSCULAR; INTRAVENOUS ONCE
Status: COMPLETED | OUTPATIENT
Start: 2022-11-01 | End: 2022-11-01

## 2022-11-01 RX ORDER — DEXAMETHASONE SODIUM PHOSPHATE 10 MG/ML
10 INJECTION, SOLUTION INTRAMUSCULAR; INTRAVENOUS ONCE
Status: COMPLETED | OUTPATIENT
Start: 2022-11-01 | End: 2022-11-01

## 2022-11-01 RX ORDER — NALOXONE HYDROCHLORIDE 0.4 MG/ML
0.2 INJECTION, SOLUTION INTRAMUSCULAR; INTRAVENOUS; SUBCUTANEOUS
Status: DISCONTINUED | OUTPATIENT
Start: 2022-11-01 | End: 2022-11-07 | Stop reason: HOSPADM

## 2022-11-01 RX ORDER — SODIUM CHLORIDE 9 MG/ML
INJECTION, SOLUTION INTRAVENOUS CONTINUOUS
Status: DISCONTINUED | OUTPATIENT
Start: 2022-11-01 | End: 2022-11-02

## 2022-11-01 RX ORDER — PROCHLORPERAZINE MALEATE 5 MG
10 TABLET ORAL EVERY 6 HOURS PRN
Status: DISCONTINUED | OUTPATIENT
Start: 2022-11-01 | End: 2022-11-07 | Stop reason: HOSPADM

## 2022-11-01 RX ORDER — MAGNESIUM SULFATE 1 G/100ML
1 INJECTION INTRAVENOUS ONCE
Status: COMPLETED | OUTPATIENT
Start: 2022-11-01 | End: 2022-11-01

## 2022-11-01 RX ORDER — CEFTRIAXONE 2 G/1
2 INJECTION, POWDER, FOR SOLUTION INTRAMUSCULAR; INTRAVENOUS EVERY 24 HOURS
Status: DISCONTINUED | OUTPATIENT
Start: 2022-11-01 | End: 2022-11-01 | Stop reason: DRUGHIGH

## 2022-11-01 RX ORDER — VANCOMYCIN HYDROCHLORIDE 1 G/200ML
1000 INJECTION, SOLUTION INTRAVENOUS EVERY 12 HOURS
Status: DISCONTINUED | OUTPATIENT
Start: 2022-11-02 | End: 2022-11-01 | Stop reason: DRUGHIGH

## 2022-11-01 RX ORDER — PROCHLORPERAZINE 25 MG
25 SUPPOSITORY, RECTAL RECTAL EVERY 12 HOURS PRN
Status: DISCONTINUED | OUTPATIENT
Start: 2022-11-01 | End: 2022-11-07 | Stop reason: HOSPADM

## 2022-11-01 RX ORDER — CEFAZOLIN SODIUM 1 G/50ML
2000 SOLUTION INTRAVENOUS ONCE
Status: COMPLETED | OUTPATIENT
Start: 2022-11-01 | End: 2022-11-01

## 2022-11-01 RX ADMIN — KETOROLAC TROMETHAMINE 15 MG: 15 INJECTION, SOLUTION INTRAMUSCULAR; INTRAVENOUS at 12:44

## 2022-11-01 RX ADMIN — POTASSIUM CHLORIDE 40 MEQ: 1500 TABLET, EXTENDED RELEASE ORAL at 05:19

## 2022-11-01 RX ADMIN — SODIUM CHLORIDE 1000 ML: 9 INJECTION, SOLUTION INTRAVENOUS at 12:35

## 2022-11-01 RX ADMIN — DEXAMETHASONE SODIUM PHOSPHATE 10 MG: 10 INJECTION, SOLUTION INTRAMUSCULAR; INTRAVENOUS at 12:46

## 2022-11-01 RX ADMIN — SODIUM CHLORIDE 1000 ML: 9 INJECTION, SOLUTION INTRAVENOUS at 03:15

## 2022-11-01 RX ADMIN — SODIUM CHLORIDE: 9 INJECTION, SOLUTION INTRAVENOUS at 21:31

## 2022-11-01 RX ADMIN — PROCHLORPERAZINE EDISYLATE 7.5 MG: 5 INJECTION INTRAMUSCULAR; INTRAVENOUS at 03:16

## 2022-11-01 RX ADMIN — DIPHENHYDRAMINE HYDROCHLORIDE 25 MG: 50 INJECTION, SOLUTION INTRAMUSCULAR; INTRAVENOUS at 12:40

## 2022-11-01 RX ADMIN — VANCOMYCIN HYDROCHLORIDE 2000 MG: 1 INJECTION, POWDER, LYOPHILIZED, FOR SOLUTION INTRAVENOUS at 18:45

## 2022-11-01 RX ADMIN — MAGNESIUM SULFATE HEPTAHYDRATE 1 G: 1 INJECTION, SOLUTION INTRAVENOUS at 12:51

## 2022-11-01 RX ADMIN — PROCHLORPERAZINE EDISYLATE 10 MG: 5 INJECTION INTRAMUSCULAR; INTRAVENOUS at 12:47

## 2022-11-01 RX ADMIN — DIPHENHYDRAMINE HYDROCHLORIDE 25 MG: 50 INJECTION, SOLUTION INTRAMUSCULAR; INTRAVENOUS at 03:16

## 2022-11-01 RX ADMIN — ACYCLOVIR SODIUM 800 MG: 50 INJECTION, SOLUTION INTRAVENOUS at 17:35

## 2022-11-01 RX ADMIN — SODIUM CHLORIDE: 9 INJECTION, SOLUTION INTRAVENOUS at 17:07

## 2022-11-01 RX ADMIN — HYDROMORPHONE HYDROCHLORIDE 0.5 MG: 1 INJECTION, SOLUTION INTRAMUSCULAR; INTRAVENOUS; SUBCUTANEOUS at 21:30

## 2022-11-01 RX ADMIN — HYDROMORPHONE HYDROCHLORIDE 0.5 MG: 1 INJECTION, SOLUTION INTRAMUSCULAR; INTRAVENOUS; SUBCUTANEOUS at 17:33

## 2022-11-01 RX ADMIN — SODIUM CHLORIDE 1000 ML: 9 INJECTION, SOLUTION INTRAVENOUS at 05:39

## 2022-11-01 RX ADMIN — CEFTRIAXONE SODIUM 2 G: 2 INJECTION, POWDER, FOR SOLUTION INTRAMUSCULAR; INTRAVENOUS at 17:08

## 2022-11-01 ASSESSMENT — ENCOUNTER SYMPTOMS
ABDOMINAL PAIN: 0
DIARRHEA: 0
NAUSEA: 1
NECK PAIN: 0
MUSCULOSKELETAL NEGATIVE: 1
CHILLS: 1
FEVER: 0
VOMITING: 1
SHORTNESS OF BREATH: 0
COUGH: 1
NAUSEA: 1
HEADACHES: 1
FATIGUE: 1
VOMITING: 1
MUSCULOSKELETAL NEGATIVE: 1
HEMATOLOGIC/LYMPHATIC NEGATIVE: 1
FATIGUE: 0
COUGH: 1
HEADACHES: 1
CONSTIPATION: 0
EYES NEGATIVE: 1
CARDIOVASCULAR NEGATIVE: 1
CHILLS: 1
PSYCHIATRIC NEGATIVE: 1
NECK STIFFNESS: 0

## 2022-11-01 ASSESSMENT — ACTIVITIES OF DAILY LIVING (ADL)
ADLS_ACUITY_SCORE: 35
ADLS_ACUITY_SCORE: 18
ADLS_ACUITY_SCORE: 35

## 2022-11-01 NOTE — ED NOTES
Writer contacted  for rounding. Pt complains of a HA and feeling tired. Pt requested a blanket and lights to be dimmed. No other requests at this time.

## 2022-11-01 NOTE — ED PROVIDER NOTES
History     Chief Complaint   Patient presents with     Headache     Back Pain     HPI  David Huang is a 32 year old male who presented emergency room with his 2 friends secondary to concerns of headache that started this morning with nausea and vomiting symptoms.  Patient states he also has a cough that been present for 2 days.  He had similar symptoms earlier this year and was hospitalized.  Patient is actively vomiting during the exam.  Patient speaks Frisian but refused .  His 2 friends were able to interpret for him.  Denies any recent injury.  Denies any exposure to chemicals or other illnesses.  Patient works as a landscape labor and has been out in the warm weather in the last several days and has been sweating a lot.     I reviewed his CT scan from May of this year and With the result below:  Narrative & Impression   EXAM: CT HEAD W/O CONTRAST  LOCATION: [facility]  DATE/TIME: 5/19/2022 6:41 PM     INDICATION: Severe headache  COMPARISON: None.  TECHNIQUE: Routine CT Head without IV contrast. Multiplanar reformats. Dose reduction techniques were used.     FINDINGS:  INTRACRANIAL CONTENTS: No intracranial hemorrhage, extraaxial collection, or mass effect.  No CT evidence of acute infarct. Normal parenchymal attenuation. Normal ventricles and sulci.      VISUALIZED ORBITS/SINUSES/MASTOIDS: No intraorbital abnormality. No paranasal sinus mucosal disease. No middle ear or mastoid effusion.     BONES/SOFT TISSUES: No acute abnormality.                                                                      IMPRESSION:  1.  No acute intracranial process.           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    Family History:    No family history on file.    Social History:  Marital Status:  Single [1]        Medications:    SUMAtriptan (IMITREX) 25 MG tablet  Acetaminophen (TYLENOL PO)  NAPROXEN PO  ondansetron (ZOFRAN ODT) 4 MG ODT tab          Review of Systems   Constitutional: Positive for chills and fatigue.   HENT: Positive for congestion.    Eyes: Negative.    Respiratory: Positive for cough. Negative for shortness of breath.    Cardiovascular: Negative.    Gastrointestinal: Positive for nausea and vomiting.   Genitourinary: Negative.    Musculoskeletal: Negative.  Negative for neck pain and neck stiffness.   Skin: Negative.  Negative for rash.   Neurological: Positive for headaches.   Hematological: Negative.    Psychiatric/Behavioral: Negative.    All other systems reviewed and are negative.      Physical Exam   BP: 110/60  Pulse: (!) 122  Temp: 99.3  F (37.4  C)  Resp: 22  SpO2: 98 %      Physical Exam  Vitals and nursing note reviewed. Exam conducted with a chaperone present (Two friends).   Constitutional:       General: He is in acute distress (Patient is actively vomiting during exam.).   HENT:      Head: Normocephalic and atraumatic.      Nose: Congestion present.   Eyes:      General: No scleral icterus.     Extraocular Movements: Extraocular movements intact.      Conjunctiva/sclera: Conjunctivae normal.      Pupils: Pupils are equal, round, and reactive to light.   Cardiovascular:      Rate and Rhythm: Tachycardia present.      Pulses: Normal pulses.   Pulmonary:      Effort: Pulmonary effort is normal. No respiratory distress.    Abdominal:      Palpations: Abdomen is soft.      Tenderness: There is no guarding or rebound.   Musculoskeletal:      Cervical back: Normal range of motion and neck supple.   Skin:     Capillary Refill: Capillary refill takes less than 2 seconds.      Findings: No rash.   Neurological:      Mental Status: He is alert and oriented to person, place, and time.         ED Course              ED Course as of 11/01/22 0642   Tue Nov 01, 2022   0347 Patient reexamined and states that he is feeling markedly improved.  His headache is now resolving.  He has had no additional nausea or vomiting symptoms since the Compazine was given.  We will continue to monitor.   0446 Patient reexamined and states he is still feeling much better.  He is interested in getting something to drink.  He prefers some juice.     Procedures              Critical Care time:  none               Results for orders placed or performed during the hospital encounter of 11/01/22 (from the past 24 hour(s))   CRP inflammation   Result Value Ref Range    CRP Inflammation 6.1 0.0 - 8.0 mg/L   Comprehensive metabolic panel   Result Value Ref Range    Sodium 142 133 - 144 mmol/L    Potassium 2.9 (L) 3.4 - 5.3 mmol/L    Chloride 110 (H) 94 - 109 mmol/L    Carbon Dioxide (CO2) 24 20 - 32 mmol/L    Anion Gap 8 3 - 14 mmol/L    Urea Nitrogen 19 7 - 30 mg/dL    Creatinine 0.86 0.66 - 1.25 mg/dL    Calcium 8.5 8.5 - 10.1 mg/dL    Glucose 147 (H) 70 - 99 mg/dL    Alkaline Phosphatase 74 40 - 150 U/L    AST 23 0 - 45 U/L    ALT 20 0 - 70 U/L    Protein Total 7.3 6.8 - 8.8 g/dL    Albumin 4.3 3.4 - 5.0 g/dL    Bilirubin Total 0.4 0.2 - 1.3 mg/dL    GFR Estimate >90 >60 mL/min/1.73m2   Lipase   Result Value Ref Range    Lipase 119 73 - 393 U/L   Blood gas venous   Result Value Ref Range    pH Venous 7.51 (H) 7.32 - 7.43    pCO2 Venous 32 (L) 40 - 50 mm Hg    pO2 Venous 36 25 - 47 mm Hg    Bicarbonate Venous 25 21 - 28 mmol/L    Base Excess/Deficit (+/-) 2.8 (H) -7.7 -  1.9 mmol/L    FIO2 21    Ethyl Alcohol Level   Result Value Ref Range    Alcohol ethyl 0.01 <=0.01 g/dL   Symptomatic; Yes; 10/30/2022 Influenza A/B & SARS-CoV2 (COVID-19) Virus PCR Multiplex Nasopharyngeal    Specimen: Nasopharyngeal; Swab   Result Value Ref Range    Influenza A PCR Negative Negative    Influenza B PCR Negative Negative    RSV PCR Negative Negative    SARS CoV2 PCR Negative Negative    Narrative    Testing was performed using the Xpert Xpress CoV2/Flu/RSV Assay on the Cepheid GeneXpert Instrument. This test should be ordered for the detection of SARS-CoV-2 and influenza viruses in individuals who meet clinical and/or epidemiological criteria. Test performance is unknown in asymptomatic patients. This test is for in vitro diagnostic use under the FDA EUA for laboratories certified under CLIA to perform high or moderate complexity testing. This test has not been FDA cleared or approved. A negative result does not rule out the presence of PCR inhibitors in the specimen or target RNA in concentration below the limit of detection for the assay. If only one viral target is positive but coinfection with multiple targets is suspected, the sample should be re-tested with another FDA cleared, approved, or authorized test, if coinfection would change clinical management. This test was validated by the [facility]. These laboratories are certified under the Clinical Laboratory Improvement Amendments of 1988 (CLIA-88) as qualified to perform high complexity laboratory testing.   [place] Draw    Narrative    The following orders were created for panel order [place] [facility].  Procedure                               Abnormality         Status                     ---------                               -----------         ------                     Extra Purple Top Tube[[id number]]                            Final result                 Please view results for these tests on the individual orders.    Extra Purple Top Tube   Result Value Ref Range    Hold Specimen [facility]    CBC with platelets differential    Narrative    The following orders were created for panel order CBC with platelets differential.  Procedure                               Abnormality         Status                     ---------                               -----------         ------                     CBC with platelets and d...[[id number]]  Abnormal            Final result                 Please view results for these tests on the individual orders.   CBC with platelets and differential   Result Value Ref Range    WBC Count 10.1 4.0 - 11.0 10e3/uL    RBC Count 4.63 4.40 - 5.90 10e6/uL    Hemoglobin 14.3 13.3 - 17.7 g/dL    Hematocrit 40.4 40.0 - 53.0 %    MCV 87 78 - 100 fL    MCH 30.9 26.5 - 33.0 pg    MCHC 35.4 31.5 - 36.5 g/dL    RDW 12.2 10.0 - 15.0 %    Platelet Count 219 150 - 450 10e3/uL    % Neutrophils 94 %    % Lymphocytes 4 %    % Monocytes 2 %    % Eosinophils 0 %    % Basophils 0 %    % Immature Granulocytes 0 %    NRBCs per 100 WBC 0 <1 /100    Absolute Neutrophils 9.4 (H) 1.6 - 8.3 10e3/uL    Absolute Lymphocytes 0.4 (L) 0.8 - 5.3 10e3/uL    Absolute Monocytes 0.2 0.0 - 1.3 10e3/uL    Absolute Eosinophils 0.0 0.0 - 0.7 10e3/uL    Absolute Basophils 0.0 0.0 - 0.2 10e3/uL    Absolute Immature Granulocytes 0.0 <=0.4 10e3/uL    Absolute NRBCs 0.0 10e3/uL       Medications   0.9% sodium chloride BOLUS (1,000 mLs Intravenous New Bag 11/1/22 0315)     Followed by   sodium chloride 0.9% infusion (has no administration in time range)   prochlorperazine (COMPAZINE) injection 7.5 mg (7.5 mg Intravenous Given 11/1/22 0316)   diphenhydrAMINE (BENADRYL) injection 25 mg (25 mg Intravenous Given 11/1/22 0316)   potassium chloride ER (KLOR-CON M) CR tablet 40 mEq (40 mEq Oral Given 11/1/22 0519)   0.9% sodium chloride BOLUS (1,000 mLs Intravenous New Bag 11/1/22 0539)       Assessments & Plan (with Medical Decision Making)  42-year-old male to  the ER with his friends secondary to concerns of severe headache with nausea and vomiting.  Patient is a  and has been working outside in the warm weather recently with increased sweating.  Patient was given IV Compazine to help both the nausea and vomiting as well as potential migraine headache with marked improvement in his symptoms.  He was sedated from the medication but had resolution of both his vomiting/nausea and  his headache.  Patient was found to be hypokalemic likely secondary to the vomiting.  He was given oral potassium replacement and tolerated it well.  Patient was significantly dehydrated.  He was given 2 L of IV fluids and still had not urinated.  He desired return to home feeling improved.  I did offer him a prescription for Imitrex to use should he develop similar headache in the future.  Also encouraged to increase fluid intake and was also given instructions on a high potassium diet.  I gave him a note for his employer to remain off work for the next 2 to 3 days in order to rehydrate.  He was given handouts with discussion on signs and symptoms of concern and when to return to the ER if noted.  He was discharged to the care of his friend.     I have reviewed the nursing notes.    I have reviewed the findings, diagnosis, plan and need for follow up with the patient.       New Prescriptions    SUMATRIPTAN (IMITREX) 25 MG TABLET    Take 25-100mg one time. May repeat 25mg every two hours up to a maximum of 200mg in 24 hours.            I verbally discussed the findings of the evaluation today in the ER. I have verbally discussed with David the suggested treatment(s) as described in the discharge instructions and handouts. I have prescribed the above listed medications and instructed him on appropriate use of these medications.      I have verbally suggested he follow-up in his clinic or return to the ER for increased symptoms. See the follow-up recommendations documented  in the  after visit summary in this visit's EPIC chart.      Disclaimer: This note consists of words and symbols derived from keyboarding and dictation using voice recognition software.  As a result, there may be errors that have gone undetected.  Please consider this when interpreting information found in this note.    Final diagnoses:   Dehydration   Migraine without status migrainosus, not intractable, unspecified migraine type   Nausea and vomiting, unspecified vomiting type   Hypokalemia       11/1/2022   [facility] EMERGENCY DEPT     Giovani Ramírez,   11/01/22 0649

## 2022-11-01 NOTE — ED TRIAGE NOTES
Triage Assessment     Row Name 11/01/22 0234       Triage Assessment (Adult)    Airway WDL WDL       Respiratory WDL    Respiratory WDL WDL            Came in for headache and fever.  Has taken advil and theraflu

## 2022-11-01 NOTE — ANESTHESIA PROCEDURE NOTES
"Lumbar puncture Procedure Note    Pre-Procedure   Staff -        Resident/Fellow: Damaris Austin       CRNA: Leny Merritt APRN CRNA       Performed By: CRNA and SRNA       Location: ED       Pre-Anesthestic Checklist: patient identified, IV checked, risks and benefits discussed, informed consent, monitors and equipment checked, pre-op evaluation, at physician/surgeon's request and post-op pain management  Timeout:       Correct Patient: Yes        Correct Procedure: Yes        Correct Site: Yes        Correct Position: Yes   Procedure Documentation  Procedure: lumbar puncture       Patient Prep/Sterile Barriers: sterile gloves, mask, patient draped       Skin prep: Betadine       Insertion Site: L3-4. (midline approach).       Needle Gauge: 25.        Needle Length (Inches): 3.5        Spinal Needle Type: Al tip       Introducer used       # of attempts: 1 and  # of redirects:     Assessment/Narrative         Paresthesias: No.       LP fluid removal amount (ml): 5       CSF fluid: clear.       Opening pressure was 25 cmH2O while  Lateral.       Comments:  Patient tolerated the above procedure well without difficulty.  Procedure done per Damaris STEPHENSON under the direct supervision of Leny Merritt CRNA      FOR [facility] ([facility] ONLY:   Pain Team Contact information: please page the Pain Team Via [employer]. Search \"Pain\". During daytime hours, please page the attending first. At night please page the resident first.    "

## 2022-11-01 NOTE — ED NOTES
Writer assisted anesthesia with sitting patient up and laying patient down for LP. Writer left room once patient was laying down.

## 2022-11-01 NOTE — ANESTHESIA POSTPROCEDURE EVALUATION
Patient: David Huang    Procedure: * No procedures listed *       Anesthesia Type:  No value filed.    Note:  Disposition: Outpatient   Postop Pain Control: Uneventful            Sign Out: Well controlled pain   PONV: No   Neuro/Psych: Uneventful            Sign Out: Acceptable/Baseline neuro status   Airway/Respiratory: Uneventful            Sign Out: Acceptable/Baseline resp. status   CV/Hemodynamics: Uneventful            Sign Out: Acceptable CV status   Other NRE: NONE   DID A NON-ROUTINE EVENT OCCUR? No    Event details/Postop Comments:  Pt was happy with anesthesia care.  No complications.  I will follow up with the pt if needed.           Last vitals:  Vitals:    11/01/22 1445 11/01/22 1500 11/01/22 1515   BP: 118/69 110/68 108/66   Pulse: 96 93 90   Resp:      Temp:      SpO2:          Electronically Signed By: HO Lentz CRNA  November 1, 2022  4:26 PM

## 2022-11-01 NOTE — ED TRIAGE NOTES
Patient with headache and dizziness. Was here yesterday with same symptoms, sent home with Imitrex which he has not picked up. Belgian speaking only, has  here, offered ours but declined.      Triage Assessment     Row Name 11/01/22 1153       Triage Assessment (Adult)    Airway WDL WDL       Respiratory WDL    Respiratory WDL WDL       Skin Circulation/Temperature WDL    Skin Circulation/Temperature WDL WDL       Cardiac WDL    Cardiac WDL WDL

## 2022-11-01 NOTE — ANESTHESIA PREPROCEDURE EVALUATION
Anesthesia Pre-Procedure Evaluation    Patient: David Huang   MRN: [id number] : 1990        Procedure : * No procedures listed *          Past Medical History:   Diagnosis Date     Heart murmur       History reviewed. No pertinent surgical history.   No Known Allergies   Social History     Tobacco Use     Smoking status: Never     Smokeless tobacco: Never   Substance Use Topics     Alcohol use: Yes      Wt Readings from Last 1 Encounters:   22 77.1 kg (170 lb)        Anesthesia Evaluation   Pt has had prior anesthetic. Type: MAC.        ROS/MED HX  ENT/Pulmonary:  - neg pulmonary ROS     Neurologic: Comment: Patient lethargic, sleepy, but arousable      Cardiovascular:  - neg cardiovascular ROS   (+) -----valvular problems/murmurs     METS/Exercise Tolerance:     Hematologic:  - neg hematologic  ROS     Musculoskeletal:  - neg musculoskeletal ROS     GI/Hepatic:  - neg GI/hepatic ROS     Renal/Genitourinary:  - neg Renal ROS     Endo:  - neg endo ROS     Psychiatric/Substance Use:  - neg psychiatric ROS     Infectious Disease: Comment: This visit 2022 patient presents with HA, neck pain elevated WBC's and fever.       Malignancy:  - neg malignancy ROS     Other:  - neg other ROS          Physical Exam    Airway  airway exam normal      Mallampati: II   TM distance: > 3 FB   Neck ROM: full   Mouth opening: > 3 cm    Respiratory Devices and Support         Dental  no notable dental history         Cardiovascular   cardiovascular exam normal       Rhythm and rate: regular and normal     Pulmonary   pulmonary exam normal        breath sounds clear to auscultation           OUTSIDE LABS:  CBC:   Lab Results   Component Value Date    WBC 14.0 (H) 2022    WBC 10.1 2022    HGB 14.4 2022    HGB 14.3 2022    HCT 41.4 2022    HCT 40.4 2022     2022     2022     BMP:   Lab Results   Component Value Date     2022      11/01/2022    POTASSIUM 4.0 11/01/2022    POTASSIUM 2.9 (L) 11/01/2022    CHLORIDE 112 (H) 11/01/2022    CHLORIDE 110 (H) 11/01/2022    CO2 25 11/01/2022    CO2 24 11/01/2022    BUN 12 11/01/2022    BUN 19 11/01/2022    CR 0.97 11/01/2022    CR 0.86 11/01/2022     (H) 11/01/2022     (H) 11/01/2022     COAGS: No results found for: PTT, INR, FIBR  POC: No results found for: BGM, HCG, HCGS  HEPATIC:   Lab Results   Component Value Date    ALBUMIN 3.8 11/01/2022    PROTTOTAL 7.3 11/01/2022    ALT 23 11/01/2022    AST 22 11/01/2022    ALKPHOS 72 11/01/2022    BILITOTAL 0.3 11/01/2022     OTHER:   Lab Results   Component Value Date    LACT 1.0 11/01/2022    A1C 5.5 05/19/2022    LAKHWINDER 8.5 11/01/2022    MAG 1.9 05/23/2022    LIPASE 119 11/01/2022    CRP 50.4 (H) 11/01/2022       Anesthesia Plan    ASA Status:  2, emergent    - Procedure: Procedure only, no anesthetic delivered                    Consents    Anesthesia Plan(s) and associated risks, benefits, and realistic alternatives discussed. Questions answered and patient/representative(s) expressed understanding.    - Discussed:     - Discussed with:  Patient,       - Extended Intubation/Ventilatory Support Discussed: No.      - Patient is DNR/DNI Status: No    Use of blood products discussed: No .     Postoperative Care            Comments:    Other Comments: The risks and benefits of anesthesia, and the alternatives where applicable, have been discussed with the patient, and they wish to proceed.            HO Lentz CRNA

## 2022-11-01 NOTE — PHARMACY-VANCOMYCIN DOSING SERVICE
Pharmacy Vancomycin Initial Note  Date of Service 2022  Patient's  1990  32 year old, male    Indication: Meningitis    Current estimated CrCl = Estimated Creatinine Clearance: 121.4 mL/min (based on SCr of 0.97 mg/dL).    Creatinine for last 3 days  2022:  3:05 AM Creatinine 0.86 mg/dL; 12:29 PM Creatinine 0.97 mg/dL    Recent Vancomycin Level(s) for last 3 days  No results found for requested labs within last 72 hours.      Vancomycin IV Administrations (past 72 hours)      No vancomycin orders with administrations in past 72 hours.                Nephrotoxins and other renal medications (From now, onward)    Start     Dose/Rate Route Frequency Ordered Stop    22 0200  vancomycin (VANCOCIN) 1,250 mg in sodium chloride 0.9 % 250 mL intermittent infusion         1,250 mg  over 90 Minutes Intravenous EVERY 12 HOURS 22 1659      22 1800  vancomycin (VANCOCIN) 2,000 mg in sodium chloride 0.9 % 500 mL intermittent infusion         2,000 mg  over 2 Hours Intravenous ONCE 22 1659      22 1730  acyclovir (ZOVIRAX) 800 mg in sodium chloride 0.9 % 266 mL intermittent infusion         10 mg/kg × 77.1 kg  266 mL/hr over 1 Hours Intravenous EVERY 8 HOURS 22 1647            Contrast Orders - past 72 hours (72h ago, onward)    None          [employer] Prediction of Planned Initial Vancomycin Regimen          Loading dose: 2000 mg at 18:00 2022.  Regimen: 1250 mg IV every 12 hours.  Start time: 16:55 on 2022  Exposure target: AUC24 (range)400-600 mg/L.hr   AUC24,ss: 549 mg/L.hr  Probability of AUC24 > 400: 80 %  Ctrough,ss: 16.6 mg/L  Probability of Ctrough,ss > 20: 36 %  Probability of nephrotoxicity ([place] GM ): 12 %       Plan:  1. Start vancomycin  2000mg iv x1 then 1250 mg IV q12h.   2. Vancomycin monitoring method: AUC  3. Vancomycin therapeutic monitoring goal: 400-600 mg*h/L  4. Pharmacy will check vancomycin levels as appropriate in 1-3 Days.     5. Serum creatinine levels will be ordered daily for the first week of therapy and at least twice weekly for subsequent weeks.      Ramos Del Castillo RPH

## 2022-11-01 NOTE — ED PROVIDER NOTES
History     Chief Complaint   Patient presents with     Headache     HPI   Most of history provided by his roommate, employer, and patient at the bedside- patient refused  initially:  David Huang is a 32 year old male who is accompanied by his roommate/friend and his employers wife for evaluation of chills, headache, neck pain, nausea, and vomiting that started yesterday morning. He has had a mild cough for 2 days, denies shortness of breath or chest pain. Reports headache is at the base of his neck and radiates throughout his head on both sides. Very light sensitive. He was seen here earlier today at 2:30am with extensive work-up. He had a negative head CT.  No leukocytosis. Normal CRP.  Potassium was low at 2.9, likely from vomiting.  Remainder of metabolic panel was negative.  Lipase was normal.  Negative EtOH level.  Negative Covid-19 and Influenza testing.     Patient was given IV normal saline 2 L bolus, IV Benadryl and IV Compazine.  His symptoms improved.  He was tolerating oral intake and was able to take p.o. potassium. It was thought that his symptoms were possibly due to migraine headache. Since he was afebrile, no leukocytosis and improved here in the ED there was no indication for LP at the time. He was given prescription for Imitrex to start if he has return of similar headache.  He has not yet picked up the medication and his symptoms worsened over the last couple hours.  Unfortunately, symptoms worsened again a couple hours ago with chills, nausea, headache and neck stiffness.    Prior history of similar presentation in May when he was admitted here for bacterial meningitis in May of this year, cultures were negative.      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 No pertinent surgical history.    Family History:    History reviewed. No pertinent family history.    Social History:  Marital Status:  Single [1]  Social History     Tobacco Use     Smoking status: Never     Smokeless tobacco: Never   Substance Use Topics     Alcohol use: Yes     Drug use: Never        Medications:    ibuprofen (ADVIL/MOTRIN) 200 MG tablet  Acetaminophen (TYLENOL PO)  NAPROXEN PO  ondansetron (ZOFRAN ODT) 4 MG ODT tab  SUMAtriptan (IMITREX) 25 MG tablet          Review of Systems   Constitutional: Positive for chills. Negative for fatigue and fever.   HENT: Negative for congestion.    Respiratory: Positive for cough.    Gastrointestinal: Positive for nausea and vomiting. Negative for abdominal pain, constipation and diarrhea.   Genitourinary: Negative.    Musculoskeletal: Negative.    Neurological: Positive for headaches.   All other systems reviewed and are negative.      Physical Exam   BP: 111/65  Pulse: 102  Temp: 98.5  F (36.9  C)  Resp: 18  Weight: 77.1 kg (170 lb)  SpO2: 95 %      Physical Exam  Constitutional:       General: He is in acute distress (appears uncomfortable. eyes closed.).      Appearance: He is ill-appearing.   HENT:      Head: Normocephalic and atraumatic.      Right Ear: Tympanic membrane and external ear normal.      Left Ear: External ear normal.      Nose: Nose normal.      Mouth/Throat:      Mouth: Mucous membranes are moist.   Eyes:      Conjunctiva/sclera: Conjunctivae normal.   Cardiovascular:      Rate and Rhythm: Regular rhythm. Tachycardia present.   Pulmonary:      Effort:  Pulmonary effort is normal. No respiratory distress.      Breath sounds: Normal breath sounds.   Musculoskeletal:      Cervical back: No edema, erythema, rigidity or torticollis. Pain with movement, spinous process tenderness and muscular tenderness present. Decreased range of motion (some difficulty with chin to chest movement, and reports increased pain at back of neck with this movement.).   Skin:     General: Skin is warm and dry.      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   Neurological:      General: No focal deficit present.      Mental Status: He is oriented to person, place, and time.         ED Course                 Procedures              Results for orders placed or performed during the hospital encounter of 11/01/22 (from the past 24 hour(s))   CBC with platelets differential    Narrative    The following orders were created for panel order CBC with platelets differential.  Procedure                               Abnormality         Status                     ---------                               -----------         ------                     CBC with platelets and d...[[id number]]  Abnormal            Final result                 Please view results for these tests on the individual orders.   Comprehensive metabolic panel   Result Value Ref Range    Sodium 143 133 - 144 mmol/L    Potassium 4.0 3.4 - 5.3 mmol/L    Chloride 112 (H) 94 - 109 mmol/L    Carbon Dioxide (CO2) 25 20 - 32 mmol/L    Anion Gap 6 3 - 14 mmol/L    Urea Nitrogen 12 7 - 30 mg/dL    Creatinine 0.97 0.66 - 1.25 mg/dL    Calcium 8.5 8.5 - 10.1 mg/dL    Glucose 148 (H) 70 - 99 mg/dL    Alkaline Phosphatase 72 40 - 150 U/L    AST 22 0 - 45 U/L    ALT 23 0 - 70 U/L    Protein Total 7.3 6.8 - 8.8 g/dL    Albumin 3.8 3.4 - 5.0 g/dL    Bilirubin Total 0.3 0.2 - 1.3 mg/dL    GFR Estimate >90 >60 mL/min/1.73m2   CBC with platelets and differential   Result Value Ref Range    WBC Count 14.0 (H) 4.0 - 11.0 10e3/uL    RBC  Count 4.68 4.40 - 5.90 10e6/uL    Hemoglobin 14.4 13.3 - 17.7 g/dL    Hematocrit 41.4 40.0 - 53.0 %    MCV 89 78 - 100 fL    MCH 30.8 26.5 - 33.0 pg    MCHC 34.8 31.5 - 36.5 g/dL    RDW 12.5 10.0 - 15.0 %    Platelet Count 183 150 - 450 10e3/uL    % Neutrophils 93 %    % Lymphocytes 3 %    % Monocytes 4 %    % Eosinophils 0 %    % Basophils 0 %    % Immature Granulocytes 0 %    NRBCs per 100 WBC 0 <1 /100    Absolute Neutrophils 13.0 (H) 1.6 - 8.3 10e3/uL    Absolute Lymphocytes 0.4 (L) 0.8 - 5.3 10e3/uL    Absolute Monocytes 0.6 0.0 - 1.3 10e3/uL    Absolute Eosinophils 0.0 0.0 - 0.7 10e3/uL    Absolute Basophils 0.0 0.0 - 0.2 10e3/uL    Absolute Immature Granulocytes 0.0 <=0.4 10e3/uL    Absolute NRBCs 0.0 10e3/uL   CRP inflammation   Result Value Ref Range    CRP Inflammation 50.4 (H) 0.0 - 8.0 mg/L   UA with Microscopic reflex to Culture    Specimen: Urine, Midstream   Result Value Ref Range    Color Urine Straw Colorless, Straw, Light Yellow, Yellow    Appearance Urine Clear Clear    Glucose Urine Negative Negative mg/dL    Bilirubin Urine Negative Negative    Ketones Urine Negative Negative mg/dL    Specific Gravity Urine 1.005 1.003 - 1.035    Blood Urine Negative Negative    pH Urine 6.0 5.0 - 7.0    Protein Albumin Urine Negative Negative mg/dL    Urobilinogen Urine Normal Normal, 2.0 mg/dL    Nitrite Urine Negative Negative    Leukocyte Esterase Urine Negative Negative    RBC Urine 0 <=2 /HPF    WBC Urine <1 <=5 /HPF    Narrative    Urine Culture not indicated   Lactic acid whole blood   Result Value Ref Range    Lactic Acid 1.0 0.7 - 2.0 mmol/L   Streptococcus A Rapid Screen w/Reflex to PCR    Specimen: Throat; Swab   Result Value Ref Range    Group A Strep antigen Negative Negative   Glucose CSF:   Result Value Ref Range    Glucose CSF 68 40 - 70 mg/dL    Narrative    CSF glucose concentrations are about 60 percent of normal plasma glucose.  CSF glucose concentrations are about 60 percent of normal  plasma glucose.   Protein total CSF:   Result Value Ref Range    Protein total  (H) 15 - 60 mg/dL    Narrative    This is a lab developed test. It has not been cleared or approved by the FDA.   CSF Cell Count with Differential:    Narrative    The following orders were created for panel order CSF Cell Count with Differential:.  Procedure                               Abnormality         Status                     ---------                               -----------         ------                     Cell Count CSF[[id number]]               Abnormal            Final result               Differential CSF[[id number]]                                 Final result                 Please view results for these tests on the individual orders.   Cell Count CSF   Result Value Ref Range    Tube Number 1     Color Colorless Colorless    Clarity Hazy (A) Clear    Total Nucleated Cells 797 (H) 0 - 5 /uL    RBC Count 10 (H) 0 - 2 /uL   Differential CSF   Result Value Ref Range    % Neutrophils 84 %    % Lymphocytes 1 %    % Monocytes/Macrophages 15 %    Narrative    No reference ranges have been established. This result should be interpreted in the context of the patient's clinical condition and compared to simultaneous measurement in the patient's blood.       Medications   0.9% sodium chloride BOLUS (0 mLs Intravenous Stopped 11/1/22 1333)     Followed by   sodium chloride 0.9% infusion ( Intravenous New Bag 11/1/22 1707)   cefTRIAXone (ROCEPHIN) 2 g vial to attach to  ml bag for ADULTS or NS 50 ml bag for PEDS (2 g Intravenous New Bag 11/1/22 1708)   acyclovir (ZOVIRAX) 800 mg in sodium chloride 0.9 % 266 mL intermittent infusion (800 mg Intravenous New Bag 11/1/22 1735)   vancomycin (VANCOCIN) 2,000 mg in sodium chloride 0.9 % 500 mL intermittent infusion (2,000 mg Intravenous New Bag 11/1/22 1845)   vancomycin (VANCOCIN) 1,250 mg in sodium chloride 0.9 % 250 mL intermittent infusion (has no administration in time  range)   diphenhydrAMINE (BENADRYL) injection 25 mg (25 mg Intravenous Given 11/1/22 1240)   prochlorperazine (COMPAZINE) injection 10 mg (10 mg Intravenous Given 11/1/22 1247)   ketorolac (TORADOL) injection 15 mg (15 mg Intravenous Given 11/1/22 1244)   dexamethasone PF (DECADRON) injection 10 mg (10 mg Intravenous Given 11/1/22 1246)   magnesium sulfate 1 gm in 100mL D5W intermittent infusion (0 g Intravenous Stopped 11/1/22 1333)   HYDROmorphone (PF) (DILAUDID) injection 0.5 mg (0.5 mg Intravenous Given 11/1/22 1733)     2:00 pm- at bedside.  The  line I reviewed patient's lab findings.  I expressed concern for his leukocytosis and elevated CRP that are now present and were normal when he was seen here earlier today.  He has having chills and complains of headache and neck pain.  This constellation of symptoms and lab findings are concerning for possible meningitis.     Assessments & Plan (with Medical Decision Making)   32 year old male with chills, cough, headache, neck pain, nausea, and vomiting.  Symptoms started yesterday morning.  He had similar symptoms back in May and admitted for bacterial meningitis.  He was initially seen here at 2 AM.  He was afebrile.  He had no leukocytosis.  Hypokalemia likely related to his vomiting.  He was given 2 L of IV normal saline, IV Benadryl, and IV Compazine.  He was tolerating oral intake and was given p.o. potassium for replacement.  He was discharged home at around 6 AM today.  He was provided prescription for Imitrex to use if he has headache restarted.  He has not picked up that medication.  He returns again to the emergency department a few hours later due to return of headache, neck pain, and nausea.  He has had no further vomiting since he left here at 6 AM.      On exam he appears in pain, light sensitive and prefers to keep eyes closed. Pain in his neck with movement and reports stiffness. No palpable neck rigidity. Lung sounds CTA. He has  no coughing during exam.     WBC 14.0 (was 10.1 earlier today).    CRP is elevated at 15.4 (was normal earlier today)  Lactic acid is normal.  Remainder of labs are unremarkable.  His potassium was low earlier today and is now normal.  Rapid strep is negative.  COVID-19 and influenza testing were negative earlier today.  Urinalysis is normal.  Blood cultures x2 are pending.    Lumbar Puncture done by anesthesia.  CSF analysis thus far shows:  --hazy appearance  --797 total nucleated cells, 84% neutrophils, RBC 10  --Protein elevated at 198  --Glucose teresa  --Preliminary gram stain is negative.  --Lyme's, West Nile, bacterial culture, anaerobic culture, HSV 1 and 2 PCR, and meningitis/encephalitis panel are all pending.    Patient given IV NS 1 liter bolus, IV meds for migraine/pain and nausea as noted above. IV Rocephin, IV Vancomycin, and IV Acyclovir started pending CSF results.    Patient needs admission to the hospital for IV antibiotic for possible bacterial meningitis pending his lab results.  He remains stable.    I spoke with on-call tele hospitalist, Dr. Mccrary, who accepts patient for admission.    I have reviewed the nursing notes.    I have reviewed the findings, diagnosis, plan and need for follow up with the patient.      New Prescriptions    No medications on file       Final diagnoses:   Meningitis       11/1/2022   [facility] EMERGENCY DEPT     Roxana Rosa APRN CNP  11/01/22 2000

## 2022-11-01 NOTE — DISCHARGE INSTRUCTIONS
Please read and follow the handout(s) instructions. Return, if needed, for increased or worsening symptoms and as directed by the handout(s).    See the note for work.

## 2022-11-01 NOTE — LETTER
November 1, 2022      To Whom It May Concern:      David Huang was seen in our Emergency Department today, 11/01/22.  I expect his condition to improve over the next 2-3 days.  He may return to work when improved.  If not improved in 2 to 3 days or if he gets increased symptoms then he is encouraged to return to the ER for recheck.    Sincerely,      Giovani Ramírez  Physician  [facility] Emergency Room

## 2022-11-01 NOTE — ANESTHESIA CARE TRANSFER NOTE
Patient: David Huang    Procedure: * No procedures listed *       Diagnosis: * No pre-op diagnosis entered *  Diagnosis Additional Information: No value filed.    Anesthesia Type:   No value filed.     Note:    Oropharynx: oropharynx clear of all foreign objects and spontaneously breathing  Level of Consciousness: drowsy  Oxygen Supplementation: room air    Independent Airway: airway patency satisfactory and stable  Dentition: dentition unchanged    Report to RN Given: handoff report given  Patient transferred to: Emergency Department    Handoff Report: Identifed the Patient, Identified the Reponsible Provider, Reviewed the pertinent medical history, Discussed the surgical course, Reviewed Intra-OP anesthesia mangement and issues during anesthesia, Set expectations for post-procedure period and Allowed opportunity for questions and acknowledgement of understanding      Vitals:  Vitals Value Taken Time   BP     Temp     Pulse     Resp     SpO2         Electronically Signed By: HO Lentz CRNA  November 1, 2022  4:21 PM

## 2022-11-02 LAB
ALBUMIN SERPL-MCNC: 3.2 G/DL (ref 3.4–5)
ALP SERPL-CCNC: 59 U/L (ref 40–150)
ALT SERPL W P-5'-P-CCNC: 16 U/L (ref 0–70)
ANION GAP SERPL CALCULATED.3IONS-SCNC: 7 MMOL/L (ref 3–14)
AST SERPL W P-5'-P-CCNC: 20 U/L (ref 0–45)
BILIRUB SERPL-MCNC: 0.3 MG/DL (ref 0.2–1.3)
BUN SERPL-MCNC: 10 MG/DL (ref 7–30)
CALCIUM SERPL-MCNC: 7.9 MG/DL (ref 8.5–10.1)
CHLORIDE BLD-SCNC: 114 MMOL/L (ref 94–109)
CO2 SERPL-SCNC: 20 MMOL/L (ref 20–32)
CREAT SERPL-MCNC: 0.84 MG/DL (ref 0.66–1.25)
ERYTHROCYTE [DISTWIDTH] IN BLOOD BY AUTOMATED COUNT: 12.8 % (ref 10–15)
GFR SERPL CREATININE-BSD FRML MDRD: >90 ML/MIN/1.73M2
GLUCOSE BLD-MCNC: 120 MG/DL (ref 70–99)
HCT VFR BLD AUTO: 38.3 % (ref 40–53)
HGB BLD-MCNC: 13.3 G/DL (ref 13.3–17.7)
HOLD SPECIMEN: NORMAL
MAGNESIUM SERPL-MCNC: 1.7 MG/DL (ref 1.6–2.3)
MCH RBC QN AUTO: 30.7 PG (ref 26.5–33)
MCHC RBC AUTO-ENTMCNC: 34.7 G/DL (ref 31.5–36.5)
MCV RBC AUTO: 89 FL (ref 78–100)
PLATELET # BLD AUTO: 161 10E3/UL (ref 150–450)
POTASSIUM BLD-SCNC: 3.7 MMOL/L (ref 3.4–5.3)
PROT SERPL-MCNC: 6.5 G/DL (ref 6.8–8.8)
RBC # BLD AUTO: 4.33 10E6/UL (ref 4.4–5.9)
SODIUM SERPL-SCNC: 141 MMOL/L (ref 133–144)
WBC # BLD AUTO: 9.4 10E3/UL (ref 4–11)

## 2022-11-02 PROCEDURE — 258N000003 HC RX IP 258 OP 636: Performed by: NURSE PRACTITIONER

## 2022-11-02 PROCEDURE — 250N000011 HC RX IP 250 OP 636: Performed by: INTERNAL MEDICINE

## 2022-11-02 PROCEDURE — 99233 SBSQ HOSP IP/OBS HIGH 50: CPT | Performed by: INTERNAL MEDICINE

## 2022-11-02 PROCEDURE — 258N000003 HC RX IP 258 OP 636: Performed by: INTERNAL MEDICINE

## 2022-11-02 PROCEDURE — 36415 COLL VENOUS BLD VENIPUNCTURE: CPT | Performed by: INTERNAL MEDICINE

## 2022-11-02 PROCEDURE — 82040 ASSAY OF SERUM ALBUMIN: CPT | Performed by: INTERNAL MEDICINE

## 2022-11-02 PROCEDURE — 83735 ASSAY OF MAGNESIUM: CPT | Performed by: INTERNAL MEDICINE

## 2022-11-02 PROCEDURE — 85027 COMPLETE CBC AUTOMATED: CPT | Performed by: INTERNAL MEDICINE

## 2022-11-02 PROCEDURE — 87389 HIV-1 AG W/HIV-1&-2 AB AG IA: CPT | Performed by: INTERNAL MEDICINE

## 2022-11-02 PROCEDURE — 250N000011 HC RX IP 250 OP 636: Performed by: NURSE PRACTITIONER

## 2022-11-02 PROCEDURE — 250N000013 HC RX MED GY IP 250 OP 250 PS 637: Performed by: INTERNAL MEDICINE

## 2022-11-02 PROCEDURE — 80053 COMPREHEN METABOLIC PANEL: CPT | Performed by: INTERNAL MEDICINE

## 2022-11-02 PROCEDURE — 120N000001 HC R&B MED SURG/OB

## 2022-11-02 RX ORDER — CEFTRIAXONE 2 G/1
2 INJECTION, POWDER, FOR SOLUTION INTRAMUSCULAR; INTRAVENOUS EVERY 12 HOURS
Status: DISCONTINUED | OUTPATIENT
Start: 2022-11-02 | End: 2022-11-07 | Stop reason: HOSPADM

## 2022-11-02 RX ORDER — CEFTRIAXONE 1 G/1
1 INJECTION, POWDER, FOR SOLUTION INTRAMUSCULAR; INTRAVENOUS ONCE
Status: COMPLETED | OUTPATIENT
Start: 2022-11-02 | End: 2022-11-02

## 2022-11-02 RX ADMIN — OXYCODONE HYDROCHLORIDE 5 MG: 5 TABLET ORAL at 01:23

## 2022-11-02 RX ADMIN — VANCOMYCIN HYDROCHLORIDE 1250 MG: 1 INJECTION, POWDER, LYOPHILIZED, FOR SOLUTION INTRAVENOUS at 02:18

## 2022-11-02 RX ADMIN — ACYCLOVIR SODIUM 800 MG: 50 INJECTION, SOLUTION INTRAVENOUS at 00:30

## 2022-11-02 RX ADMIN — CEFTRIAXONE SODIUM 1 G: 1 INJECTION, POWDER, FOR SOLUTION INTRAMUSCULAR; INTRAVENOUS at 08:34

## 2022-11-02 RX ADMIN — ACYCLOVIR SODIUM 800 MG: 50 INJECTION, SOLUTION INTRAVENOUS at 09:10

## 2022-11-02 RX ADMIN — CEFTRIAXONE SODIUM 2 G: 2 INJECTION, POWDER, FOR SOLUTION INTRAMUSCULAR; INTRAVENOUS at 21:30

## 2022-11-02 RX ADMIN — CEFTRIAXONE SODIUM 1 G: 1 INJECTION, POWDER, FOR SOLUTION INTRAMUSCULAR; INTRAVENOUS at 05:36

## 2022-11-02 RX ADMIN — VANCOMYCIN HYDROCHLORIDE 1500 MG: 1 INJECTION, POWDER, LYOPHILIZED, FOR SOLUTION INTRAVENOUS at 10:18

## 2022-11-02 RX ADMIN — OXYCODONE HYDROCHLORIDE 5 MG: 5 TABLET ORAL at 05:48

## 2022-11-02 ASSESSMENT — ACTIVITIES OF DAILY LIVING (ADL)
ADLS_ACUITY_SCORE: 18

## 2022-11-02 NOTE — PROGRESS NOTES
S-(situation): Patient arrives to room 256 via cart from ED    B-(background): Headache, nausea    A-(assessment): Pt's VSS. Pt states via  that he has a bad headache and would like pain medication. Pt states he is tired. Steady gait. Tried to eat, but did not have much of an appetite.     R-(recommendations): Orders reviewed with patient. Will monitor patient per MD orders.     Inpatient nursing criteria listed below were met:    Health care directives status obtained and documented: Yes  SCD's Documented: No  Skin issues/needs documented:NA  Isolation addressed and Signage used: NA  Fall Prevention: Care plan updated NA Education given and documented Yes  Care Plan initiated and Co-Morbidities added: Yes  Education Assessment documented:Yes  Admission Education Documented: Yes  If present CAUTI/CLABI Education done: NA  New medication patient education completed and documented (Possible Side Effects of Common Medications handout): No  Allergies Reviewed: Yes  Admission Medication Reconciliation completed: Yes  Home medications if not able to send immediately home with family stored here: NA  Reminder note placed in discharge instructions regarding home meds: NA  Individualized care needs/preferences addressed and charted: Yes  Provider Notified that patient has arrived to the unit: Yes

## 2022-11-02 NOTE — H&P
[facility]    History and Physical - Hospitalist Service       Date of Admission:  11/1/2022    Chief Complaint : Headache.    History of Present Illness   Mr. Matty Huang is a 32 year old  male with a history of meningitis who returned to the ER today with an intractable headache. Please note that the patient is [place] citizen speaking only and the entire history was obtained with the assistance of an . He was in his normal state of health until yesterday morning at 1 am when he developed an occipital headache 7/10 in severity, fevers, chills, and nausea with 2 episodes of vomiting. He was evaluated in the ER and CT of the head showed no acute intracranial abnormality. He was discharged home but he returned with a persistent headache, disorientation, and difficulty ambulating. Of note, he was diagnosed with meningitis ~6 months ago and he required hospitalization for IV antibiotics. On arrival to the ER he was mildly hypotensive with a blood pressure of 106/84 and his repeat labwork was remarkable for a WBC count of 14.0. He was scheduled for lumbar puncture which showed 797 WBCs with 84% neutrophils. He has received vancomycin gm IV, rocephin 1 gm IV, acyclovir 800 mg IV, dexamethasone 10 mg IV, compazine 10 mg IV, magnesium sulfate 1 gm IV, dilaudid 0.5 mg IV, toradol 15 mg IV, benadryl 25 mg IV, and a 1 liter normal saline bolus in the ER and his headache has improved. He is otherwise in stable condition and he has no other complaints.    Review of Systems    General: Positive for fevers and chills  Eyes: negative for blurred vision, loss of vision  Ear Nose and Throat: negative for pharyngitis, speech or swallowing difficulties  Respiratory:  negative for sputum production, wheezing, WOOTEN, pleuritic pain, sob or cough  Cardiology:  negative for chest pain, palpitations, orthopnea, PND, edema, syncope   Gastrointestinal: positive for nausea and  vomiting  Genitourinary: negative for frequency, urgency, dysuria, hematuria   Neurological: positive for headache and disorientation    Past Medical History    I have reviewed this patient's medical history and updated it with pertinent information if needed.   Past Medical History:   Diagnosis Date     History of meningitis        Past Surgical History   I have reviewed this patient's surgical history and updated it with pertinent information if needed.  History reviewed. No pertinent surgical history.    Social History   I have reviewed this patient's social history and updated it with pertinent information if needed.  Social History     Tobacco Use     Smoking status: Never     Smokeless tobacco: Never   Substance Use Topics     Alcohol use: Yes     Drug use: Never       Family History   I have reviewed this patient's family history and updated it with pertinent information if needed.  Family History   Problem Relation Age of Onset     Other - See Comments Mother         Guillan Doyle Syndrome         Prior to Admission Medications   Prior to Admission Medications   Prescriptions Last Dose Informant Patient Reported? Taking?   Acetaminophen (TYLENOL PO)  Friend Yes No   Patient not taking: Reported on 5/26/2022   NAPROXEN PO  Friend Yes No   Patient not taking: Reported on 5/26/2022   SUMAtriptan (IMITREX) 25 MG tablet  at not started Friend No No   Sig: Take 25-100mg one time. May repeat 25mg every two hours up to a maximum of 200mg in 24 hours.   ibuprofen (ADVIL/MOTRIN) 200 MG tablet 10/31/2022 at hs Self Yes Yes   Sig: Take 200 mg by mouth every 4 hours as needed for pain   ondansetron (ZOFRAN ODT) 4 MG ODT tab  Friend No No   Sig: Take 1 tablet (4 mg) by mouth every 6 hours as needed for nausea   Patient not taking: Reported on 5/26/2022      Facility-Administered Medications: None     Allergies   No Known Allergies    Physical Exam   Vital Signs: Temp: 98.7  F (37.1  C) Temp src: Oral BP: 121/71 Pulse: (!)  125   Resp: 16 SpO2: 96 % O2 Device: None (Room air)    Weight: 159 lbs 0 oz    General:  male in NAD.  HEENT: NCAT.  Neck: No LAD, TMG, or JVD.  CVS: RRR.  Respiratory: CTA(B).  Abdomen: Soft, NT, ND, BS+.  Extremities: No c/c/e.  Neuro: A+Ox4. CN II-XII grossly intact.  Psych: Pleasant, appropriate.  Skin: No skin tear, rash, or ulcer.    Data     Recent Labs   Lab 11/01/22  1229 11/01/22  0306 11/01/22  0305   WBC 14.0* 10.1  --    HGB 14.4 14.3  --    MCV 89 87  --     219  --      --  142   POTASSIUM 4.0  --  2.9*   CHLORIDE 112*  --  110*   CO2 25  --  24   BUN 12  --  19   CR 0.97  --  0.86   ANIONGAP 6  --  8   LAKHWINDER 8.5  --  8.5   *  --  147*   ALBUMIN 3.8  --  4.3   PROTTOTAL 7.3  --  7.3   BILITOTAL 0.3  --  0.4   ALKPHOS 72  --  74   ALT 23  --  20   AST 22  --  23   LIPASE  --   --  119         Assessment/Plan   1. Acute bacterial meningitis   -Continue IV vancomycin, rocephin, and acyclovir. Await CSF cultures. Consult neurology.    2. Intractable headache  -Continue supportive care with pain control, IV fluids, and as needed antiemetics.    3. Prophylaxis  -Lovenox for DVT prophylaxis.    4. Code status  -Full code.    5. Disposition  -Anticipate discharge home in the next several days pending further management of his meningitis.    FAHEEM BAUTISTA MD  [facility]  Securely message with the Vocera Web Console (learn more here)  Text page via [employer] Paging/Directory      Visit/Communication Style   Virtual (Video) communication was used to evaluate David.  David consented to the use of video communication: yes  Video START time: 10:15 pm, 11/1/2022  Video STOP time: 10:30 pm, 11/1/2022   Patient's location: [facility]   Provider's location during the visit: [facility]

## 2022-11-02 NOTE — PROGRESS NOTES
[facility]    Medicine Progress Note - Hospitalist Service    Date of Admission:  11/1/2022    Assessment & Plan   Hardik Humphrey is a 32-year-old male history of meningitis (presumed bacterial) in May 2022 now presenting with acute onset intractable headache, fever, chills, nausea and vomiting with abnormal CSF analysis obtained on current admission; overall clinical picture highly suspicious for bacterial meningitis.         #Headache  #Sepsis, likely due to meningitis presumed bacterial  #Abnormal CSF analysis concerning for bacterial meningitis  #Leukocytosis, with left shift  #Hypokalemia-resolved  Patient presenting with acute onset headache, fever, chills, nausea and vomiting.  No history of abnormal CSF in May 2022, though CSF culture during the time showed negative bacterial culture, testing for streptococcal pneumonia, cryptococcus, viral panel was negative during that admission.   CSF analysis on this admission however, does show 797 total nucleated cells (84% neutrophils), 10 RBC, glucose 68, total protein 198.  The predominance of PMNs shortness supports bacterial process for meningitis.  Of note, since arrival, patient has received IV fluid bolus of up to 4 L.  Patient has received empiric treatment with acyclovir, ceftriaxone total of 2 g, x2, Decadron 10 mg IV x1, and also started on vancomycin.  For headache, patient has been receiving oxycodone and also getting Compazine as an antiemetic.      Continue empiric treatment for meningitis with;    Discontinue Acyclovir    Cont Ceftriaxone 2 g daily    Discontinue Vancomycin.     Hold dexamethasone given negative strep pneumo on PCR panel.      Sent karius test to [place]    Discontinue IV fluids, okay to resume p.o. intake.      For headache, nausea continue:    As needed Tylenol, Compazine    Diet: Combination Diet Regular Diet Adult    DVT Prophylaxis: Enoxaparin (Lovenox) SQ  Reinoso Catheter: Not present  Central  Lines: None  Cardiac Monitoring: None  Code Status: Full Code      Disposition Plan      Expected Discharge Date: 11/03/2022                The patient's care was discussed with the Patient.    RAJWINDER TATE MD  Hospitalist Service  [facility]  Securely message with the Vocera Web Console (learn more here)  Text page via [facility] Paging/Directory         Clinically Significant Risk Factors Present on Admission        # Hypokalemia: Lowest K = 2.9 mmol/L (Ref range: 3.4-5.3) in last 2 days, will replace as needed   # Hypocalcemia: Lowest Ca = 7.9 mg/dL (Ref range: 8.5 - 10.1 mg/dL) in last 2 days, will monitor and replace as appropriate     # Hypoalbuminemia: Lowest albumin = 3.2 g/dL (Ref range: 3.5-5.2) at 11/2/2022  5:44 AM, will monitor as appropriate                 ______________________________________________________________________    Interval History   Today, patient denies any acute complaints.  Reports headache has been resolved.  Just having some mild neck pain along the midline.  He denies any nausea or vomiting.  No fevers.  Reports his p.o. intake is improved.  Patient denies any recent travel history or any sick contacts.  He denies any recreational drug use.  Patient does not drink alcohol or use any IV recreational drugs.    Data reviewed today: I reviewed all medications, new labs and imaging results over the last 24 hours.      Physical Exam   Vital Signs: Temp: 100.2  F (37.9  C) Temp src: Oral BP: 120/58 Pulse: 102   Resp: 20 SpO2: 96 % O2 Device: None (Room air)    Weight: 159 lbs 0 oz      General appearance: Alert, in no acute distress and Ox3   HEENT: Normocephalic, atraumatic; Pupils are equal, round and react to light; Extraocular   movements intact; Conjuctivae are Normal; Mucous membranes moist and without lesions   Pulm: clear to ausculation bilaterally, no wheeze, rales or rhonchi   CVS: RRR, no m/r/g   GI: Abdomen soft, non-tender throughout,  normoactive bowel sounds and No rebound or guarding   Extremities: No cyanosis, clubbing or edema   Skin: Large area of erythema in right side of back.  Per patient has been present since childhood (see image below)    Neurologic:   - Mental Status: Alert, relaxed, and cooperative. Thought process coherent. Oriented to   person, place, and time.   - Cranial Nerves: Il through Xll intact.   - Motor: Good muscle bulk and tone.   - Strength 5/5 throughout.   - Cerebellar-- finger-to-nose intact bilaterally.   - Gait with normal base.   - Romberg--maintains balance with eyes closed.   - No pronator drift.    - Sensory: Sensation to light touch intact throughout        Data   Recent Labs   Lab 11/02/22  0615 11/02/22  0544 11/01/22  1229 11/01/22  0306 11/01/22  0305   WBC 9.4  --  14.0* 10.1  --    HGB 13.3  --  14.4 14.3  --    MCV 89  --  89 87  --      --  183 219  --    NA  --  141 143  --  142   POTASSIUM  --  3.7 4.0  --  2.9*   CHLORIDE  --  114* 112*  --  110*   CO2  --  20 25  --  24   BUN  --  10 12  --  19   CR  --  0.84 0.97  --  0.86   ANIONGAP  --  7 6  --  8   LAKHWINDER  --  7.9* 8.5  --  8.5   GLC  --  120* 148*  --  147*   ALBUMIN  --  3.2* 3.8  --  4.3   PROTTOTAL  --  6.5* 7.3  --  7.3   BILITOTAL  --  0.3 0.3  --  0.4   ALKPHOS  --  59 72  --  74   ALT  --  16 23  --  20   AST  --  20 22  --  23   LIPASE  --   --   --   --  119     No results found for this or any previous visit (from the past 24 hour(s)).  Medications     sodium chloride 125 mL/hr at 11/01/22 1707       cefTRIAXone  2 g Intravenous Q12H     enoxaparin ANTICOAGULANT  40 mg Subcutaneous Q24H     sodium chloride (PF)  3 mL Intracatheter Q8H

## 2022-11-02 NOTE — PROGRESS NOTES
[facility]  I was called by Dr. Stevens on 11/2/2022 at 12:15 PM to provide input for David Huang MRN [id number]. The patient is located at [facility] [place]. The nature of this [facility] does not permit me to perform a comprehensive review of health care records, patient/family interview, nor an examination of the patient. I obtained limited patient information from the provider on the phone call and a limited chart review.    Based on only the information I was provided today, I make the following recommendations to the treating provider/team for their review and consideration:  For recurrent meningitis.     His LP is mostly neutrophilic with elevated protein and normal glucose. He is on empiric acv, ceftriaxone, dex and vanco. Continue Ceftriaxone for now. The pt is clinically improved. He had a similar presentation with similar LP results a few months ago.  His MEP panel is negative. Most common causes of recurrent meningitis are lymphocytic such as NSAIDs, bactrim, HSV, other viruses etc. He is on NSAIDs at home. The pt is an immigrant and TB is in the differential. However his LP does not indicate a TB meningitis, but more typical bacterial process. In addition, he is getting better with non TB drugs also makes it less likely. His RPR was normal in the past. Recommend that he get a Karius assay from his CSF if possible.         These recommendations are not intended to take the place of the care team's clinical judgement, which should always be utilized to provide the most appropriate care to meet the unique needs of each patient. The recommendations offered were based on the limited scope of information provided as today's date. Should additional guidance be needed or required a formal consultation with infectious diseases is recommended.        *If a patient is discharged on IV antibiotics it is not the responsibility of the ID  curbside provider to monitor labs or sign for antibiotic orders unless it is otherwise stated. If further outpatient management is required then an outpatient ID consult should be placed.

## 2022-11-02 NOTE — PLAN OF CARE
Goal Outcome Evaluation:      Plan of Care Reviewed With: patient    Overall Patient Progress: improvingOverall Patient Progress: improving    Outcome Evaluation: Pt is A & O x 4, interpretor used for language barrier and works well. VSS, low grade temp of 100.2, down to 98.2. On room air. Denies any pain this shift. Neuros intact. Lung sounds CTA. Voiding well. IV abx per MAR. IV fluids discontinued this shift. Potassium and magnesium to be rechecked in AM per protocol.

## 2022-11-02 NOTE — PLAN OF CARE
Goal Outcome Evaluation:      Plan of Care Reviewed With: patient    Overall Patient Progress: no changeOverall Patient Progress: no change     Vitals stable. Dilaudid and oxy given for H/A. No change in neuro assessments. IV Rocephin, Zovirax and Vanco given. K and Mag protocols will be ran in the AM. Pt has been able to sleep well throughout shift. Pt declined Lovenox. Neurology consult in the AM. Will continue to monitor.

## 2022-11-03 LAB
HIV 1+2 AB+HIV1 P24 AG SERPL QL IA: NONREACTIVE
HOLD SPECIMEN: NORMAL
MAGNESIUM SERPL-MCNC: 1.9 MG/DL (ref 1.6–2.3)
POTASSIUM BLD-SCNC: 3.7 MMOL/L (ref 3.4–5.3)

## 2022-11-03 PROCEDURE — 120N000001 HC R&B MED SURG/OB

## 2022-11-03 PROCEDURE — 84132 ASSAY OF SERUM POTASSIUM: CPT | Performed by: INTERNAL MEDICINE

## 2022-11-03 PROCEDURE — 250N000013 HC RX MED GY IP 250 OP 250 PS 637: Performed by: INTERNAL MEDICINE

## 2022-11-03 PROCEDURE — 250N000011 HC RX IP 250 OP 636: Performed by: INTERNAL MEDICINE

## 2022-11-03 PROCEDURE — 83735 ASSAY OF MAGNESIUM: CPT | Performed by: INTERNAL MEDICINE

## 2022-11-03 PROCEDURE — 36415 COLL VENOUS BLD VENIPUNCTURE: CPT | Performed by: INTERNAL MEDICINE

## 2022-11-03 PROCEDURE — 99232 SBSQ HOSP IP/OBS MODERATE 35: CPT | Performed by: INTERNAL MEDICINE

## 2022-11-03 PROCEDURE — 258N000003 HC RX IP 258 OP 636: Performed by: NURSE PRACTITIONER

## 2022-11-03 RX ORDER — BENZONATATE 100 MG/1
100 CAPSULE ORAL 3 TIMES DAILY PRN
Status: DISCONTINUED | OUTPATIENT
Start: 2022-11-03 | End: 2022-11-07 | Stop reason: HOSPADM

## 2022-11-03 RX ADMIN — CEFTRIAXONE SODIUM 2 G: 2 INJECTION, POWDER, FOR SOLUTION INTRAMUSCULAR; INTRAVENOUS at 09:06

## 2022-11-03 RX ADMIN — SODIUM CHLORIDE: 9 INJECTION, SOLUTION INTRAVENOUS at 09:06

## 2022-11-03 RX ADMIN — OXYCODONE HYDROCHLORIDE 5 MG: 5 TABLET ORAL at 09:12

## 2022-11-03 RX ADMIN — CEFTRIAXONE SODIUM 2 G: 2 INJECTION, POWDER, FOR SOLUTION INTRAMUSCULAR; INTRAVENOUS at 22:05

## 2022-11-03 RX ADMIN — BENZONATATE 100 MG: 100 CAPSULE ORAL at 23:02

## 2022-11-03 RX ADMIN — ENOXAPARIN SODIUM 40 MG: 40 INJECTION SUBCUTANEOUS at 22:09

## 2022-11-03 RX ADMIN — ACETAMINOPHEN 650 MG: 325 TABLET ORAL at 06:10

## 2022-11-03 ASSESSMENT — ACTIVITIES OF DAILY LIVING (ADL)
ADLS_ACUITY_SCORE: 18

## 2022-11-03 NOTE — PROGRESS NOTES
VSS. A & O x4, uses  for assessment. Denies pain. Neuros intact. IV rochephin given. Indep in room. Electrolyte recheck in the AM.

## 2022-11-03 NOTE — PLAN OF CARE
Goal Outcome Evaluation:      Plan of Care Reviewed With: patient    Overall Patient Progress: improvingOverall Patient Progress: improving    Outcome Evaluation: Patient remains A&Ox4, VSS on room air, afebrile. Intepretor used for assessment. Low grade temp 100. Tylenol given x1 for neck pain. Neuro's intact. Lungs CTA. Adequate UOP, ambulating to bathroom independently. K+ and mag rechecks this AM. Slept between cares.

## 2022-11-03 NOTE — PLAN OF CARE
Goal Outcome Evaluation:      Plan of Care Reviewed With: patient    Overall Patient Progress: improvingOverall Patient Progress: improving    Outcome Evaluation: Pt is A&O.  VSS.  Afebrile.  On R/A.  Sats good.  Up independently.  Had a sore throat earlier but now states is better.  LS - clear.  Adequate I&O.

## 2022-11-03 NOTE — PROGRESS NOTES
[facility]    Medicine Progress Note - Hospitalist Service    Date of Admission:  11/1/2022    Assessment & Plan   Hardik Humphrey is a 32-year-old male history of meningitis (presumed bacterial) in May 2022 now presenting with acute onset intractable headache, fever, chills, nausea and vomiting with abnormal CSF analysis obtained on current admission; overall clinical picture highly suspicious for bacterial meningitis.         #Headache - resolved  #Sepsis, likely due to meningitis presumed bacterial  #Abnormal CSF analysis concerning for bacterial meningitis  #Leukocytosis, with left shift  #Hypokalemia-resolved  Patient presenting with acute onset headache, fever, chills, nausea and vomiting.  No history of abnormal CSF in May 2022, though CSF culture during the time showed negative bacterial culture, testing for streptococcal pneumonia, cryptococcus, viral panel was negative during that admission.   CSF analysis on this admission however, does show 797 total nucleated cells (84% neutrophils), 10 RBC, glucose 68, total protein 198.  The predominance of PMNs shortness supports bacterial process for meningitis.  Of note, since arrival, patient has received IV fluid bolus of up to 4 L.  Patient has received empiric treatment with acyclovir, ceftriaxone total of 2 g,  Decadron 10 mg IV x1, and also started on vancomycin.  For headache, patient has been receiving oxycodone and also getting Compazine as an antiemetic.  Acyclovir and vancomycin has since been discontinued.  Dexamethasone held at this time.  Patient's presenting symptoms continues to improve.  Patient denies any recent travel history or any sick contacts.  He denies any recreational drug use.  Patient does not drink alcohol or use any IV recreational drugs.    Continue empiric treatment for meningitis with    Cont Ceftriaxone 2 g daily anticipate 7 to 14 days treatment    Sent karius test to [place]    Discontinue IV  fluids, okay to resume p.o. intake.      For headache, nausea continue:    As needed Tylenol, Compazine    Diet: Combination Diet Regular Diet Adult    DVT Prophylaxis: Enoxaparin (Lovenox) SQ  Reinoso Catheter: Not present  Central Lines: None  Cardiac Monitoring: None  Code Status: Full Code      Disposition Plan     Expected Discharge Date: 11/03/2022,  9:00 AM          Inpatient level of care requiring IV antibiotics for presumed bacterial meningitis.  Overall symptoms continues to improve.     The patient's care was discussed with the Patient.    RAJWINDER TATE MD  Hospitalist Service  [facility]  Securely message with the Vocera Web Console (learn more here)  Text page via [email or web address] Paging/Directory         Clinically Significant Risk Factors          # Hypocalcemia: Lowest Ca = 7.9 mg/dL (Ref range: 8.5 - 10.1 mg/dL) in last 2 days, will monitor and replace as appropriate     # Hypoalbuminemia: Lowest albumin = 3.2 g/dL (Ref range: 3.5-5.2) at 11/2/2022  5:44 AM, will monitor as appropriate                   ______________________________________________________________________    Interval History   Patient denies any acute complaints.  No fevers overnight.  Has remained hemodynamically stable.  P.o. intake has been adequate.    Data reviewed today: I reviewed all medications, new labs and imaging results over the last 24 hours.      Physical Exam   Vital Signs: Temp: 97.6  F (36.4  C) Temp src: Oral BP: 117/57 Pulse: 89   Resp: 18 SpO2: 96 % O2 Device: None (Room air)    Weight: 159 lbs 0 oz      General appearance: Alert, in no acute distress and Ox3   HEENT: Normocephalic, atraumatic; Pupils are equal, round and react to light; Extraocular   movements intact; Conjuctivae are Normal; Mucous membranes moist and without lesions   Pulm: clear to ausculation bilaterally, no wheeze, rales or rhonchi   CVS: RRR, no m/r/g   GI: Abdomen soft, non-tender throughout, normoactive bowel  sounds and No rebound or guarding   Extremities: No cyanosis, clubbing or edema   Skin: Large area of erythema in right side of back.  Per patient has been present since childhood (see image below)  Neurologic:   - Mental Status: Alert, relaxed, and cooperative. Thought process coherent. Oriented to   person, place, and time.   - Cranial Nerves: Il through Xll intact.   - Motor: Good muscle bulk and tone.   - Strength 5/5 throughout.   - Cerebellar-- finger-to-nose intact bilaterally.   - Gait with normal base.   - Romberg--maintains balance with eyes closed.   - No pronator drift.    - Sensory: Sensation to light touch intact throughout        Data   Recent Labs   Lab 11/03/22  0614 11/02/22  0615 11/02/22  0544 11/01/22  1229 11/01/22  0306 11/01/22  0305   WBC  --  9.4  --  14.0* 10.1  --    HGB  --  13.3  --  14.4 14.3  --    MCV  --  89  --  89 87  --    PLT  --  161  --  183 219  --    NA  --   --  141 143  --  142   POTASSIUM 3.7  --  3.7 4.0  --  2.9*   CHLORIDE  --   --  114* 112*  --  110*   CO2  --   --  20 25  --  24   BUN  --   --  10 12  --  19   CR  --   --  0.84 0.97  --  0.86   ANIONGAP  --   --  7 6  --  8   LAKHWINDER  --   --  7.9* 8.5  --  8.5   GLC  --   --  120* 148*  --  147*   ALBUMIN  --   --  3.2* 3.8  --  4.3   PROTTOTAL  --   --  6.5* 7.3  --  7.3   BILITOTAL  --   --  0.3 0.3  --  0.4   ALKPHOS  --   --  59 72  --  74   ALT  --   --  16 23  --  20   AST  --   --  20 22  --  23   LIPASE  --   --   --   --   --  119     No results found for this or any previous visit (from the past 24 hour(s)).  Medications       cefTRIAXone  2 g Intravenous Q12H     enoxaparin ANTICOAGULANT  40 mg Subcutaneous Q24H     sodium chloride (PF)  3 mL Intracatheter Q8H

## 2022-11-04 LAB
HOLD SPECIMEN: NORMAL
MAGNESIUM SERPL-MCNC: 1.9 MG/DL (ref 1.6–2.3)
POTASSIUM BLD-SCNC: 3.4 MMOL/L (ref 3.4–5.3)
POTASSIUM BLD-SCNC: 3.9 MMOL/L (ref 3.4–5.3)

## 2022-11-04 PROCEDURE — 84132 ASSAY OF SERUM POTASSIUM: CPT | Performed by: INTERNAL MEDICINE

## 2022-11-04 PROCEDURE — 250N000011 HC RX IP 250 OP 636: Performed by: INTERNAL MEDICINE

## 2022-11-04 PROCEDURE — 120N000001 HC R&B MED SURG/OB

## 2022-11-04 PROCEDURE — 83735 ASSAY OF MAGNESIUM: CPT | Performed by: INTERNAL MEDICINE

## 2022-11-04 PROCEDURE — 250N000013 HC RX MED GY IP 250 OP 250 PS 637: Performed by: INTERNAL MEDICINE

## 2022-11-04 PROCEDURE — 250N000009 HC RX 250: Performed by: INTERNAL MEDICINE

## 2022-11-04 PROCEDURE — 99232 SBSQ HOSP IP/OBS MODERATE 35: CPT | Performed by: INTERNAL MEDICINE

## 2022-11-04 PROCEDURE — 36415 COLL VENOUS BLD VENIPUNCTURE: CPT | Performed by: INTERNAL MEDICINE

## 2022-11-04 RX ORDER — POTASSIUM CHLORIDE 1500 MG/1
40 TABLET, EXTENDED RELEASE ORAL ONCE
Status: COMPLETED | OUTPATIENT
Start: 2022-11-04 | End: 2022-11-04

## 2022-11-04 RX ADMIN — ACETAMINOPHEN 650 MG: 325 TABLET ORAL at 03:51

## 2022-11-04 RX ADMIN — CEFTRIAXONE SODIUM 2 G: 2 INJECTION, POWDER, FOR SOLUTION INTRAMUSCULAR; INTRAVENOUS at 21:25

## 2022-11-04 RX ADMIN — ENOXAPARIN SODIUM 40 MG: 40 INJECTION SUBCUTANEOUS at 21:23

## 2022-11-04 RX ADMIN — POTASSIUM CHLORIDE 40 MEQ: 1500 TABLET, EXTENDED RELEASE ORAL at 06:39

## 2022-11-04 RX ADMIN — CEFTRIAXONE SODIUM 2 G: 2 INJECTION, POWDER, FOR SOLUTION INTRAMUSCULAR; INTRAVENOUS at 10:08

## 2022-11-04 RX ADMIN — TOPICAL ANESTHETIC 0.5 ML: 200 SPRAY DENTAL; PERIODONTAL at 03:53

## 2022-11-04 ASSESSMENT — ACTIVITIES OF DAILY LIVING (ADL)
ADLS_ACUITY_SCORE: 18

## 2022-11-04 NOTE — PROGRESS NOTES
[facility]    Medicine Progress Note - Hospitalist Service    Date of Admission:  11/1/2022    Assessment & Plan   Hardik Humphrey is a 32-year-old male history of meningitis (presumed bacterial) in May 2022 now presenting with acute onset intractable headache, fever, chills, nausea and vomiting with abnormal CSF analysis obtained on current admission; overall clinical picture highly suspicious for bacterial meningitis.         11/4 :     No headache or neck pain, no nausea, vomiting  No vision issues or speech issues, no focal deficits.  No fevers  Tests for Lyme , [place] in progress  Cont Ceftriaxone 2 g daily anticipate 7 to 14 days treatment  Patient needs to complete iv antibiotic course in hospital   No other new issues noted today      Not medically ready for discharge at this time.          A/p :       #Headache - resolved  #Sepsis, likely due to meningitis presumed bacterial  #Abnormal CSF analysis concerning for bacterial meningitis  #Leukocytosis, with left shift  #Hypokalemia-resolved  Patient presenting with acute onset headache, fever, chills, nausea and vomiting.  No history of abnormal CSF in May 2022, though CSF culture during the time showed negative bacterial culture, testing for streptococcal pneumonia, cryptococcus, viral panel was negative during that admission.   CSF analysis on this admission however, does show 797 total nucleated cells (84% neutrophils), 10 RBC, glucose 68, total protein 198.  The predominance of PMNs shortness supports bacterial process for meningitis.  Of note, since arrival, patient has received IV fluid bolus of up to 4 L.  Patient has received empiric treatment with acyclovir, ceftriaxone total of 2 g,  Decadron 10 mg IV x1, and also started on vancomycin.  For headache, patient has been receiving oxycodone and also getting Compazine as an antiemetic.  Acyclovir and vancomycin has since been discontinued.  Dexamethasone held at  this time.  Patient's presenting symptoms continues to improve.  Patient denies any recent travel history or any sick contacts.  He denies any recreational drug use.  Patient does not drink alcohol or use any IV recreational drugs.    Continue empiric treatment for meningitis with    Cont Ceftriaxone 2 g daily anticipate 7 to 14 days treatment    Sent kvngius test to [place]    Discontinue IV fluids, okay to resume p.o. intake.      For headache, nausea continue:    As needed Tylenol, Compazine    Diet: Combination Diet Regular Diet Adult    DVT Prophylaxis: Enoxaparin (Lovenox) SQ  Reinoso Catheter: Not present  Central Lines: None  Cardiac Monitoring: None  Code Status: Full Code      Disposition Plan     Expected Discharge Date: 11/09/2022,  9:00 AM  Discharge Delays: IV Medication - consider oral or Home Infusion        Inpatient level of care requiring IV antibiotics for presumed bacterial meningitis.  Overall symptoms continues to improve.     The patient's care was discussed with the Patient.    Dayton Savage MD  Hospitalist Service  [facility]  Securely message with the Vocera Web Console (learn more here)  Text page via [employer] Paging/Directory         Clinically Significant Risk Factors              # Hypoalbuminemia: Lowest albumin = 3.2 g/dL (Ref range: 3.5-5.2) at 11/2/2022  5:44 AM, will monitor as appropriate                   ______________________________________________________________________      Data reviewed today: I reviewed all medications, new labs and imaging results over the last 24 hours.      Physical Exam   Vital Signs: Temp: 98.2  F (36.8  C) Temp src: Oral BP: 102/64 Pulse: 83   Resp: 18 SpO2: 95 % O2 Device: None (Room air)    Weight: 159 lbs 0 oz      General appearance: Alert, in no acute distress and Ox3   HEENT: Normocephalic, atraumatic; Pupils are equal, round and react to light; Extraocular   movements intact; Conjuctivae are Normal; Mucous membranes  moist and without lesions   Pulm: clear to ausculation bilaterally, no wheeze, rales or rhonchi   CVS: RRR, no m/r/g   GI: Abdomen soft, non-tender throughout, normoactive bowel sounds and No rebound or guarding   Extremities: No cyanosis, clubbing or edema   Skin: Large area of erythema in right side of back.  Per patient has been present since childhood (see image below)  Neurologic:   - Mental Status: Alert, relaxed, and cooperative. Thought process coherent. Oriented to   person, place, and time.   - Cranial Nerves: Il through Xll intact.   - Motor: Good muscle bulk and tone.   - Strength 5/5 throughout.   - Cerebellar-- finger-to-nose intact bilaterally.   - Gait with normal base.   - Romberg--maintains balance with eyes closed.   - No pronator drift.    - Sensory: Sensation to light touch intact throughout        Data   Recent Labs   Lab 11/04/22  1148 11/04/22  0526 11/03/22  0614 11/02/22  0615 11/02/22  0544 11/01/22  1229 11/01/22  0306 11/01/22  0305   WBC  --   --   --  9.4  --  14.0* 10.1  --    HGB  --   --   --  13.3  --  14.4 14.3  --    MCV  --   --   --  89  --  89 87  --    PLT  --   --   --  161  --  183 219  --    NA  --   --   --   --  141 143  --  142   POTASSIUM 3.9 3.4 3.7  --  3.7 4.0  --  2.9*   CHLORIDE  --   --   --   --  114* 112*  --  110*   CO2  --   --   --   --  20 25  --  24   BUN  --   --   --   --  10 12  --  19   CR  --   --   --   --  0.84 0.97  --  0.86   ANIONGAP  --   --   --   --  7 6  --  8   LAKHWINDER  --   --   --   --  7.9* 8.5  --  8.5   GLC  --   --   --   --  120* 148*  --  147*   ALBUMIN  --   --   --   --  3.2* 3.8  --  4.3   PROTTOTAL  --   --   --   --  6.5* 7.3  --  7.3   BILITOTAL  --   --   --   --  0.3 0.3  --  0.4   ALKPHOS  --   --   --   --  59 72  --  74   ALT  --   --   --   --  16 23  --  20   AST  --   --   --   --  20 22  --  23   LIPASE  --   --   --   --   --   --   --  119     No results found for this or any previous visit (from the past 24  hour(s)).  Medications       cefTRIAXone  2 g Intravenous Q12H     enoxaparin ANTICOAGULANT  40 mg Subcutaneous Q24H     sodium chloride (PF)  3 mL Intracatheter Q8H

## 2022-11-04 NOTE — PLAN OF CARE
Goal Outcome Evaluation:      Plan of Care Reviewed With: patient    Overall Patient Progress: improvingOverall Patient Progress: improving    Outcome Evaluation: Primary complaint overnight was of sore throat, burning and cough. Tessalon and Hurricaine spray ordered and given. No complaints of head or neck pain. Neuro checks normal. IV Rocephin, otherwise saline locked. PIV in left AC intact, no s/s of phlebitis or infiltration. Potassium and Magnesium protocols, awaiting AM rechecks. Mild tachycardia with heart rate up to 104. Afebrile. Up independently. Armenian speaking with minimal English, use [employer] interpretper via iPad in room. Pleaseant and cooperative with cares.

## 2022-11-05 LAB
B BURGDOR IGG CSF QL IB: NEGATIVE
B BURGDOR IGM CSF QL IB: NEGATIVE
MAGNESIUM SERPL-MCNC: 2 MG/DL (ref 1.6–2.3)
POTASSIUM BLD-SCNC: 3.8 MMOL/L (ref 3.4–5.3)
WNV IGG CSF IA-ACNC: 1.76 IV
WNV IGM CSF IA-ACNC: 0.05 IV

## 2022-11-05 PROCEDURE — 84132 ASSAY OF SERUM POTASSIUM: CPT | Performed by: INTERNAL MEDICINE

## 2022-11-05 PROCEDURE — 83735 ASSAY OF MAGNESIUM: CPT | Performed by: INTERNAL MEDICINE

## 2022-11-05 PROCEDURE — 250N000011 HC RX IP 250 OP 636: Performed by: INTERNAL MEDICINE

## 2022-11-05 PROCEDURE — 120N000001 HC R&B MED SURG/OB

## 2022-11-05 PROCEDURE — 36415 COLL VENOUS BLD VENIPUNCTURE: CPT | Performed by: INTERNAL MEDICINE

## 2022-11-05 PROCEDURE — 99232 SBSQ HOSP IP/OBS MODERATE 35: CPT | Performed by: INTERNAL MEDICINE

## 2022-11-05 RX ADMIN — ENOXAPARIN SODIUM 40 MG: 40 INJECTION SUBCUTANEOUS at 21:23

## 2022-11-05 RX ADMIN — CEFTRIAXONE SODIUM 2 G: 2 INJECTION, POWDER, FOR SOLUTION INTRAMUSCULAR; INTRAVENOUS at 21:23

## 2022-11-05 RX ADMIN — CEFTRIAXONE SODIUM 2 G: 2 INJECTION, POWDER, FOR SOLUTION INTRAMUSCULAR; INTRAVENOUS at 08:18

## 2022-11-05 ASSESSMENT — ACTIVITIES OF DAILY LIVING (ADL)
ADLS_ACUITY_SCORE: 18

## 2022-11-05 NOTE — PLAN OF CARE
Goal Outcome Evaluation:      Plan of Care Reviewed With: patient    Overall Patient Progress: improvingOverall Patient Progress: improving    Outcome Evaluation: Afrebrile. IV rocephin. Denying any pain. Slept well through the night. Neuros are WDL. Lumbar puncture site CDL. Patient independent in room.

## 2022-11-05 NOTE — PLAN OF CARE
Goal Outcome Evaluation:      Plan of Care Reviewed With: patient    Overall Patient Progress: improvingOverall Patient Progress: improving    Outcome Evaluation: Afebrile. IV rocephin infused without difficulty. Denying any pain. Neuros are WDL. Lumbar puncture site remains CDI. Patient independent in room.

## 2022-11-05 NOTE — PROGRESS NOTES
Patient has given permission for Xin Madison to have information about him. Okay for her to have patient code. Attempted to call her and it went to voicemail.

## 2022-11-05 NOTE — PROGRESS NOTES
[facility]    Medicine Progress Note - Hospitalist Service    Date of Admission:  11/1/2022    Assessment & Plan   Hardik Humphrey is a 32-year-old male history of meningitis (presumed bacterial) in May 2022 now presenting with acute onset intractable headache, fever, chills, nausea and vomiting with abnormal CSF analysis obtained on current admission; overall clinical picture highly suspicious for bacterial meningitis.         11/5 :   No headache or neck pain, no nausea, vomiting  No vision issues or speech issues, no focal deficits.  No fevers  Tests for Lyme in progress  Cont Ceftriaxone 2 g daily anticipate 7 to 14 days treatment  Patient needs to complete iv antibiotic course in hospital   No other new issues noted today      Not medically ready for discharge at this time.          A/P :   #Headache - resolved  #Sepsis, likely due to meningitis presumed bacterial  #Abnormal CSF analysis concerning for bacterial meningitis  #Leukocytosis, with left shift  #Hypokalemia-resolved  Patient presenting with acute onset headache, fever, chills, nausea and vomiting.  No history of abnormal CSF in May 2022, though CSF culture during the time showed negative bacterial culture, testing for streptococcal pneumonia, cryptococcus, viral panel was negative during that admission.   CSF analysis on this admission however, does show 797 total nucleated cells (84% neutrophils), 10 RBC, glucose 68, total protein 198.  The predominance of PMNs shortness supports bacterial process for meningitis.  -Of note, since arrival, patient has received IV fluid bolus of up to 4 L.  Patient has received empiric treatment with acyclovir, ceftriaxone total of 2 g,  Decadron 10 mg IV x1, and also started on vancomycin.  For headache, patient has been receiving oxycodone and also getting Compazine as an antiemetic.  -Positive West Nile IgG but negative West Nile IgM (which may represent past  infection)  -Acyclovir and vancomycin has since been discontinued.  Dexamethasone held at this time.    -Patient's presenting symptoms continues to improve.  Patient denies any recent travel history or any sick contacts.  He denies any recreational drug use.  Patient does not drink alcohol or use any IV recreational drugs.    Continue empiric treatment for meningitis with    Cont Ceftriaxone 2 g daily anticipate 7 to 14 days treatment    Sent karius test to [place]    Discontinue IV fluids, okay to resume p.o. intake.      For headache, nausea continue:    As needed Tylenol, Compazine    Diet: Combination Diet Regular Diet Adult    DVT Prophylaxis: Enoxaparin (Lovenox) SQ  Reinoso Catheter: Not present  Central Lines: None  Cardiac Monitoring: None  Code Status: Full Code      Disposition Plan     Expected Discharge Date: 11/09/2022,  9:00 AM  Discharge Delays: IV Medication - consider oral or Home Infusion    Discharge Comments: needs to complete IV ABX course.    Inpatient level of care requiring IV antibiotics for presumed bacterial meningitis.  Overall symptoms continues to improve.     The patient's care was discussed with the Patient.    RAJWINDER TATE MD  Hospitalist Service  [facility]  Securely message with the Vocera Web Console (learn more here)  Text page via [employer] Paging/Directory         Clinically Significant Risk Factors              # Hypoalbuminemia: Lowest albumin = 3.2 g/dL (Ref range: 3.5-5.2) at 11/2/2022  5:44 AM, will monitor as appropriate                   ______________________________________________________________________      Data reviewed today: I reviewed all medications, new labs and imaging results over the last 24 hours.      Physical Exam   Vital Signs: Temp: 97.4  F (36.3  C) Temp src: Oral BP: 123/68 Pulse: 76   Resp: 16 SpO2: 98 % O2 Device: None (Room air)    Weight: 159 lbs 0 oz      General appearance: Alert, in no acute distress and Ox3   HEENT:  Normocephalic, atraumatic; Pupils are equal, round and react to light; Extraocular   movements intact; Conjuctivae are Normal; Mucous membranes moist and without lesions   Pulm: clear to ausculation bilaterally, no wheeze, rales or rhonchi   CVS: RRR, no m/r/g   GI: Abdomen soft, non-tender throughout, normoactive bowel sounds and No rebound or guarding   Extremities: No cyanosis, clubbing or edema   Skin: Large area of erythema in right side of back.  Per patient has been present since childhood (see image below)  Neurologic:   - Mental Status: Alert, relaxed, and cooperative. Thought process coherent. Oriented to   person, place, and time.   - Cranial Nerves: Il through Xll intact.   - Motor: Good muscle bulk and tone.   - Strength 5/5 throughout.   - Cerebellar-- finger-to-nose intact bilaterally.   - Gait with normal base.   - Romberg--maintains balance with eyes closed.   - No pronator drift.    - Sensory: Sensation to light touch intact throughout        Data   Recent Labs   Lab 11/05/22  0859 11/04/22  1148 11/04/22  0526 11/03/22  0614 11/02/22  0615 11/02/22  0544 11/01/22  1229 11/01/22  0306 11/01/22  0305   WBC  --   --   --   --  9.4  --  14.0* 10.1  --    HGB  --   --   --   --  13.3  --  14.4 14.3  --    MCV  --   --   --   --  89  --  89 87  --    PLT  --   --   --   --  161  --  183 219  --    NA  --   --   --   --   --  141 143  --  142   POTASSIUM 3.8 3.9 3.4   < >  --  3.7 4.0  --  2.9*   CHLORIDE  --   --   --   --   --  114* 112*  --  110*   CO2  --   --   --   --   --  20 25  --  24   BUN  --   --   --   --   --  10 12  --  19   CR  --   --   --   --   --  0.84 0.97  --  0.86   ANIONGAP  --   --   --   --   --  7 6  --  8   LAKHWINDER  --   --   --   --   --  7.9* 8.5  --  8.5   GLC  --   --   --   --   --  120* 148*  --  147*   ALBUMIN  --   --   --   --   --  3.2* 3.8  --  4.3   PROTTOTAL  --   --   --   --   --  6.5* 7.3  --  7.3   BILITOTAL  --   --   --   --   --  0.3 0.3  --  0.4   ALKPHOS  --    --   --   --   --  59 72  --  74   ALT  --   --   --   --   --  16 23  --  20   AST  --   --   --   --   --  20 22  --  23   LIPASE  --   --   --   --   --   --   --   --  119    < > = values in this interval not displayed.     No results found for this or any previous visit (from the past 24 hour(s)).  Medications       cefTRIAXone  2 g Intravenous Q12H     enoxaparin ANTICOAGULANT  40 mg Subcutaneous Q24H     sodium chloride (PF)  3 mL Intracatheter Q8H

## 2022-11-06 LAB
BACTERIA BLD CULT: NO GROWTH
BACTERIA BLD CULT: NO GROWTH
BACTERIA CSF CULT: NO GROWTH
GRAM STAIN RESULT: NORMAL
GRAM STAIN RESULT: NORMAL
MAGNESIUM SERPL-MCNC: 2.1 MG/DL (ref 1.6–2.3)
POTASSIUM BLD-SCNC: 4.1 MMOL/L (ref 3.4–5.3)

## 2022-11-06 PROCEDURE — 87449 NOS EACH ORGANISM AG IA: CPT | Performed by: INTERNAL MEDICINE

## 2022-11-06 PROCEDURE — 250N000011 HC RX IP 250 OP 636: Performed by: INTERNAL MEDICINE

## 2022-11-06 PROCEDURE — 120N000001 HC R&B MED SURG/OB

## 2022-11-06 PROCEDURE — 99232 SBSQ HOSP IP/OBS MODERATE 35: CPT | Performed by: INTERNAL MEDICINE

## 2022-11-06 PROCEDURE — 36415 COLL VENOUS BLD VENIPUNCTURE: CPT | Performed by: INTERNAL MEDICINE

## 2022-11-06 PROCEDURE — 83735 ASSAY OF MAGNESIUM: CPT | Performed by: INTERNAL MEDICINE

## 2022-11-06 PROCEDURE — 87385 HISTOPLASMA CAPSUL AG IA: CPT | Performed by: INTERNAL MEDICINE

## 2022-11-06 PROCEDURE — 86481 TB AG RESPONSE T-CELL SUSP: CPT | Performed by: INTERNAL MEDICINE

## 2022-11-06 PROCEDURE — 84132 ASSAY OF SERUM POTASSIUM: CPT | Performed by: INTERNAL MEDICINE

## 2022-11-06 PROCEDURE — 87305 ASPERGILLUS AG IA: CPT | Performed by: INTERNAL MEDICINE

## 2022-11-06 RX ADMIN — ENOXAPARIN SODIUM 40 MG: 40 INJECTION SUBCUTANEOUS at 21:32

## 2022-11-06 RX ADMIN — CEFTRIAXONE SODIUM 2 G: 2 INJECTION, POWDER, FOR SOLUTION INTRAMUSCULAR; INTRAVENOUS at 10:01

## 2022-11-06 RX ADMIN — CEFTRIAXONE SODIUM 2 G: 2 INJECTION, POWDER, FOR SOLUTION INTRAMUSCULAR; INTRAVENOUS at 21:33

## 2022-11-06 ASSESSMENT — ACTIVITIES OF DAILY LIVING (ADL)
ADLS_ACUITY_SCORE: 18

## 2022-11-06 NOTE — PROGRESS NOTES
[facility]    Medicine Progress Note - Hospitalist Service    Date of Admission:  11/1/2022    Assessment & Plan   Hardik Humphrey is a 32-year-old male history of meningitis (presumed bacterial) in May 2022 now presenting with acute onset intractable headache, fever, chills, nausea and vomiting with abnormal CSF analysis obtained on current admission; overall clinical picture highly suspicious for bacterial meningitis.         Weekend update:  Presenting symptoms including headache, neck pain, nausea and vomiting of all since resolved.  Patient has remained on ceftriaxone 2 g every 12 hours.  Patient hoping to be discharged on Monday and is amenable to visit outpatient infusion center to complete antibiotic regimen.  Of note, HIV, Lyme and syphilis are negative.  Discussed case with DONI vivar recommending further testing for endemic fungal and TB with QuantiFERON gold.  ID also recommending 14 days of ceftriaxone 2 g twice daily.  MRI was also suggested, though utility in the setting of recent LP may be compromised due to iatrogenic meningeal enhancements      Likely discharge home tomorrow 11/7 if able to complete outpatient antibiotic regimen by visiting outpatient infusion center.          A/P:   #Headache - resolved  #Sepsis, likely due to meningitis presumed bacterial  #Abnormal CSF analysis concerning for bacterial meningitis  #Leukocytosis, with left shift  #Hypokalemia-resolved  Patient presenting with acute onset headache, fever, chills, nausea and vomiting.  No history of abnormal CSF in May 2022, though CSF culture during the time showed negative bacterial culture, testing for streptococcal pneumonia, cryptococcus, viral panel was negative during that admission.   CSF analysis on this admission however, does show 797 total nucleated cells (84% neutrophils), 10 RBC, glucose 68, total protein 198.  The predominance of PMNs shortness supports bacterial process for  meningitis.  In the ED, patient has received multiple IV fluid bolus.  Patient received empiric treatment with acyclovir, ceftriaxone total of 2 g,  Decadron 10 mg IV x1, and also started on vancomycin.  For headache, patient has been receiving oxycodone and also getting Compazine as an antiemetic.  -Positive West Nile IgG but negative West Nile IgM (which may represent past infection)  -Acyclovir and vancomycin has since been discontinued.  Dexamethasone discontinued after x1 dose.    -Patient's presenting symptoms resolved since being managed with ceftriaxone   Patient denies any recent travel history or any sick contacts.  He denies any recreational drug use.  Patient does not drink alcohol or use any IV recreational drugs.    Continue empiric treatment for meningitis with    Cont Ceftriaxone 2 g twice daily for 14 days treatment    Sent karius test to [place]    Follow-up quant gold, endemic fungal studies      For headache, nausea continue:    As needed Tylenol, Compazine    Diet: Combination Diet Regular Diet Adult    DVT Prophylaxis: Enoxaparin (Lovenox) SQ  Reinoso Catheter: Not present  Central Lines: None  Cardiac Monitoring: None  Code Status: Full Code      Disposition Plan     Expected Discharge Date: 11/09/2022,  9:00 AM  Discharge Delays: IV Medication - consider oral or Home Infusion    Discharge Comments: needs to complete IV ABX course.    Inpatient level of care requiring IV antibiotics for presumed bacterial meningitis.  Overall symptoms continues to improve.     The patient's care was discussed with the Patient.    RAJWINDER TATE MD  Hospitalist Service  formerly [facility]  Securely message with the Vocera Web Console (learn more here)  Text page via [employer] Paging/Directory         Clinically Significant Risk Factors              # Hypoalbuminemia: Lowest albumin = 3.2 g/dL (Ref range: 3.5-5.2) at 11/2/2022  5:44 AM, will monitor as appropriate                    ______________________________________________________________________      Data reviewed today: I reviewed all medications, new labs and imaging results over the last 24 hours.      Physical Exam   Vital Signs: Temp: 97.1  F (36.2  C) Temp src: Oral BP: 97/65 Pulse: 74   Resp: 16 SpO2: 95 % O2 Device: None (Room air)    Weight: 159 lbs 0 oz      General appearance: Alert, in no acute distress and Ox3   HEENT: Normocephalic, atraumatic; Pupils are equal, round and react to light; Extraocular   movements intact; Conjuctivae are Normal; Mucous membranes moist and without lesions   Pulm: clear to ausculation bilaterally, no wheeze, rales or rhonchi   CVS: RRR, no m/r/g   GI: Abdomen soft, non-tender throughout, normoactive bowel sounds and No rebound or guarding   Extremities: No cyanosis, clubbing or edema   Skin: Large area of erythema in right side of back.   Neurologic:   - Mental Status: Alert, relaxed, and cooperative. Thought process coherent. Oriented to   person, place, and time.   - Strength 5/5 throughout.         Data   Recent Labs   Lab 11/06/22  0547 11/05/22  0859 11/04/22  1148 11/03/22  0614 11/02/22  0615 11/02/22  0544 11/01/22  1229 11/01/22  0306 11/01/22  0305   WBC  --   --   --   --  9.4  --  14.0* 10.1  --    HGB  --   --   --   --  13.3  --  14.4 14.3  --    MCV  --   --   --   --  89  --  89 87  --    PLT  --   --   --   --  161  --  183 219  --    NA  --   --   --   --   --  141 143  --  142   POTASSIUM 4.1 3.8 3.9   < >  --  3.7 4.0  --  2.9*   CHLORIDE  --   --   --   --   --  114* 112*  --  110*   CO2  --   --   --   --   --  20 25  --  24   BUN  --   --   --   --   --  10 12  --  19   CR  --   --   --   --   --  0.84 0.97  --  0.86   ANIONGAP  --   --   --   --   --  7 6  --  8   LAKHWINDER  --   --   --   --   --  7.9* 8.5  --  8.5   GLC  --   --   --   --   --  120* 148*  --  147*   ALBUMIN  --   --   --   --   --  3.2* 3.8  --  4.3   PROTTOTAL  --   --   --   --   --  6.5* 7.3  --  7.3    BILITOTAL  --   --   --   --   --  0.3 0.3  --  0.4   ALKPHOS  --   --   --   --   --  59 72  --  74   ALT  --   --   --   --   --  16 23  --  20   AST  --   --   --   --   --  20 22  --  23   LIPASE  --   --   --   --   --   --   --   --  119    < > = values in this interval not displayed.     No results found for this or any previous visit (from the past 24 hour(s)).  Medications       cefTRIAXone  2 g Intravenous Q12H     enoxaparin ANTICOAGULANT  40 mg Subcutaneous Q24H     sodium chloride (PF)  3 mL Intracatheter Q8H

## 2022-11-06 NOTE — TELECONSULT
Infectious Disease Curbside Note  I was called by Dr. Stevens on 11/6/2022 at 1:45 PM to provide input for David Huang MRN [id number]. The patient is located at [facility]. The nature of this request for a curbside consultation does not permit me to perform a comprehensive review of health care records, patient/family interview, nor an examination of the patient. I obtained limited patient information from the provider on the phone call and a limited chart review.    Based on only the information I was provided today, I make the following recommendations to the treating provider/team for their review and consideration:     David Huang is a 32 year old male with second bout of bacterial meningitis first one diagnosed in May 2022. Initially treated for 7 days. HIV negative, lyme negative, syphilis negative. Consider expanding workup to include endemic fungal and TB (quantiferon). Consider MRI. Treatment duration is 2 weeks of ceftriaxone 2g 2x a day.     These recommendations are not intended to take the place of the care team's clinical judgement, which should always be utilized to provide the most appropriate care to meet the unique needs of each patient. The recommendations offered were based on the limited scope of information provided as today's date. Should additional guidance be needed or required a formal consultation with infectious diseases is recommended.    *If a patient is discharged on IV antibiotics it is not the responsibility of the ID curbside provider to monitor labs or sign for antibiotic orders unless it is otherwise stated. If further outpatient management is required then an outpatient ID consult should be placed.

## 2022-11-06 NOTE — PLAN OF CARE
Goal Outcome Evaluation:      Plan of Care Reviewed With: patient    Overall Patient Progress: improvingOverall Patient Progress: improving    Outcome Evaluation: Vital signs stable. Denying any pain. Neuros are WDL. Lumbar puncture site remains CDI. Patient independent in room.

## 2022-11-07 VITALS
RESPIRATION RATE: 16 BRPM | DIASTOLIC BLOOD PRESSURE: 70 MMHG | BODY MASS INDEX: 24.96 KG/M2 | TEMPERATURE: 96.8 F | HEART RATE: 80 BPM | HEIGHT: 67 IN | SYSTOLIC BLOOD PRESSURE: 111 MMHG | OXYGEN SATURATION: 98 % | WEIGHT: 159 LBS

## 2022-11-07 PROBLEM — R11.2 NAUSEA WITH VOMITING: Status: ACTIVE | Noted: 2022-11-07

## 2022-11-07 PROBLEM — E88.09 HYPOALBUMINEMIA: Status: ACTIVE | Noted: 2022-11-07

## 2022-11-07 LAB
BASOPHILS # BLD AUTO: 0.1 10E3/UL (ref 0–0.2)
BASOPHILS NFR BLD AUTO: 1 %
EOSINOPHIL # BLD AUTO: 0.2 10E3/UL (ref 0–0.7)
EOSINOPHIL NFR BLD AUTO: 4 %
ERYTHROCYTE [DISTWIDTH] IN BLOOD BY AUTOMATED COUNT: 12.1 % (ref 10–15)
HCT VFR BLD AUTO: 47.6 % (ref 40–53)
HGB BLD-MCNC: 16.2 G/DL (ref 13.3–17.7)
HOLD SPECIMEN: NORMAL
IMM GRANULOCYTES # BLD: 0 10E3/UL
IMM GRANULOCYTES NFR BLD: 0 %
LYMPHOCYTES # BLD AUTO: 2.1 10E3/UL (ref 0.8–5.3)
LYMPHOCYTES NFR BLD AUTO: 39 %
MAGNESIUM SERPL-MCNC: 2.1 MG/DL (ref 1.6–2.3)
MCH RBC QN AUTO: 30.3 PG (ref 26.5–33)
MCHC RBC AUTO-ENTMCNC: 34 G/DL (ref 31.5–36.5)
MCV RBC AUTO: 89 FL (ref 78–100)
MONOCYTES # BLD AUTO: 0.5 10E3/UL (ref 0–1.3)
MONOCYTES NFR BLD AUTO: 9 %
NEUTROPHILS # BLD AUTO: 2.6 10E3/UL (ref 1.6–8.3)
NEUTROPHILS NFR BLD AUTO: 47 %
NRBC # BLD AUTO: 0 10E3/UL
NRBC BLD AUTO-RTO: 0 /100
PLAT MORPH BLD: NORMAL
PLATELET # BLD AUTO: 206 10E3/UL (ref 150–450)
POTASSIUM BLD-SCNC: 3.6 MMOL/L (ref 3.4–5.3)
RBC # BLD AUTO: 5.35 10E6/UL (ref 4.4–5.9)
RBC MORPH BLD: NORMAL
WBC # BLD AUTO: 5.5 10E3/UL (ref 4–11)

## 2022-11-07 PROCEDURE — 85025 COMPLETE CBC W/AUTO DIFF WBC: CPT | Performed by: INTERNAL MEDICINE

## 2022-11-07 PROCEDURE — 84132 ASSAY OF SERUM POTASSIUM: CPT | Performed by: INTERNAL MEDICINE

## 2022-11-07 PROCEDURE — 99239 HOSP IP/OBS DSCHRG MGMT >30: CPT | Performed by: PEDIATRICS

## 2022-11-07 PROCEDURE — 83735 ASSAY OF MAGNESIUM: CPT | Performed by: INTERNAL MEDICINE

## 2022-11-07 PROCEDURE — 36569 INSJ PICC 5 YR+ W/O IMAGING: CPT

## 2022-11-07 PROCEDURE — 250N000011 HC RX IP 250 OP 636: Performed by: INTERNAL MEDICINE

## 2022-11-07 PROCEDURE — 272N000432 HC KIT CATH IV 18 OR 20 G, POWERGLIDE BASIC

## 2022-11-07 PROCEDURE — 36415 COLL VENOUS BLD VENIPUNCTURE: CPT | Performed by: INTERNAL MEDICINE

## 2022-11-07 RX ORDER — ALBUTEROL SULFATE 90 UG/1
1-2 AEROSOL, METERED RESPIRATORY (INHALATION)
Status: CANCELLED
Start: 2022-11-08

## 2022-11-07 RX ORDER — METHYLPREDNISOLONE SODIUM SUCCINATE 125 MG/2ML
125 INJECTION, POWDER, LYOPHILIZED, FOR SOLUTION INTRAMUSCULAR; INTRAVENOUS
Status: CANCELLED
Start: 2022-11-08

## 2022-11-07 RX ORDER — LIDOCAINE 40 MG/G
CREAM TOPICAL
Status: DISCONTINUED | OUTPATIENT
Start: 2022-11-07 | End: 2022-11-07 | Stop reason: HOSPADM

## 2022-11-07 RX ORDER — HEPARIN SODIUM (PORCINE) LOCK FLUSH IV SOLN 100 UNIT/ML 100 UNIT/ML
5 SOLUTION INTRAVENOUS
Status: CANCELLED | OUTPATIENT
Start: 2022-11-08

## 2022-11-07 RX ORDER — CEFTRIAXONE 2 G/1
2 INJECTION, POWDER, FOR SOLUTION INTRAMUSCULAR; INTRAVENOUS EVERY 12 HOURS
Qty: 320 ML | Refills: 0 | COMMUNITY
Start: 2022-11-08

## 2022-11-07 RX ORDER — EPINEPHRINE 1 MG/ML
0.3 INJECTION, SOLUTION INTRAMUSCULAR; SUBCUTANEOUS EVERY 5 MIN PRN
Status: CANCELLED | OUTPATIENT
Start: 2022-11-08

## 2022-11-07 RX ORDER — CEFTRIAXONE 2 G/1
2 INJECTION, POWDER, FOR SOLUTION INTRAMUSCULAR; INTRAVENOUS EVERY 12 HOURS
Status: CANCELLED
Start: 2022-11-09

## 2022-11-07 RX ORDER — ALBUTEROL SULFATE 0.83 MG/ML
2.5 SOLUTION RESPIRATORY (INHALATION)
Status: CANCELLED | OUTPATIENT
Start: 2022-11-08

## 2022-11-07 RX ORDER — MEPERIDINE HYDROCHLORIDE 25 MG/ML
25 INJECTION INTRAMUSCULAR; INTRAVENOUS; SUBCUTANEOUS EVERY 30 MIN PRN
Status: CANCELLED | OUTPATIENT
Start: 2022-11-08

## 2022-11-07 RX ORDER — DIPHENHYDRAMINE HYDROCHLORIDE 50 MG/ML
50 INJECTION INTRAMUSCULAR; INTRAVENOUS
Status: CANCELLED
Start: 2022-11-08

## 2022-11-07 RX ORDER — HEPARIN SODIUM,PORCINE 10 UNIT/ML
5 VIAL (ML) INTRAVENOUS
Status: CANCELLED | OUTPATIENT
Start: 2022-11-08

## 2022-11-07 RX ORDER — IBUPROFEN 200 MG
400 TABLET ORAL EVERY 4 HOURS PRN
COMMUNITY
Start: 2022-11-07

## 2022-11-07 RX ADMIN — CEFTRIAXONE SODIUM 2 G: 2 INJECTION, POWDER, FOR SOLUTION INTRAMUSCULAR; INTRAVENOUS at 19:12

## 2022-11-07 RX ADMIN — CEFTRIAXONE SODIUM 2 G: 2 INJECTION, POWDER, FOR SOLUTION INTRAMUSCULAR; INTRAVENOUS at 09:04

## 2022-11-07 ASSESSMENT — ACTIVITIES OF DAILY LIVING (ADL)
ADLS_ACUITY_SCORE: 18

## 2022-11-07 NOTE — PROGRESS NOTES
S-(situation): Patient discharged to [address] via vehicle with patient's friend.     B-(background): Bacterial Meningitis     A-(assessment): VSS. Lungs are clear and equal bilaterally. Denies pain. Nueros are intact. Peripheral midline IV  Placed in left arm for IV antibiotics after discharge.     R-(recommendations): Discharge instructions reviewed with patient and patient's friend Xin. Listed belongings gathered and returned to patient.          Discharge Nursing Criteria:     Care Plan and Patient education resolved: Yes    New Medications- pt has been educated about purpose and side effects: Yes    Vaccines  Influenza status verified at discharge:  Yes    Intentionally Retained Items (examples: Drains, Wound packing, ureteral stents, clarke, PICC line or IV)  Patient was sent home with Midline peripheral midline, left in place.   Follow up appointment made for removal: No- Infusion therapy through 11/15.     MISC  Prescriptions if needed, hard copies sent with patient  NA  Home medications returned to patient: NA  Medication Bin checked and emptied on discharge Yes  Patient reports post-discharge pain management plan is effective: Yes

## 2022-11-07 NOTE — PLAN OF CARE
Goal Outcome Evaluation:      Plan of Care Reviewed With: patient    Overall Patient Progress: improvingOverall Patient Progress: improving    Outcome Evaluation: Vital signs stable. Denies pain. Neuros WDL. Patient independent in room. IV rochepin.

## 2022-11-07 NOTE — DISCHARGE SUMMARY
[facility]  Hospitalist Discharge Summary      Date of Admission:  11/1/2022  Date of Discharge:  11/7/2022  Discharging Provider: Leo Bryan MD  Discharge Service: Hospitalist Service    Discharge Diagnoses   Principal Problem:    Bacterial meningitis  Active Problems:    Sepsis (H)    Hypokalemia    Hypoalbuminemia    Nausea with vomiting    Follow-ups Needed After Discharge   Follow-up Appointments     Follow-up and recommended labs and tests       Follow up with primary care provider within 7 days for hospital follow-   up.           Unresulted Labs Ordered in the Past 30 Days of this Admission     Date and Time Order Name Status Description    11/6/2022  6:23 PM Aspergillus Galactomannan Antigen In process     11/6/2022  6:04 PM Quantiferon TB Gold Plus Purple Tube In process     11/6/2022  6:04 PM Quantiferon TB Gold Plus Yellow Tube In process     11/6/2022  6:04 PM Quantiferon TB Gold Plus Green Tube In process     11/6/2022  6:04 PM Quantiferon TB Gold Plus Grey Tube In process     11/6/2022  6:00 PM Coccidioides Agn Quant EIA Blood In process     11/6/2022  6:00 PM Blastomyces Agn Quant EIA Blood In process     11/6/2022  5:57 PM Histoplasma Capsulatum Antigen In process     11/1/2022  2:00 PM Anaerobic CSF culture Preliminary       These results will be followed up by PCP    Discharge Disposition   Discharged to home  Condition at discharge: Stable    Hospital Course   32-year-old man with history of meningitis in May 2022 presented with 1 day history of worsening headache, nausea, vomiting, fever, and chills.  Due to concern for meningitis, he was admitted.    Meningitis with sepsis  Clinical presentation was concerning for meningitis.  He presented with fever and leukocytosis concerning for SIRS.  In the context of infection, sepsis was suspected although he did not have clear signs of organ dysfunction.  CSF WBC count was 797 with 84% neutrophils.  Gram stain was  negative for organisms.  CSF bacterial culture was negative.  CSF testing for HSV was negative.  CSF PCR testing for pathogens was negative.  Lyme antibody CSF testing was negative.  West Nile virus CSF IgG was elevated, but West Nile virus CSF IgM was low arguing against acute West Nile virus infection.  HIV antigen antibody combo testing was nonreactive.  Case was discussed by telephone with infectious disease who advised additional diagnostic evaluation to include fungal serologies and TB QuantiFERON testing.  Those test results were pending at the time of hospital discharge.  He was treated empirically with IV ceftriaxone.  He improved with resolution of all symptoms of meningitis.  At discharge, he was advised to complete a 14-day treatment course of IV ceftriaxone 2 g every 12 hours.  Arrangements were made for him to return to the hospital twice daily to complete that treatment course.    Nausea with vomiting  Hypokalemia  Hypoalbuminemia  He presented with nausea and recurrent vomiting.  He was treated with antiemetics and IV fluids.  As meningitis was treated, nausea and vomiting resolved.  He was hypokalemic at admission and received potassium supplementation.  As oral intake advanced, he maintained normal potassium levels by discharge.  He was also hypoalbuminemic at admission.  Both hypokalemia and hypoalbuminemia were attributed to decreased oral nutritional intake recently because of recurrent vomiting associated with meningitis.    Consultations This Hospital Stay   PHARMACY TO [facility]  NEUROLOGY IP CONSULT  VASCULAR ACCESS ADULT IP CONSULT    Code Status   Full Code    Time Spent on this Encounter   I, Leo Bryan MD, personally saw the patient today and spent greater than 30 minutes discharging this patient.       Leo Bryan MD  [facility] 2A MEDICAL SURGICAL  [address] [place] [address]  Phone:  [phone]  ______________________________________________________________________    Physical Exam   Vital Signs: Temp: 96.8  F (36  C) Temp src: Oral BP: 111/70 Pulse: 80   Resp: 16 SpO2: 98 % O2 Device: None (Room air)    Weight: 159 lbs 0 oz  General Appearance: Young man who does not appear to be in any distress while resting in bed  Other: Alert and maintains wakefulness and attention, no limitations in active range of motion of his head and neck       Primary Care Physician   Physician No Ref-Primary    Discharge Orders      Infectious Disease Referral      Reason for your hospital stay    Hospitalized due to infection around brain (meningitis) and improved     Follow-up and recommended labs and tests     Follow up with primary care provider within 7 days for hospital follow- up.     Activity    Your activity upon discharge: activity as tolerated     Diet    Follow this diet upon discharge: Orders Placed This Encounter      Combination Diet Regular Diet Adult       Significant Results and Procedures   Most Recent 3 CBC's:Recent Labs   Lab Test 11/07/22  0535 11/02/22  0615 11/01/22  1229   WBC 5.5 9.4 14.0*   HGB 16.2 13.3 14.4   MCV 89 89 89    161 183     Most Recent 3 BMP's:Recent Labs   Lab Test 11/07/22  0535 11/06/22  0547 11/05/22  0859 11/03/22  0614 11/02/22  0544 11/01/22  1229 11/01/22  0305   NA  --   --   --   --  141 143 142   POTASSIUM 3.6 4.1 3.8   < > 3.7 4.0 2.9*   CHLORIDE  --   --   --   --  114* 112* 110*   CO2  --   --   --   --  20 25 24   BUN  --   --   --   --  10 12 19   CR  --   --   --   --  0.84 0.97 0.86   ANIONGAP  --   --   --   --  7 6 8   LAKHWINDER  --   --   --   --  7.9* 8.5 8.5   GLC  --   --   --   --  120* 148* 147*    < > = values in this interval not displayed.     Most Recent 2 LFT's:Recent Labs   Lab Test 11/02/22  0544 11/01/22  1229   AST 20 22   ALT 16 23   ALKPHOS 59 72   BILITOTAL 0.3 0.3     7-Day Micro Results     Collected Updated Procedure Result Status       11/06/2022 1810 11/06/2022 1846 Histoplasma Capsulatum Antigen [[id number]]   Blood from Arm, Right    In process Component Value   No component results            11/06/2022 1810 11/06/2022 1846 Blastomyces Agn Quant EIA Blood [[id number]]   Blood from Arm, Right    In process Component Value   No component results            11/06/2022 1810 11/06/2022 1846 Coccidioides Agn Quant EIA Blood [[id number]]   Blood from Arm, Right    In process Component Value   No component results            11/06/2022 1810 11/06/2022 1816 Quantiferon TB Gold Plus Grey Tube [[id number]]   Blood from Arm, Right    In process Component Value   No component results            11/06/2022 1810 11/06/2022 1814 Quantiferon TB Gold Plus Green Tube [[id number]]   Blood from Arm, Right    In process Component Value   No component results            11/06/2022 1810 11/06/2022 1814 Quantiferon TB Gold Plus Yellow Tube [[id number]]   Blood from Arm, Right    In process Component Value   No component results            11/06/2022 1810 11/06/2022 1814 Quantiferon TB Gold Plus Purple Tube [[id number]]   Blood from Arm, Right    In process Component Value   No component results            11/06/2022 1810 11/06/2022 1846 Aspergillus Galactomannan Antigen [[id number]]   Blood from Arm, Right    In process Component Value   No component results            11/01/2022 1615 11/06/2022 0635 Cerebrospinal fluid Aerobic Bacterial Culture Routine [[id number]]    Cerebrospinal fluid from Lumbar Puncture    Final result Component Value   Culture No Growth   Gram Stain Result No organisms seen    4+ WBC seen    Predominantly PMNs               11/01/2022 1615 11/01/2022 1701 CSF Cell Count with Differential: [[id number]]    (Abnormal)   Cerebrospinal fluid from Lumbar Puncture    Final result Component Value   No component results            11/01/2022 1615 11/01/2022 2329 Meningitis/Encephalitis Panel Qual PCR CSF: [[id number]]     Cerebrospinal fluid from Lumbar Puncture    Final result Component Value   Escherichia coli K1 Negative   Haemophilus influenzae Negative   Listeria monocytogenes Negative   Neisseria meningitidis Negative   Streptococcus agalactiae (GBS) Negative   Streptococcus pneumoniae Negative   Cytomegalovirus Negative   Enterovirus Negative   Herpes simplex virus 1 Negative   Recommend testing with another molecular method if clinical suspicion for infection is high.   Herpes simplex virus 2 Negative   Recommend testing with another molecular method if clinical suspicion for infection is high.   Human Herpes Virus 6 Negative   Human parechovirus Negative   Varicella zoster virus Negative   Cryptococcus neoformans/gattii Negative   Recommend testing for Cryptococcal antigen and fungal culture if clinical suspicion for infection is high.            11/01/2022 1615 11/01/2022 2329 HSV Types 1 and 2 Qualitative PCR CSF [[id number]]    Cerebrospinal fluid from Lumbar Puncture    Final result Component Value   Herpes Simplex Virus 1 DNA - CSF Not Detected   Herpes simplex virus type 1 DNA not detected, presumed negative for HSV-1. A negative result does not rule out the presence of PCR inhibitors or HSV DNA in concentrations below the limit of detection.   Herpes Simplex Virus 2 DNA - CSF Not Detected   Herpes simplex virus type 2 DNA not detected, presumed negative for HSV-2. A negative result does not rule out the presence of PCR inhibitors or HSV DNA in concentrations below the limit of detection.            11/01/2022 1615 11/06/2022 2116 Anaerobic CSF culture [[id number]]    Cerebrospinal fluid from Lumbar Puncture    Preliminary result Component Value   Culture No anaerobic organisms isolated after 5 days  [P]                11/01/2022 1615 11/01/2022 1700 Cell Count CSF [[id number]]   (Abnormal)   Cerebrospinal fluid from Lumbar Puncture    Final result Component Value Units   Tube Number 1    Color Colorless     Clarity Hazy    Total Nucleated Cells 797 /uL   RBC Count 10 /uL            11/01/2022 1615 11/01/2022 1701 Differential CSF [[id number]]    Cerebrospinal fluid from Lumbar Puncture    Final result Component Value Units   % Neutrophils 84 %   % Lymphocytes 1 %   % Monocytes/Macrophages 15 %            11/01/2022 1436 11/01/2022 1453 Streptococcus A Rapid Screen w/Reflex to PCR [[id number]]   Swab from Throat    Final result Component Value   Group A Strep antigen Negative            11/01/2022 1436 11/01/2022 2227 Group A Streptococcus PCR Throat Swab [[id number]]    Swab from Throat    Final result Component Value   Group A strep by PCR Not Detected            11/01/2022 1433 11/06/2022 1916 Blood Culture Peripheral Blood [[id number]]   Peripheral Blood    Final result Component Value   Culture No Growth               11/01/2022 1433 11/06/2022 1916 Blood Culture Peripheral Blood [[id number]]    Peripheral Blood    Final result Component Value   Culture No Growth               11/01/2022 0306 11/01/2022 0348 Symptomatic; Yes; 10/30/2022 Influenza A/B & SARS-CoV2 (COVID-19) Virus PCR Multiplex Nasopharyngeal [[id number]]    Swab from Nasopharyngeal    Final result Component Value   Influenza A PCR Negative   Influenza B PCR Negative   RSV PCR Negative   SARS CoV2 PCR Negative   NEGATIVE: SARS-CoV-2 (COVID-19) RNA not detected, presumed negative.              ,   Results for orders placed or performed during the hospital encounter of 05/19/22   CT Head w/o Contrast    Narrative    EXAM: CT HEAD W/O CONTRAST  LOCATION: [facility]  DATE/TIME: 5/19/2022 6:41 PM    INDICATION: Severe headache  COMPARISON: None.  TECHNIQUE: Routine CT Head without IV contrast. Multiplanar reformats. Dose reduction techniques were used.    FINDINGS:  INTRACRANIAL CONTENTS: No intracranial hemorrhage, extraaxial collection, or mass effect.  No CT evidence of acute infarct. Normal parenchymal  attenuation. Normal ventricles and sulci.     VISUALIZED ORBITS/SINUSES/MASTOIDS: No intraorbital abnormality. No paranasal sinus mucosal disease. No middle ear or mastoid effusion.    BONES/SOFT TISSUES: No acute abnormality.      Impression    IMPRESSION:  1.  No acute intracranial process.       Discharge Medications   Current Discharge Medication List      START taking these medications    Details   cefTRIAXone (ROCEPHIN) 2 GM vial Inject 2 g into the vein every 12 hours  Qty: 320 mL, Refills: 0         CONTINUE these medications which have CHANGED    Details   ibuprofen (ADVIL/MOTRIN) 200 MG tablet Take 2 tablets (400 mg) by mouth every 4 hours as needed for pain    Associated Diagnoses: Bacterial meningitis         STOP taking these medications       Acetaminophen (TYLENOL PO) Comments:   Reason for Stopping:         NAPROXEN PO Comments:   Reason for Stopping:         ondansetron (ZOFRAN ODT) 4 MG ODT tab Comments:   Reason for Stopping:         SUMAtriptan (IMITREX) 25 MG tablet Comments:   Reason for Stopping:             Allergies   No Known Allergies

## 2022-11-08 ENCOUNTER — HOSPITAL ENCOUNTER (EMERGENCY)
Facility: CLINIC | Age: 32
Discharge: HOME | End: 2022-11-08
Admitting: PEDIATRICS

## 2022-11-08 ENCOUNTER — INFUSION THERAPY VISIT (OUTPATIENT)
Dept: INFUSION THERAPY | Facility: CLINIC | Age: 32
DRG: 871 | End: 2022-11-08
Attending: PEDIATRICS

## 2022-11-08 ENCOUNTER — PATIENT OUTREACH (OUTPATIENT)
Dept: CARE COORDINATION | Facility: CLINIC | Age: 32
End: 2022-11-08

## 2022-11-08 VITALS
DIASTOLIC BLOOD PRESSURE: 81 MMHG | HEART RATE: 79 BPM | RESPIRATION RATE: 16 BRPM | OXYGEN SATURATION: 100 % | SYSTOLIC BLOOD PRESSURE: 120 MMHG | TEMPERATURE: 97.9 F

## 2022-11-08 VITALS
SYSTOLIC BLOOD PRESSURE: 97 MMHG | HEART RATE: 72 BPM | WEIGHT: 155 LBS | TEMPERATURE: 97.4 F | OXYGEN SATURATION: 97 % | DIASTOLIC BLOOD PRESSURE: 68 MMHG | BODY MASS INDEX: 24.28 KG/M2 | RESPIRATION RATE: 20 BRPM

## 2022-11-08 DIAGNOSIS — G00.9 BACTERIAL MENINGITIS: Primary | ICD-10-CM

## 2022-11-08 DIAGNOSIS — G00.9 BACTERIAL MENINGITIS: ICD-10-CM

## 2022-11-08 LAB
GAMMA INTERFERON BACKGROUND BLD IA-ACNC: 7.96 IU/ML
M TB IFN-G BLD-IMP: POSITIVE
M TB IFN-G CD4+ BCKGRND COR BLD-ACNC: 2.04 IU/ML
MITOGEN IGNF BCKGRD COR BLD-ACNC: 2.04 IU/ML
MITOGEN IGNF BCKGRD COR BLD-ACNC: 2.04 IU/ML
QUANTIFERON MITOGEN: 10 IU/ML
QUANTIFERON NIL TUBE: 7.96 IU/ML
QUANTIFERON TB1 TUBE: 10 IU/ML
QUANTIFERON TB2 TUBE: 10

## 2022-11-08 PROCEDURE — 999N000104 HC STATISTIC NO CHARGE

## 2022-11-08 PROCEDURE — 250N000011 HC RX IP 250 OP 636: Performed by: PEDIATRICS

## 2022-11-08 PROCEDURE — 96365 THER/PROPH/DIAG IV INF INIT: CPT

## 2022-11-08 RX ORDER — METHYLPREDNISOLONE SODIUM SUCCINATE 125 MG/2ML
125 INJECTION, POWDER, LYOPHILIZED, FOR SOLUTION INTRAMUSCULAR; INTRAVENOUS
Status: CANCELLED
Start: 2022-11-08

## 2022-11-08 RX ORDER — HEPARIN SODIUM (PORCINE) LOCK FLUSH IV SOLN 100 UNIT/ML 100 UNIT/ML
5 SOLUTION INTRAVENOUS
Status: CANCELLED | OUTPATIENT
Start: 2022-11-08

## 2022-11-08 RX ORDER — DIPHENHYDRAMINE HYDROCHLORIDE 50 MG/ML
50 INJECTION INTRAMUSCULAR; INTRAVENOUS
Status: CANCELLED
Start: 2022-11-08

## 2022-11-08 RX ORDER — ALBUTEROL SULFATE 0.83 MG/ML
2.5 SOLUTION RESPIRATORY (INHALATION)
Status: CANCELLED | OUTPATIENT
Start: 2022-11-08

## 2022-11-08 RX ORDER — ALBUTEROL SULFATE 90 UG/1
1-2 AEROSOL, METERED RESPIRATORY (INHALATION)
Status: CANCELLED
Start: 2022-11-08

## 2022-11-08 RX ORDER — ALBUTEROL SULFATE 90 UG/1
1-2 AEROSOL, METERED RESPIRATORY (INHALATION)
Status: DISCONTINUED | OUTPATIENT
Start: 2022-11-08 | End: 2022-11-08 | Stop reason: HOSPADM

## 2022-11-08 RX ORDER — MEPERIDINE HYDROCHLORIDE 25 MG/ML
25 INJECTION INTRAMUSCULAR; INTRAVENOUS; SUBCUTANEOUS EVERY 30 MIN PRN
Status: CANCELLED | OUTPATIENT
Start: 2022-11-08

## 2022-11-08 RX ORDER — HEPARIN SODIUM,PORCINE 10 UNIT/ML
5 VIAL (ML) INTRAVENOUS
Status: CANCELLED | OUTPATIENT
Start: 2022-11-08

## 2022-11-08 RX ORDER — METHYLPREDNISOLONE SODIUM SUCCINATE 125 MG/2ML
125 INJECTION, POWDER, LYOPHILIZED, FOR SOLUTION INTRAMUSCULAR; INTRAVENOUS
Status: DISCONTINUED | OUTPATIENT
Start: 2022-11-08 | End: 2022-11-08 | Stop reason: HOSPADM

## 2022-11-08 RX ORDER — CEFTRIAXONE 2 G/1
2 INJECTION, POWDER, FOR SOLUTION INTRAMUSCULAR; INTRAVENOUS EVERY 12 HOURS
Status: DISCONTINUED | OUTPATIENT
Start: 2022-11-08 | End: 2022-11-08 | Stop reason: HOSPADM

## 2022-11-08 RX ORDER — CEFTRIAXONE 2 G/1
2 INJECTION, POWDER, FOR SOLUTION INTRAMUSCULAR; INTRAVENOUS EVERY 12 HOURS
Status: CANCELLED
Start: 2022-11-09

## 2022-11-08 RX ORDER — DIPHENHYDRAMINE HYDROCHLORIDE 50 MG/ML
50 INJECTION INTRAMUSCULAR; INTRAVENOUS
Status: DISCONTINUED | OUTPATIENT
Start: 2022-11-08 | End: 2022-11-08 | Stop reason: HOSPADM

## 2022-11-08 RX ORDER — EPINEPHRINE 1 MG/ML
0.3 INJECTION, SOLUTION INTRAMUSCULAR; SUBCUTANEOUS EVERY 5 MIN PRN
Status: DISCONTINUED | OUTPATIENT
Start: 2022-11-08 | End: 2022-11-08 | Stop reason: HOSPADM

## 2022-11-08 RX ORDER — ALBUTEROL SULFATE 0.83 MG/ML
2.5 SOLUTION RESPIRATORY (INHALATION)
Status: DISCONTINUED | OUTPATIENT
Start: 2022-11-08 | End: 2022-11-08 | Stop reason: HOSPADM

## 2022-11-08 RX ORDER — HEPARIN SODIUM,PORCINE 10 UNIT/ML
5 VIAL (ML) INTRAVENOUS
Status: DISCONTINUED | OUTPATIENT
Start: 2022-11-08 | End: 2022-11-08 | Stop reason: HOSPADM

## 2022-11-08 RX ORDER — EPINEPHRINE 1 MG/ML
0.3 INJECTION, SOLUTION INTRAMUSCULAR; SUBCUTANEOUS EVERY 5 MIN PRN
Status: CANCELLED | OUTPATIENT
Start: 2022-11-08

## 2022-11-08 RX ORDER — HEPARIN SODIUM (PORCINE) LOCK FLUSH IV SOLN 100 UNIT/ML 100 UNIT/ML
5 SOLUTION INTRAVENOUS
Status: DISCONTINUED | OUTPATIENT
Start: 2022-11-08 | End: 2022-11-08 | Stop reason: HOSPADM

## 2022-11-08 RX ORDER — MEPERIDINE HYDROCHLORIDE 25 MG/ML
25 INJECTION INTRAMUSCULAR; INTRAVENOUS; SUBCUTANEOUS EVERY 30 MIN PRN
Status: DISCONTINUED | OUTPATIENT
Start: 2022-11-08 | End: 2022-11-08 | Stop reason: HOSPADM

## 2022-11-08 RX ADMIN — CEFTRIAXONE SODIUM 2 G: 2 INJECTION, POWDER, FOR SOLUTION INTRAMUSCULAR; INTRAVENOUS at 20:18

## 2022-11-08 RX ADMIN — CEFTRIAXONE SODIUM 2 G: 2 INJECTION, POWDER, FOR SOLUTION INTRAMUSCULAR; INTRAVENOUS at 08:20

## 2022-11-08 ASSESSMENT — PAIN SCALES - GENERAL: PAINLEVEL: NO PAIN (0)

## 2022-11-08 NOTE — PROGRESS NOTES
David Starr Sal  Gender: male  : 1990  [address]  [facility] [address]  [phone] (home)     Medical Record: [id number]  Pharmacy: [facility] [address] [place] [address]  Primary Care Provider: No Ref-Primary, Physician    Parent's names are: Data Unavailable (mother) and Data Unavailable (father).      [facility]  2022     Discharge Phone Call:  Key Words/Key Times    Non-English speaking. Did not put on callback list.  John Hawley RN

## 2022-11-08 NOTE — PROGRESS NOTES
Infusion Nursing Note:  David Huang presents today for Rocephin infusion.    Patient seen by provider today: No   present during visit today: Yes, Language: [place]se.     Note: Pt here w/care giver.  Pt stated through interpretor that he couldn't find his Ibuprofen so he was taking Tylenol. Caregiver went to pharmacy while pt infusing and purchased Ibuprofen for him.   Pt to go to ED for his evening appointment/ weekend appointment x 8 days    Intravenous Access:  PICC.    Treatment Conditions:  Not Applicable.    Post Infusion Assessment:  Patient tolerated infusion without incident.  Blood return noted pre and post infusion.  Site patent and intact, free from redness, edema or discomfort.  No evidence of extravasations.     Discharge Plan:   Discharge instructions reviewed with: Patient.  Patient discharged in stable condition accompanied by: group home attendant.  Departure Mode: Ambulatory.      Shameka Denton RN

## 2022-11-08 NOTE — PROGRESS NOTES
[facility] MIDLINE  Procedure Note      Midline      David Huang  [id number]   November 7, 2022 Indication: Abx, ceftriaxone <14days           Pause for the cause: Consent for catheter placement procedure signed  Time out completed  Patient ID's verified using two distinct indicators  All necessary equipment is present   Type of line to be used: Midline catheter   Full barrier precautions used: Yes   Skin preparation: Chloraprep   Date of insertion: November 7, 2022   Device type: Single lumen, non-valved, 4.0   Catheter brand: G-mode   Lot number: [id number]   Insertion location: Left basilic vein   Method of placement: Venipuncture  MST  Ultrasound   Number of attempts: With ultrasound: 1   Without ultrasound: 0   Difficulty threading: No   Midline IV device: Dressing dry and intact  Transparent semmipermeable dressing applied  Chlorhexidine patch  Catheter securement device   Arm circumference: Adults 10 cm   Midline extremity circumference: 29 cm   Vein diameter:  6mm    Internal length: 12 cm   Midline visible catheter length: 0 cm   Total catheter length: 12 cm   Tip termination: Axilla (midline)   Method of verification: external measurement   Midline patency post placement: Positive blood return  Flushes without difficulty  Saline locked   Line flush: Line flush documented on the eMAR   Placement verified by: Registered Nurse   Catheter placed by: Selena Lizama RN   Discontinuation form initiated: No   Patient tolerance: Tolerated well  Intradermal injection   PICC Educational information to patient (Information from PICC package):  Yes   VAD flushing orders entered:  Yes     Summary:  This procedure was performed without difficulty. There were no immediate complications.       OK to use as midline, no central fluids.

## 2022-11-08 NOTE — PROGRESS NOTES
Clinic Care Coordination Contact  [facility]/Voicemail       Clinical Data: Care Coordinator Outreach  Outreach attempted x 1. Phone number is not in service. Unable to leave message on patient's voicemail with call back information and requested return call.  Plan: Care Coordinator will do no further outreaches at this time.    LARA Patterson  [phone]  [facility]

## 2022-11-09 ENCOUNTER — INFUSION THERAPY VISIT (OUTPATIENT)
Dept: INFUSION THERAPY | Facility: CLINIC | Age: 32
End: 2022-11-09
Attending: PEDIATRICS

## 2022-11-09 ENCOUNTER — HOSPITAL ENCOUNTER (EMERGENCY)
Facility: CLINIC | Age: 32
Discharge: HOME | End: 2022-11-09
Admitting: PEDIATRICS

## 2022-11-09 VITALS
RESPIRATION RATE: 16 BRPM | DIASTOLIC BLOOD PRESSURE: 63 MMHG | HEART RATE: 88 BPM | TEMPERATURE: 98.3 F | SYSTOLIC BLOOD PRESSURE: 117 MMHG | OXYGEN SATURATION: 99 %

## 2022-11-09 VITALS
TEMPERATURE: 98 F | SYSTOLIC BLOOD PRESSURE: 108 MMHG | DIASTOLIC BLOOD PRESSURE: 58 MMHG | HEART RATE: 73 BPM | OXYGEN SATURATION: 98 %

## 2022-11-09 DIAGNOSIS — G00.9 BACTERIAL MENINGITIS: Primary | ICD-10-CM

## 2022-11-09 DIAGNOSIS — G00.9 BACTERIAL MENINGITIS: ICD-10-CM

## 2022-11-09 LAB
GALACTOMANNAN AG SERPL QL IA: NEGATIVE
GALACTOMANNAN AG SPEC IA-ACNC: 0.05
SCANNED LAB RESULT: NORMAL

## 2022-11-09 PROCEDURE — 96365 THER/PROPH/DIAG IV INF INIT: CPT

## 2022-11-09 PROCEDURE — 999N000104 HC STATISTIC NO CHARGE

## 2022-11-09 PROCEDURE — 250N000011 HC RX IP 250 OP 636: Performed by: PEDIATRICS

## 2022-11-09 RX ORDER — ALBUTEROL SULFATE 90 UG/1
1-2 AEROSOL, METERED RESPIRATORY (INHALATION)
Status: DISCONTINUED | OUTPATIENT
Start: 2022-11-09 | End: 2022-11-09 | Stop reason: HOSPADM

## 2022-11-09 RX ORDER — ALBUTEROL SULFATE 0.83 MG/ML
2.5 SOLUTION RESPIRATORY (INHALATION)
Status: CANCELLED | OUTPATIENT
Start: 2022-11-09

## 2022-11-09 RX ORDER — HEPARIN SODIUM,PORCINE 10 UNIT/ML
5 VIAL (ML) INTRAVENOUS
Status: DISCONTINUED | OUTPATIENT
Start: 2022-11-09 | End: 2022-11-09 | Stop reason: HOSPADM

## 2022-11-09 RX ORDER — HEPARIN SODIUM (PORCINE) LOCK FLUSH IV SOLN 100 UNIT/ML 100 UNIT/ML
5 SOLUTION INTRAVENOUS
Status: CANCELLED | OUTPATIENT
Start: 2022-11-09

## 2022-11-09 RX ORDER — CEFTRIAXONE 2 G/1
2 INJECTION, POWDER, FOR SOLUTION INTRAMUSCULAR; INTRAVENOUS ONCE
Status: COMPLETED | OUTPATIENT
Start: 2022-11-09 | End: 2022-11-09

## 2022-11-09 RX ORDER — MEPERIDINE HYDROCHLORIDE 25 MG/ML
25 INJECTION INTRAMUSCULAR; INTRAVENOUS; SUBCUTANEOUS EVERY 30 MIN PRN
Status: CANCELLED | OUTPATIENT
Start: 2022-11-09

## 2022-11-09 RX ORDER — METHYLPREDNISOLONE SODIUM SUCCINATE 125 MG/2ML
125 INJECTION, POWDER, LYOPHILIZED, FOR SOLUTION INTRAMUSCULAR; INTRAVENOUS
Status: CANCELLED
Start: 2022-11-09

## 2022-11-09 RX ORDER — MEPERIDINE HYDROCHLORIDE 25 MG/ML
25 INJECTION INTRAMUSCULAR; INTRAVENOUS; SUBCUTANEOUS EVERY 30 MIN PRN
Status: DISCONTINUED | OUTPATIENT
Start: 2022-11-09 | End: 2022-11-09 | Stop reason: HOSPADM

## 2022-11-09 RX ORDER — ALBUTEROL SULFATE 90 UG/1
1-2 AEROSOL, METERED RESPIRATORY (INHALATION)
Status: CANCELLED
Start: 2022-11-09

## 2022-11-09 RX ORDER — ALBUTEROL SULFATE 0.83 MG/ML
2.5 SOLUTION RESPIRATORY (INHALATION)
Status: DISCONTINUED | OUTPATIENT
Start: 2022-11-09 | End: 2022-11-09 | Stop reason: HOSPADM

## 2022-11-09 RX ORDER — EPINEPHRINE 1 MG/ML
0.3 INJECTION, SOLUTION INTRAMUSCULAR; SUBCUTANEOUS EVERY 5 MIN PRN
Status: CANCELLED | OUTPATIENT
Start: 2022-11-09

## 2022-11-09 RX ORDER — DIPHENHYDRAMINE HYDROCHLORIDE 50 MG/ML
50 INJECTION INTRAMUSCULAR; INTRAVENOUS
Status: CANCELLED
Start: 2022-11-09

## 2022-11-09 RX ORDER — HEPARIN SODIUM,PORCINE 10 UNIT/ML
5 VIAL (ML) INTRAVENOUS
Status: CANCELLED | OUTPATIENT
Start: 2022-11-09

## 2022-11-09 RX ORDER — DIPHENHYDRAMINE HYDROCHLORIDE 50 MG/ML
50 INJECTION INTRAMUSCULAR; INTRAVENOUS
Status: DISCONTINUED | OUTPATIENT
Start: 2022-11-09 | End: 2022-11-09 | Stop reason: HOSPADM

## 2022-11-09 RX ORDER — HEPARIN SODIUM (PORCINE) LOCK FLUSH IV SOLN 100 UNIT/ML 100 UNIT/ML
5 SOLUTION INTRAVENOUS
Status: DISCONTINUED | OUTPATIENT
Start: 2022-11-09 | End: 2022-11-09 | Stop reason: HOSPADM

## 2022-11-09 RX ORDER — CEFTRIAXONE 2 G/1
2 INJECTION, POWDER, FOR SOLUTION INTRAMUSCULAR; INTRAVENOUS EVERY 12 HOURS
Status: CANCELLED
Start: 2022-11-10

## 2022-11-09 RX ORDER — METHYLPREDNISOLONE SODIUM SUCCINATE 125 MG/2ML
125 INJECTION, POWDER, LYOPHILIZED, FOR SOLUTION INTRAMUSCULAR; INTRAVENOUS
Status: DISCONTINUED | OUTPATIENT
Start: 2022-11-09 | End: 2022-11-09 | Stop reason: HOSPADM

## 2022-11-09 RX ORDER — CEFTRIAXONE 2 G/1
2 INJECTION, POWDER, FOR SOLUTION INTRAMUSCULAR; INTRAVENOUS EVERY 12 HOURS
Status: DISCONTINUED | OUTPATIENT
Start: 2022-11-09 | End: 2022-11-09 | Stop reason: HOSPADM

## 2022-11-09 RX ORDER — EPINEPHRINE 1 MG/ML
0.3 INJECTION, SOLUTION INTRAMUSCULAR; SUBCUTANEOUS EVERY 5 MIN PRN
Status: DISCONTINUED | OUTPATIENT
Start: 2022-11-09 | End: 2022-11-09 | Stop reason: HOSPADM

## 2022-11-09 RX ADMIN — CEFTRIAXONE SODIUM 2 G: 2 INJECTION, POWDER, FOR SOLUTION INTRAMUSCULAR; INTRAVENOUS at 08:19

## 2022-11-09 RX ADMIN — CEFTRIAXONE SODIUM 2 G: 2 INJECTION, POWDER, FOR SOLUTION INTRAMUSCULAR; INTRAVENOUS at 20:32

## 2022-11-09 ASSESSMENT — ACTIVITIES OF DAILY LIVING (ADL): ADLS_ACUITY_SCORE: 35

## 2022-11-09 ASSESSMENT — PAIN SCALES - GENERAL: PAINLEVEL: NO PAIN (0)

## 2022-11-09 NOTE — ED TRIAGE NOTES
IVO       Triage Assessment     Row Name 11/08/22 2006       Triage Assessment (Adult)    Airway WDL WDL       Respiratory WDL    Respiratory WDL WDL       Skin Circulation/Temperature WDL    Skin Circulation/Temperature WDL WDL       Cardiac WDL    Cardiac WDL WDL       Peripheral/Neurovascular WDL    Peripheral Neurovascular WDL WDL       Cognitive/Neuro/Behavioral WDL    Cognitive/Neuro/Behavioral WDL WDL

## 2022-11-09 NOTE — PROGRESS NOTES
Infusion Nursing Note:  David Huang presents today for Rocephin.    Patient seen by provider today: No   present during visit today: Not used per his request.  Use of his phone with interpretor noel utilized, per his request.     Note: pt states that all went well last evening in ED. Pt to f/u in ED tonight @ 8 pm for his q 12 hour Rocephin.     Intravenous Access:  PICC.    Treatment Conditions:  Not Applicable.    Post Infusion Assessment:  Patient tolerated infusion without incident.  Blood return noted pre and post infusion.  Site patent and intact, free from redness, edema or discomfort.  No evidence of extravasations.     Discharge Plan:   Discharge instructions reviewed with: Patient.  Patient discharged in stable condition accompanied by: .  Departure Mode: Ambulatory.      Shameka Denton RN

## 2022-11-10 ENCOUNTER — ANESTHESIA (OUTPATIENT)
Dept: EMERGENCY MEDICINE | Facility: CLINIC | Age: 32
End: 2022-11-10

## 2022-11-10 ENCOUNTER — HOSPITAL ENCOUNTER (EMERGENCY)
Facility: CLINIC | Age: 32
Discharge: HOME | End: 2022-11-10
Attending: FAMILY MEDICINE | Admitting: FAMILY MEDICINE

## 2022-11-10 ENCOUNTER — INFUSION THERAPY VISIT (OUTPATIENT)
Dept: INFUSION THERAPY | Facility: CLINIC | Age: 32
End: 2022-11-10
Attending: PEDIATRICS

## 2022-11-10 ENCOUNTER — HOSPITAL ENCOUNTER (EMERGENCY)
Facility: CLINIC | Age: 32
Discharge: HOME | End: 2022-11-10
Admitting: PHYSICIAN ASSISTANT

## 2022-11-10 ENCOUNTER — APPOINTMENT (OUTPATIENT)
Dept: CT IMAGING | Facility: CLINIC | Age: 32
End: 2022-11-10
Attending: FAMILY MEDICINE

## 2022-11-10 ENCOUNTER — VIRTUAL VISIT (OUTPATIENT)
Dept: INTERPRETER SERVICES | Facility: CLINIC | Age: 32
End: 2022-11-10

## 2022-11-10 ENCOUNTER — ANESTHESIA EVENT (OUTPATIENT)
Dept: EMERGENCY MEDICINE | Facility: CLINIC | Age: 32
End: 2022-11-10

## 2022-11-10 VITALS
SYSTOLIC BLOOD PRESSURE: 115 MMHG | TEMPERATURE: 98 F | RESPIRATION RATE: 18 BRPM | OXYGEN SATURATION: 98 % | DIASTOLIC BLOOD PRESSURE: 87 MMHG | HEART RATE: 91 BPM

## 2022-11-10 VITALS
BODY MASS INDEX: 25.59 KG/M2 | HEART RATE: 84 BPM | TEMPERATURE: 98.2 F | WEIGHT: 149.91 LBS | RESPIRATION RATE: 16 BRPM | HEIGHT: 64 IN | SYSTOLIC BLOOD PRESSURE: 110 MMHG | DIASTOLIC BLOOD PRESSURE: 68 MMHG

## 2022-11-10 VITALS
DIASTOLIC BLOOD PRESSURE: 64 MMHG | RESPIRATION RATE: 16 BRPM | OXYGEN SATURATION: 98 % | HEART RATE: 76 BPM | TEMPERATURE: 98.5 F | SYSTOLIC BLOOD PRESSURE: 104 MMHG

## 2022-11-10 DIAGNOSIS — G03.9 MENINGITIS: ICD-10-CM

## 2022-11-10 DIAGNOSIS — G00.9 BACTERIAL MENINGITIS: Primary | ICD-10-CM

## 2022-11-10 DIAGNOSIS — Z22.7 LATENT TUBERCULOSIS: ICD-10-CM

## 2022-11-10 DIAGNOSIS — G00.9 BACTERIAL MENINGITIS: ICD-10-CM

## 2022-11-10 LAB
APPEARANCE CSF: CLEAR
BASOPHILS # BLD AUTO: 0.1 10E3/UL (ref 0–0.2)
BASOPHILS NFR BLD AUTO: 1 %
COLOR CSF: COLORLESS
EOSINOPHIL # BLD AUTO: 0.1 10E3/UL (ref 0–0.7)
EOSINOPHIL NFR BLD AUTO: 1 %
ERYTHROCYTE [DISTWIDTH] IN BLOOD BY AUTOMATED COUNT: 12 % (ref 10–15)
GLUCOSE CSF-MCNC: 56 MG/DL (ref 40–70)
HCT VFR BLD AUTO: 43.6 % (ref 40–53)
HGB BLD-MCNC: 15 G/DL (ref 13.3–17.7)
HOLD SPECIMEN: NORMAL
IMM GRANULOCYTES # BLD: 0.1 10E3/UL
IMM GRANULOCYTES NFR BLD: 1 %
LYMPHOCYTES # BLD AUTO: 1.8 10E3/UL (ref 0.8–5.3)
LYMPHOCYTES NFR BLD AUTO: 31 %
MCH RBC QN AUTO: 30.7 PG (ref 26.5–33)
MCHC RBC AUTO-ENTMCNC: 34.4 G/DL (ref 31.5–36.5)
MCV RBC AUTO: 89 FL (ref 78–100)
MONOCYTES # BLD AUTO: 0.5 10E3/UL (ref 0–1.3)
MONOCYTES NFR BLD AUTO: 8 %
NEUTROPHILS # BLD AUTO: 3.5 10E3/UL (ref 1.6–8.3)
NEUTROPHILS NFR BLD AUTO: 58 %
NRBC # BLD AUTO: 0 10E3/UL
NRBC BLD AUTO-RTO: 0 /100
PLATELET # BLD AUTO: 206 10E3/UL (ref 150–450)
PROT CSF-MCNC: 93 MG/DL (ref 15–60)
RBC # BLD AUTO: 4.88 10E6/UL (ref 4.4–5.9)
RBC # CSF MANUAL: 3 /UL (ref 0–2)
TUBE # CSF: 4
WBC # BLD AUTO: 6 10E3/UL (ref 4–11)
WBC # CSF MANUAL: 1 /UL (ref 0–5)

## 2022-11-10 PROCEDURE — T1013 SIGN LANG/ORAL INTERPRETER: HCPCS | Mod: U4,TEL

## 2022-11-10 PROCEDURE — 89050 BODY FLUID CELL COUNT: CPT | Performed by: FAMILY MEDICINE

## 2022-11-10 PROCEDURE — 99284 EMERGENCY DEPT VISIT MOD MDM: CPT | Performed by: FAMILY MEDICINE

## 2022-11-10 PROCEDURE — 96365 THER/PROPH/DIAG IV INF INIT: CPT

## 2022-11-10 PROCEDURE — 85025 COMPLETE CBC W/AUTO DIFF WBC: CPT | Performed by: FAMILY MEDICINE

## 2022-11-10 PROCEDURE — 96365 THER/PROPH/DIAG IV INF INIT: CPT | Performed by: PHYSICIAN ASSISTANT

## 2022-11-10 PROCEDURE — 84311 SPECTROPHOTOMETRY: CPT | Performed by: FAMILY MEDICINE

## 2022-11-10 PROCEDURE — 87116 MYCOBACTERIA CULTURE: CPT | Performed by: FAMILY MEDICINE

## 2022-11-10 PROCEDURE — 370N000003 HC ANESTHESIA WARD SERVICE

## 2022-11-10 PROCEDURE — 87205 SMEAR GRAM STAIN: CPT | Performed by: FAMILY MEDICINE

## 2022-11-10 PROCEDURE — 250N000011 HC RX IP 250 OP 636: Performed by: PEDIATRICS

## 2022-11-10 PROCEDURE — 99285 EMERGENCY DEPT VISIT HI MDM: CPT | Mod: 25

## 2022-11-10 PROCEDURE — 86481 TB AG RESPONSE T-CELL SUSP: CPT | Performed by: FAMILY MEDICINE

## 2022-11-10 PROCEDURE — 62270 DX LMBR SPI PNXR: CPT

## 2022-11-10 PROCEDURE — 999N000104 HC STATISTIC NO CHARGE: Performed by: PHYSICIAN ASSISTANT

## 2022-11-10 PROCEDURE — 71260 CT THORAX DX C+: CPT

## 2022-11-10 PROCEDURE — 70450 CT HEAD/BRAIN W/O DYE: CPT

## 2022-11-10 PROCEDURE — 36415 COLL VENOUS BLD VENIPUNCTURE: CPT | Performed by: FAMILY MEDICINE

## 2022-11-10 PROCEDURE — 250N000011 HC RX IP 250 OP 636: Performed by: FAMILY MEDICINE

## 2022-11-10 PROCEDURE — 82945 GLUCOSE OTHER FLUID: CPT | Performed by: FAMILY MEDICINE

## 2022-11-10 PROCEDURE — 87070 CULTURE OTHR SPECIMN AEROBIC: CPT | Performed by: FAMILY MEDICINE

## 2022-11-10 PROCEDURE — 84157 ASSAY OF PROTEIN OTHER: CPT | Performed by: FAMILY MEDICINE

## 2022-11-10 PROCEDURE — 87206 SMEAR FLUORESCENT/ACID STAI: CPT | Performed by: FAMILY MEDICINE

## 2022-11-10 RX ORDER — CEFTRIAXONE 2 G/1
2 INJECTION, POWDER, FOR SOLUTION INTRAMUSCULAR; INTRAVENOUS EVERY 12 HOURS
Status: CANCELLED
Start: 2022-11-11

## 2022-11-10 RX ORDER — EPINEPHRINE 1 MG/ML
0.3 INJECTION, SOLUTION INTRAMUSCULAR; SUBCUTANEOUS EVERY 5 MIN PRN
Status: CANCELLED | OUTPATIENT
Start: 2022-11-10

## 2022-11-10 RX ORDER — MEPERIDINE HYDROCHLORIDE 25 MG/ML
25 INJECTION INTRAMUSCULAR; INTRAVENOUS; SUBCUTANEOUS EVERY 30 MIN PRN
Status: CANCELLED | OUTPATIENT
Start: 2022-11-10

## 2022-11-10 RX ORDER — ALBUTEROL SULFATE 90 UG/1
1-2 AEROSOL, METERED RESPIRATORY (INHALATION)
Status: CANCELLED
Start: 2022-11-10

## 2022-11-10 RX ORDER — METHYLPREDNISOLONE SODIUM SUCCINATE 125 MG/2ML
125 INJECTION, POWDER, LYOPHILIZED, FOR SOLUTION INTRAMUSCULAR; INTRAVENOUS
Status: CANCELLED
Start: 2022-11-10

## 2022-11-10 RX ORDER — HEPARIN SODIUM,PORCINE 10 UNIT/ML
5 VIAL (ML) INTRAVENOUS
Status: DISCONTINUED | OUTPATIENT
Start: 2022-11-10 | End: 2022-11-10 | Stop reason: HOSPADM

## 2022-11-10 RX ORDER — CEFTRIAXONE 2 G/1
2 INJECTION, POWDER, FOR SOLUTION INTRAMUSCULAR; INTRAVENOUS ONCE
Status: COMPLETED | OUTPATIENT
Start: 2022-11-10 | End: 2022-11-10

## 2022-11-10 RX ORDER — MEPERIDINE HYDROCHLORIDE 25 MG/ML
25 INJECTION INTRAMUSCULAR; INTRAVENOUS; SUBCUTANEOUS EVERY 30 MIN PRN
Status: DISCONTINUED | OUTPATIENT
Start: 2022-11-10 | End: 2022-11-10 | Stop reason: HOSPADM

## 2022-11-10 RX ORDER — IOPAMIDOL 755 MG/ML
500 INJECTION, SOLUTION INTRAVASCULAR ONCE
Status: COMPLETED | OUTPATIENT
Start: 2022-11-10 | End: 2022-11-10

## 2022-11-10 RX ORDER — DIPHENHYDRAMINE HYDROCHLORIDE 50 MG/ML
50 INJECTION INTRAMUSCULAR; INTRAVENOUS
Status: CANCELLED
Start: 2022-11-10

## 2022-11-10 RX ORDER — ALBUTEROL SULFATE 90 UG/1
1-2 AEROSOL, METERED RESPIRATORY (INHALATION)
Status: DISCONTINUED | OUTPATIENT
Start: 2022-11-10 | End: 2022-11-10 | Stop reason: HOSPADM

## 2022-11-10 RX ORDER — EPINEPHRINE 1 MG/ML
0.3 INJECTION, SOLUTION INTRAMUSCULAR; SUBCUTANEOUS EVERY 5 MIN PRN
Status: DISCONTINUED | OUTPATIENT
Start: 2022-11-10 | End: 2022-11-10 | Stop reason: HOSPADM

## 2022-11-10 RX ORDER — HEPARIN SODIUM (PORCINE) LOCK FLUSH IV SOLN 100 UNIT/ML 100 UNIT/ML
5 SOLUTION INTRAVENOUS
Status: DISCONTINUED | OUTPATIENT
Start: 2022-11-10 | End: 2022-11-10 | Stop reason: HOSPADM

## 2022-11-10 RX ORDER — HEPARIN SODIUM (PORCINE) LOCK FLUSH IV SOLN 100 UNIT/ML 100 UNIT/ML
5 SOLUTION INTRAVENOUS
Status: CANCELLED | OUTPATIENT
Start: 2022-11-10

## 2022-11-10 RX ORDER — METHYLPREDNISOLONE SODIUM SUCCINATE 125 MG/2ML
125 INJECTION, POWDER, LYOPHILIZED, FOR SOLUTION INTRAMUSCULAR; INTRAVENOUS
Status: DISCONTINUED | OUTPATIENT
Start: 2022-11-10 | End: 2022-11-10 | Stop reason: HOSPADM

## 2022-11-10 RX ORDER — ALBUTEROL SULFATE 0.83 MG/ML
2.5 SOLUTION RESPIRATORY (INHALATION)
Status: CANCELLED | OUTPATIENT
Start: 2022-11-10

## 2022-11-10 RX ORDER — HEPARIN SODIUM,PORCINE 10 UNIT/ML
5 VIAL (ML) INTRAVENOUS
Status: CANCELLED | OUTPATIENT
Start: 2022-11-10

## 2022-11-10 RX ORDER — DIPHENHYDRAMINE HYDROCHLORIDE 50 MG/ML
50 INJECTION INTRAMUSCULAR; INTRAVENOUS
Status: DISCONTINUED | OUTPATIENT
Start: 2022-11-10 | End: 2022-11-10 | Stop reason: HOSPADM

## 2022-11-10 RX ORDER — ALBUTEROL SULFATE 0.83 MG/ML
2.5 SOLUTION RESPIRATORY (INHALATION)
Status: DISCONTINUED | OUTPATIENT
Start: 2022-11-10 | End: 2022-11-10 | Stop reason: HOSPADM

## 2022-11-10 RX ADMIN — CEFTRIAXONE SODIUM 2 G: 2 INJECTION, POWDER, FOR SOLUTION INTRAMUSCULAR; INTRAVENOUS at 20:46

## 2022-11-10 RX ADMIN — IOPAMIDOL 75 ML: 755 INJECTION, SOLUTION INTRAVENOUS at 11:13

## 2022-11-10 RX ADMIN — CEFTRIAXONE SODIUM 2 G: 2 INJECTION, POWDER, FOR SOLUTION INTRAMUSCULAR; INTRAVENOUS at 08:36

## 2022-11-10 ASSESSMENT — ACTIVITIES OF DAILY LIVING (ADL)
ADLS_ACUITY_SCORE: 35

## 2022-11-10 NOTE — DISCHARGE INSTRUCTIONS
1.  You have 5 more days worth of ceftriaxone treatment for your meningitis.  Please continue to come twice a day for this.

## 2022-11-10 NOTE — ANESTHESIA PROCEDURE NOTES
"Lumbar puncture Procedure Note    Pre-Procedure   Staff -        CRNA: Alexandria Munoz APRN CRNA       Performed By: CRNA       Location: ED       Procedure Start/Stop Times: 11/10/2022 10:35 AM and 11/10/2022 11:00 AM       Pre-Anesthestic Checklist: patient identified, IV checked, risks and benefits discussed, informed consent, monitors and equipment checked, pre-op evaluation and at physician/surgeon's request  Timeout:       Correct Patient: Yes        Correct Procedure: Yes        Correct Site: Yes        Correct Position: Yes   Procedure Documentation  Procedure: lumbar puncture       Diagnosis: possible meningitis       Patient Position: sitting       Patient Prep/Sterile Barriers: sterile gloves, mask       Skin prep: Betadine       Insertion Site: L3-4. (midline approach).       Needle Gauge: 25.        Needle Length (Inches): 3.5        Spinal Needle Type: Quincke       Introducer used       Introducer: 20 G       # of attempts: 1 and  # of redirects:  1    Assessment/Narrative         Paresthesias: No.       LP fluid removal amount (ml): 4 (In 4 samples of 1ml each)       CSF fluid: clear.    Medication(s) Administered   Medication Administration Time: 11/10/2022 10:35 AM      FOR [facility] ONLY:   Pain Team Contact information: please page the Pain Team Via Social [employer]. Search \"Pain\". During daytime hours, please page the attending first. At night please page the resident first.    "

## 2022-11-10 NOTE — ANESTHESIA PREPROCEDURE EVALUATION
Anesthesia Pre-Procedure Evaluation    Patient: David Huang   MRN: [id number] : 1990        Procedure : * No procedures listed *          Past Medical History:   Diagnosis Date     History of meningitis       No past surgical history on file.   No Known Allergies   Social History     Tobacco Use     Smoking status: Never     Smokeless tobacco: Never   Substance Use Topics     Alcohol use: Yes      Wt Readings from Last 1 Encounters:   22 70.3 kg (155 lb)        Anesthesia Evaluation            ROS/MED HX  ENT/Pulmonary:  - neg pulmonary ROS     Neurologic: Comment: Hx of meningitis      Cardiovascular:  - neg cardiovascular ROS     METS/Exercise Tolerance:     Hematologic:  - neg hematologic  ROS     Musculoskeletal:  - neg musculoskeletal ROS     GI/Hepatic:  - neg GI/hepatic ROS     Renal/Genitourinary:  - neg Renal ROS     Endo:  - neg endo ROS     Psychiatric/Substance Use:  - neg psychiatric ROS     Infectious Disease:  - neg infectious disease ROS     Malignancy:  - neg malignancy ROS     Other:  - neg other ROS             OUTSIDE LABS:  CBC:   Lab Results   Component Value Date    WBC 5.5 2022    WBC 9.4 2022    HGB 16.2 2022    HGB 13.3 2022    HCT 47.6 2022    HCT 38.3 (L) 2022     2022     2022     BMP:   Lab Results   Component Value Date     2022     2022    POTASSIUM 3.6 2022    POTASSIUM 4.1 2022    CHLORIDE 114 (H) 2022    CHLORIDE 112 (H) 2022    CO2 20 2022    CO2 25 2022    BUN 10 2022    BUN 12 2022    CR 0.84 2022    CR 0.97 2022     (H) 2022     (H) 2022     COAGS: No results found for: PTT, INR, FIBR  POC: No results found for: BGM, HCG, HCGS  HEPATIC:   Lab Results   Component Value Date    ALBUMIN 3.2 (L) 2022    PROTTOTAL 6.5 (L) 2022    ALT 16 2022    AST 20 2022    ALKPHOS  59 11/02/2022    BILITOTAL 0.3 11/02/2022     OTHER:   Lab Results   Component Value Date    LACT 1.0 11/01/2022    A1C 5.5 05/19/2022    LAKHWINDER 7.9 (L) 11/02/2022    MAG 2.1 11/07/2022    LIPASE 119 11/01/2022    CRP 50.4 (H) 11/01/2022       Anesthesia Plan    ASA Status:  2   - Procedure: Procedure only, no anesthetic delivered                    Consents    Anesthesia Plan(s) and associated risks, benefits, and realistic alternatives discussed. Questions answered and patient/representative(s) expressed understanding.     - Discussed: Risks, Benefits and Alternatives for the PROCEDURE were discussed     - Discussed with:  Patient         Postoperative Care            Comments:    Other Comments: The risks and benefits of a lumbar puncture and the alternatives where applicable, have been discussed with the patient, and they wish to proceed. Paper consent obtained and placed in chart.            HO Saldivar CRNA

## 2022-11-10 NOTE — ANESTHESIA POSTPROCEDURE EVALUATION
Patient: David Huang    Procedure: * No procedures listed *       Anesthesia Type:  No value filed.    Note:  Disposition: Outpatient   Postop Pain Control: Uneventful            Sign Out: Well controlled pain   PONV: No   Neuro/Psych: Uneventful            Sign Out: Acceptable/Baseline neuro status   Airway/Respiratory: Uneventful            Sign Out: Acceptable/Baseline resp. status   CV/Hemodynamics: Uneventful            Sign Out: Acceptable CV status   Other NRE: NONE   DID A NON-ROUTINE EVENT OCCUR? No    Event details/Postop Comments:  No complications.  I will follow up with the pt if needed.           Last vitals:  There were no vitals filed for this visit.    Electronically Signed By: HO Saldivar CRNA  November 10, 2022  11:16 AM

## 2022-11-10 NOTE — PROGRESS NOTES
Patient presented for infusion therapy of Rocephin for suspected bacterial meningitis.  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  His quantiferon TB goal test came back positive yesterday.      On review of his chart he had some small lung nodules and lymphadenopathy in May 2022 that was supposed to have follow up and did not.  At that time patient did have similar symptoms and concern for meningitis at that time as well.  Patient does report having a cough that started at the same times as his headache and fevers 10-12 days ago and feels it is better now - no longer bringing anything up.      Discussed with Dr Sosa, infectious disease provider available though [facility] Curbside Advise line.  Patient will be placed on airborne precautions and brought to the emergency room for ongoing testing.  Recommendations at this time with known information:    -  Patient should have repeat CT scan of the head and chest to evaluate for active TB.  -  Patient should have a repeat lumbar puncture with opening pressure noted.  Patient should have repeat CBC with diff, AFB smear and culture, CSF MTB PCR, adenosine deaminase.    -  If patient does have productive cough should also obtain AFB sputum smear and culture    If imaging is concerning for active TB, patient will need to be quarantined and a referral to the [facility] TB treatment program given so patient can start treatment as soon as possible.    If imaging is not concerning for active TB, patient will need to see infectious disease in follow-up in 1-2 weeks for review of all the testing and determine if treatment for latent TB is necessary.      Patient was informed of all of these recommendations and the reasons why and all questions were answered.        Total Visit Time: 69 minutes    Total Face-to-Face Prolonged Service Time: 47  minutes    Content of the Prolonged Time: discussion with specialty provider regarding treatment plan, discussion with ED physician regarding treatment plan, education and discussion with patient regarding TB types and need for further evaluation as well as what evaluation entails and possible treatment options depending on the results going forward.

## 2022-11-10 NOTE — ANESTHESIA CARE TRANSFER NOTE
Patient: David Huang    Procedure: * No procedures listed *       Diagnosis: * No pre-op diagnosis entered *  Diagnosis Additional Information: No value filed.    Anesthesia Type:   No value filed.     Note:    Oropharynx: spontaneously breathing and oropharynx clear of all foreign objects  Level of Consciousness: awake  Oxygen Supplementation: room air    Independent Airway: airway patency satisfactory and stable  Dentition: dentition unchanged  Vital Signs Stable: post-procedure vital signs reviewed and stable  Report to RN Given: handoff report given  Patient transferred to: Emergency Department    Handoff Report: Identifed the Patient, Identified the Reponsible Provider, Reviewed the pertinent medical history, Discussed the surgical course, Reviewed Intra-OP anesthesia mangement and issues during anesthesia, Set expectations for post-procedure period and Allowed opportunity for questions and acknowledgement of understanding      Vitals:  Vitals Value Taken Time   BP     Temp     Pulse     Resp     SpO2         Electronically Signed By: HO Saldivar CRNA  November 10, 2022  11:15 AM

## 2022-11-10 NOTE — PROGRESS NOTES
Infusion Nursing Note:  David Huang presents today for Rocephin.    Patient seen by provider today: Yes: Dr Aurora Tavares   present during visit today: yes, [place].    Note: Report rec'd prior to arrival that pt has a positive TB test.  Pt was isolated on arrival and given Rocephin without incident.  Dr Tavares then saw pt (see note) and used interpretor services to explain situation to patient.  Pt taken to ED following infusion for further testing, CT and LP, as explained by Dr Tavares.     Intravenous Access:  PICC.    Treatment Conditions:  Not Applicable.    Post Infusion Assessment:  Patient tolerated infusion without incident.  Blood return noted pre and post infusion.  Site patent and intact, free from redness, edema or discomfort.  No evidence of extravasations.     Discharge Plan:   Discharge instructions reviewed with: Patient.  Patient discharged in stable condition accompanied by: self.  Departure Mode: Ambulatory to ED.      Shameka Denton RN

## 2022-11-10 NOTE — ED PROVIDER NOTES
History   No chief complaint on file.    HPI  David Huang is a 32 year old male who was sent down from the infusion center per infectious disease recommendations for more of a work-up for possible active TB.  Patient was seen in the infusion center for continued twice a day Rocephin dosing for presumed bacterial meningitis.  Patient just comes down as referred before.  Patient does not have any new complaints.  And denies any significant headache right now or fevers or chills.    Summary of infusion visit from today:  Patient presented for infusion therapy of Rocephin for suspected bacterial meningitis.  Patient is a [place] worker [place] with plans to return home for the winter later this month and [place] work.  His quantiferon TB goal test came back positive yesterday.       On review of his chart he had some small lung nodules and lymphadenopathy in May 2022 that was supposed to have follow up and did not.  At that time patient did have similar symptoms and concern for meningitis at that time as well.  Patient does report having a cough that started at the same times as his headache and fevers 10-12 days ago and feels it is better now - no longer bringing anything up.       Discussed with Dr Sosa, infectious disease provider available though [facility] Curbside Advise line.  Patient will be placed on airborne precautions and brought to the emergency room for ongoing testing.  Recommendations at this time with known information:     -  Patient should have repeat CT scan of the head and chest to evaluate for active TB.  -  Patient should have a repeat lumbar puncture with opening pressure noted.  Patient should have repeat CBC with diff, AFB smear and culture, CSF MTB PCR, adenosine deaminase.    -  If patient does have productive cough should also obtain AFB sputum smear and culture     If imaging is concerning for active TB, patient  will need to be quarantined and a referral to the [facility] TB treatment program given so patient can start treatment as soon as possible.     If imaging is not concerning for active TB, patient will need to see infectious disease in follow-up in 1-2 weeks for review of all the testing and determine if treatment for latent TB is necessary.       Patient was informed of all of these recommendations and the reasons why and all questions were answered.      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    Family History:    Family History   Problem Relation Age of Onset     Other - See Comments Mother         Gu[place]an [place] Syndrome       Social History:  Marital Status:  Single [1]  Social History     Tobacco Use     Smoking status: Never     Smokeless tobacco: Never   Substance Use Topics     Alcohol use: Yes     Drug use: Never        Medications:    cefTRIAXone (ROCEPHIN) 2 GM vial  ibuprofen (ADVIL/MOTRIN) 200 MG tablet          Review of Systems   All other systems reviewed and are negative.      Physical Exam          Physical Exam  Vitals and nursing note reviewed.   Constitutional:       General: He is not in acute distress.     Appearance: He is  well-developed and well-nourished. He is not diaphoretic.   HENT:      Head: Normocephalic and atraumatic.      Nose: Nose normal.      Mouth/Throat:      Mouth: Oropharynx is clear and moist.      Pharynx: No oropharyngeal exudate.   Eyes:      General: No scleral icterus.     Conjunctiva/sclera: Conjunctivae normal.      Pupils: Pupils are equal, round, and reactive to light.   Cardiovascular:      Rate and Rhythm: Normal rate and regular rhythm.      Pulses: Intact distal pulses.      Heart sounds: Normal heart sounds. No murmur heard.    No friction rub.   Pulmonary:      Effort: Pulmonary effort is normal. No respiratory distress.      Breath sounds: Normal breath sounds. No wheezing or rales.   Abdominal:      General: Bowel sounds are normal. There is no distension.      Palpations: Abdomen is soft. There is no mass.      Tenderness: There is no abdominal tenderness. There is no guarding or rebound.   Musculoskeletal:         General: No tenderness or edema. Normal range of motion.      Cervical back: Normal range of motion.   Skin:     General: Skin is warm.      Findings: No rash.   Neurological:      Mental Status: He is alert and oriented to person, place, and time.   Psychiatric:         Judgment: Judgment normal.         ED Course                 Procedures        Results for orders placed or performed during the hospital encounter of 11/10/22 (from the past 24 hour(s))   Extra Tube    Narrative    The following orders were created for panel order Extra Tube.  Procedure                               Abnormality         Status                     ---------                               -----------         ------                     Extra Green Top (Lithium...[[id number]]                      Final result                 Please view results for these tests on the individual orders.   Extra Green Top (Lithium Heparin) Tube   Result Value Ref Range    Hold Specimen JIC    CBC with platelets differential     Narrative    The following orders were created for panel order CBC with platelets differential.  Procedure                               Abnormality         Status                     ---------                               -----------         ------                     CBC with platelets and d...[[id number]]                      Final result                 Please view results for these tests on the individual orders.   Quantiferon-TB Gold Plus    Specimen: Arm, Right; Blood    Narrative    The following orders were created for panel order Quantiferon-TB Gold Plus.  Procedure                               Abnormality         Status                     ---------                               -----------         ------                     Quantiferon TB Gold Plus...[[id number]]                      In process                 Quantiferon TB Gold Plus...[[id number]]                      In process                 Quantiferon TB Gold Plus...[[id number]]                      In process                 Quantiferon TB Gold Plus...[[id number]]                      In process                   Please view results for these tests on the individual orders.   CBC with platelets and differential   Result Value Ref Range    WBC Count 6.0 4.0 - 11.0 10e3/uL    RBC Count 4.88 4.40 - 5.90 10e6/uL    Hemoglobin 15.0 13.3 - 17.7 g/dL    Hematocrit 43.6 40.0 - 53.0 %    MCV 89 78 - 100 fL    MCH 30.7 26.5 - 33.0 pg    MCHC 34.4 31.5 - 36.5 g/dL    RDW 12.0 10.0 - 15.0 %    Platelet Count 206 150 - 450 10e3/uL    % Neutrophils 58 %    % Lymphocytes 31 %    % Monocytes 8 %    % Eosinophils 1 %    % Basophils 1 %    % Immature Granulocytes 1 %    NRBCs per 100 WBC 0 <1 /100    Absolute Neutrophils 3.5 1.6 - 8.3 10e3/uL    Absolute Lymphocytes 1.8 0.8 - 5.3 10e3/uL    Absolute Monocytes 0.5 0.0 - 1.3 10e3/uL    Absolute Eosinophils 0.1 0.0 - 0.7 10e3/uL    Absolute Basophils 0.1 0.0 - 0.2 10e3/uL    Absolute Immature Granulocytes 0.1 <=0.4  10e3/uL    Absolute NRBCs 0.0 10e3/uL   Glucose CSF:   Result Value Ref Range    Glucose CSF 56 40 - 70 mg/dL    Narrative    CSF glucose concentrations are about 60 percent of normal plasma glucose.  CSF glucose concentrations are about 60 percent of normal plasma glucose.   Protein total CSF:   Result Value Ref Range    Protein total CSF 93 (H) 15 - 60 mg/dL    Narrative    This is a lab developed test. It has not been cleared or approved by the FDA.   CSF Cell Count with Differential:    Narrative    The following orders were created for panel order CSF Cell Count with Differential:.  Procedure                               Abnormality         Status                     ---------                               -----------         ------                     Cell Count CSF[[id number]]               Abnormal            Final result                 Please view results for these tests on the individual orders.   Acid-Fast Bacilli Culture and Stain    Specimen: Spinal Cord; Cerebrospinal fluid    Narrative    The following orders were created for panel order Acid-Fast Bacilli Culture and Stain.  Procedure                               Abnormality         Status                     ---------                               -----------         ------                     Acid-Fast Bacilli Cultur...[[id number]]                      In process                   Please view results for these tests on the individual orders.   Cell Count CSF   Result Value Ref Range    Tube Number 4     Color Colorless Colorless    Clarity Clear Clear    Total Nucleated Cells 1 0 - 5 /uL    RBC Count 3 (H) 0 - 2 /uL   Head CT w/o contrast    Narrative    CT SCAN OF THE HEAD WITHOUT CONTRAST   11/10/2022 11:21 AM     HISTORY: Headache, active TB.    TECHNIQUE:  Axial images of the head and coronal reformations without  IV contrast material. Radiation dose for this scan was reduced using  automated exposure control, adjustment of the mA and/or kV  according  to patient size, or iterative reconstruction technique.    COMPARISON: Head CT 2022.    FINDINGS: There is no evidence of intracranial hemorrhage, mass, acute  infarct or anomaly. The ventricles are normal in size, shape and  configuration. The brain parenchyma and subarachnoid spaces are  normal.     Mild mucosal thickening in the ethmoid air cells. The bony calvarium  and bones of the skull base appear intact.       Impression    IMPRESSION:   No evidence of acute intracranial hemorrhage, mass, or  herniation.     RITO MCMANUS MD         SYSTEM ID:  [id number]   CT CHEST W CONTRAST    Narrative    CT CHEST W CONTRAST 11/10/2022 11:34 AM    CLINICAL HISTORY: Positive QuantiFeron Gold TB test.  TECHNIQUE: CT chest with IV contrast. Multiplanar reformats were  obtained. Dose reduction techniques were used.    CONTRAST: Isovue 370, 75 mL    COMPARISON: 2022    FINDINGS:   LUNGS AND PLEURA: No acute infiltrate. Bilateral basilar streak  atelectasis. Scattered noncalcified pulmonary nodules, examples  include:    Image 125, 3 mm, left upper lobe  Image 144, 3 mm, right middle lobe  Image 154, 4 mm, left lower lobe    MEDIASTINUM/AXILLAE: Stable scattered mediastinal lymph nodes with  short axis dimension measurements, includin mm, subcarinal  14 mm, right infrahilar,  10 mm, right paratracheal    CORONARY ARTERY CALCIFICATION: None.    UPPER ABDOMEN: Stable lymph nodes including hermila hepatis node 30 x 15  mm.    MUSCULOSKELETAL: Unremarkable.      Impression    IMPRESSION:   1.  No acute infiltrate.  2.  Bilateral basilar streaky atelectasis.  3.  Stable noncalcified pulmonary nodules, mediastinal lymph nodes and  upper abdominal lymph nodes, detailed above.    DIXON LEVIN MD         SYSTEM ID:  [id number]       Medications   iopamidol (ISOVUE-370) solution 500 mL (75 mLs Intravenous Given 11/10/22 1113)     Tests were ordered per infectious disease recommendations.  CT shows  stable nodules, there is no signs of active TB.  Per their recommendations we will have the patient follow-up with the [facility] to get started on tuberculosis treatment.  CT scan of the head was normal and lumbar puncture was unremarkable.  They recommend continue the patient's dosing for the meningitis, patient has 5 more days of ceftriaxone.  Patient will be discharged at this time.    Assessments & Plan (with Medical Decision Making)  Meningitis, latent tuberculosis     I have reviewed the nursing notes.    I have reviewed the findings, diagnosis, plan and need for follow up with the patient.      New Prescriptions    No medications on file       Final diagnoses:   Meningitis   Latent tuberculosis       11/10/2022   [facility] EMERGENCY DEPT     Jefe Lemus MD  11/10/22 0111

## 2022-11-11 ENCOUNTER — INFUSION THERAPY VISIT (OUTPATIENT)
Dept: INFUSION THERAPY | Facility: CLINIC | Age: 32
End: 2022-11-11
Attending: PEDIATRICS

## 2022-11-11 ENCOUNTER — HOSPITAL ENCOUNTER (EMERGENCY)
Facility: CLINIC | Age: 32
Discharge: ANOTHER HOSPITAL | End: 2022-11-12
Attending: EMERGENCY MEDICINE | Admitting: EMERGENCY MEDICINE

## 2022-11-11 ENCOUNTER — APPOINTMENT (OUTPATIENT)
Dept: CT IMAGING | Facility: CLINIC | Age: 32
End: 2022-11-11
Attending: EMERGENCY MEDICINE

## 2022-11-11 ENCOUNTER — HOSPITAL ENCOUNTER (EMERGENCY)
Facility: CLINIC | Age: 32
Discharge: HOME | End: 2022-11-11
Admitting: PEDIATRICS

## 2022-11-11 VITALS
DIASTOLIC BLOOD PRESSURE: 59 MMHG | HEART RATE: 81 BPM | SYSTOLIC BLOOD PRESSURE: 101 MMHG | RESPIRATION RATE: 16 BRPM | TEMPERATURE: 98.2 F | OXYGEN SATURATION: 98 %

## 2022-11-11 VITALS
RESPIRATION RATE: 20 BRPM | HEART RATE: 90 BPM | DIASTOLIC BLOOD PRESSURE: 65 MMHG | OXYGEN SATURATION: 99 % | SYSTOLIC BLOOD PRESSURE: 105 MMHG | WEIGHT: 159 LBS | TEMPERATURE: 98.4 F | BODY MASS INDEX: 27.15 KG/M2

## 2022-11-11 DIAGNOSIS — R65.20 SEPSIS WITH ENCEPHALOPATHY WITHOUT SEPTIC SHOCK, DUE TO UNSPECIFIED ORGANISM (H): ICD-10-CM

## 2022-11-11 DIAGNOSIS — G00.9 BACTERIAL MENINGITIS: Primary | ICD-10-CM

## 2022-11-11 DIAGNOSIS — D72.820 LYMPHOCYTOSIS: ICD-10-CM

## 2022-11-11 DIAGNOSIS — A41.9 SEPSIS WITH ENCEPHALOPATHY WITHOUT SEPTIC SHOCK, DUE TO UNSPECIFIED ORGANISM (H): ICD-10-CM

## 2022-11-11 DIAGNOSIS — R41.82 ALTERED MENTAL STATUS, UNSPECIFIED ALTERED MENTAL STATUS TYPE: ICD-10-CM

## 2022-11-11 DIAGNOSIS — G00.9 BACTERIAL MENINGITIS: ICD-10-CM

## 2022-11-11 DIAGNOSIS — G93.41 SEPSIS WITH ENCEPHALOPATHY WITHOUT SEPTIC SHOCK, DUE TO UNSPECIFIED ORGANISM (H): ICD-10-CM

## 2022-11-11 DIAGNOSIS — E87.6 HYPOKALEMIA: ICD-10-CM

## 2022-11-11 LAB
BASE EXCESS BLDV CALC-SCNC: 0.4 MMOL/L (ref -7.7–1.9)
GLUCOSE BLDC GLUCOMTR-MCNC: 128 MG/DL (ref 70–99)
HCO3 BLDV-SCNC: 22 MMOL/L (ref 21–28)
HOLD SPECIMEN: NORMAL
O2/TOTAL GAS SETTING VFR VENT: 21 %
PCO2 BLDV: 28 MM HG (ref 40–50)
PH BLDV: 7.5 [PH] (ref 7.32–7.43)
PO2 BLDV: 31 MM HG (ref 25–47)

## 2022-11-11 PROCEDURE — 70450 CT HEAD/BRAIN W/O DYE: CPT

## 2022-11-11 PROCEDURE — C9803 HOPD COVID-19 SPEC COLLECT: HCPCS | Performed by: EMERGENCY MEDICINE

## 2022-11-11 PROCEDURE — 99285 EMERGENCY DEPT VISIT HI MDM: CPT | Mod: 25 | Performed by: EMERGENCY MEDICINE

## 2022-11-11 PROCEDURE — 82803 BLOOD GASES ANY COMBINATION: CPT | Performed by: EMERGENCY MEDICINE

## 2022-11-11 PROCEDURE — 96366 THER/PROPH/DIAG IV INF ADDON: CPT | Performed by: EMERGENCY MEDICINE

## 2022-11-11 PROCEDURE — 85025 COMPLETE CBC W/AUTO DIFF WBC: CPT | Performed by: EMERGENCY MEDICINE

## 2022-11-11 PROCEDURE — 83605 ASSAY OF LACTIC ACID: CPT | Performed by: EMERGENCY MEDICINE

## 2022-11-11 PROCEDURE — 86140 C-REACTIVE PROTEIN: CPT | Performed by: EMERGENCY MEDICINE

## 2022-11-11 PROCEDURE — 85652 RBC SED RATE AUTOMATED: CPT | Performed by: EMERGENCY MEDICINE

## 2022-11-11 PROCEDURE — 87637 SARSCOV2&INF A&B&RSV AMP PRB: CPT | Performed by: EMERGENCY MEDICINE

## 2022-11-11 PROCEDURE — 96361 HYDRATE IV INFUSION ADD-ON: CPT | Performed by: EMERGENCY MEDICINE

## 2022-11-11 PROCEDURE — 250N000011 HC RX IP 250 OP 636: Performed by: PEDIATRICS

## 2022-11-11 PROCEDURE — 80053 COMPREHEN METABOLIC PANEL: CPT | Performed by: EMERGENCY MEDICINE

## 2022-11-11 PROCEDURE — 96376 TX/PRO/DX INJ SAME DRUG ADON: CPT | Performed by: EMERGENCY MEDICINE

## 2022-11-11 PROCEDURE — 36415 COLL VENOUS BLD VENIPUNCTURE: CPT | Performed by: EMERGENCY MEDICINE

## 2022-11-11 PROCEDURE — 96365 THER/PROPH/DIAG IV INF INIT: CPT | Performed by: EMERGENCY MEDICINE

## 2022-11-11 PROCEDURE — 85007 BL SMEAR W/DIFF WBC COUNT: CPT | Performed by: EMERGENCY MEDICINE

## 2022-11-11 PROCEDURE — 96368 THER/DIAG CONCURRENT INF: CPT | Performed by: EMERGENCY MEDICINE

## 2022-11-11 PROCEDURE — 96375 TX/PRO/DX INJ NEW DRUG ADDON: CPT | Performed by: EMERGENCY MEDICINE

## 2022-11-11 PROCEDURE — 96367 TX/PROPH/DG ADDL SEQ IV INF: CPT | Performed by: EMERGENCY MEDICINE

## 2022-11-11 PROCEDURE — 96365 THER/PROPH/DIAG IV INF INIT: CPT

## 2022-11-11 PROCEDURE — 82077 ASSAY SPEC XCP UR&BREATH IA: CPT | Performed by: EMERGENCY MEDICINE

## 2022-11-11 PROCEDURE — 99285 EMERGENCY DEPT VISIT HI MDM: CPT | Performed by: EMERGENCY MEDICINE

## 2022-11-11 PROCEDURE — 999N000104 HC STATISTIC NO CHARGE: Performed by: EMERGENCY MEDICINE

## 2022-11-11 RX ORDER — METHYLPREDNISOLONE SODIUM SUCCINATE 125 MG/2ML
125 INJECTION, POWDER, LYOPHILIZED, FOR SOLUTION INTRAMUSCULAR; INTRAVENOUS
Status: DISCONTINUED | OUTPATIENT
Start: 2022-11-11 | End: 2022-11-11 | Stop reason: HOSPADM

## 2022-11-11 RX ORDER — ALBUTEROL SULFATE 90 UG/1
1-2 AEROSOL, METERED RESPIRATORY (INHALATION)
Status: DISCONTINUED | OUTPATIENT
Start: 2022-11-11 | End: 2022-11-11 | Stop reason: HOSPADM

## 2022-11-11 RX ORDER — CEFTRIAXONE 2 G/1
2 INJECTION, POWDER, FOR SOLUTION INTRAMUSCULAR; INTRAVENOUS EVERY 12 HOURS
Status: CANCELLED
Start: 2022-11-12

## 2022-11-11 RX ORDER — METHYLPREDNISOLONE SODIUM SUCCINATE 125 MG/2ML
125 INJECTION, POWDER, LYOPHILIZED, FOR SOLUTION INTRAMUSCULAR; INTRAVENOUS
Status: CANCELLED
Start: 2022-11-11

## 2022-11-11 RX ORDER — ALBUTEROL SULFATE 0.83 MG/ML
2.5 SOLUTION RESPIRATORY (INHALATION)
Status: CANCELLED | OUTPATIENT
Start: 2022-11-11

## 2022-11-11 RX ORDER — MEPERIDINE HYDROCHLORIDE 25 MG/ML
25 INJECTION INTRAMUSCULAR; INTRAVENOUS; SUBCUTANEOUS EVERY 30 MIN PRN
Status: CANCELLED | OUTPATIENT
Start: 2022-11-11

## 2022-11-11 RX ORDER — DIPHENHYDRAMINE HYDROCHLORIDE 50 MG/ML
50 INJECTION INTRAMUSCULAR; INTRAVENOUS
Status: CANCELLED
Start: 2022-11-11

## 2022-11-11 RX ORDER — HEPARIN SODIUM,PORCINE 10 UNIT/ML
5 VIAL (ML) INTRAVENOUS
Status: CANCELLED | OUTPATIENT
Start: 2022-11-11

## 2022-11-11 RX ORDER — ALBUTEROL SULFATE 0.83 MG/ML
2.5 SOLUTION RESPIRATORY (INHALATION)
Status: DISCONTINUED | OUTPATIENT
Start: 2022-11-11 | End: 2022-11-11 | Stop reason: HOSPADM

## 2022-11-11 RX ORDER — ALBUTEROL SULFATE 90 UG/1
1-2 AEROSOL, METERED RESPIRATORY (INHALATION)
Status: CANCELLED
Start: 2022-11-11

## 2022-11-11 RX ORDER — EPINEPHRINE 1 MG/ML
0.3 INJECTION, SOLUTION INTRAMUSCULAR; SUBCUTANEOUS EVERY 5 MIN PRN
Status: CANCELLED | OUTPATIENT
Start: 2022-11-11

## 2022-11-11 RX ORDER — CEFTRIAXONE 2 G/1
2 INJECTION, POWDER, FOR SOLUTION INTRAMUSCULAR; INTRAVENOUS EVERY 12 HOURS
Status: DISCONTINUED | OUTPATIENT
Start: 2022-11-11 | End: 2022-11-11 | Stop reason: HOSPADM

## 2022-11-11 RX ORDER — HEPARIN SODIUM (PORCINE) LOCK FLUSH IV SOLN 100 UNIT/ML 100 UNIT/ML
5 SOLUTION INTRAVENOUS
Status: CANCELLED | OUTPATIENT
Start: 2022-11-11

## 2022-11-11 RX ORDER — SODIUM CHLORIDE 9 MG/ML
INJECTION, SOLUTION INTRAVENOUS CONTINUOUS
Status: DISCONTINUED | OUTPATIENT
Start: 2022-11-12 | End: 2022-11-12 | Stop reason: HOSPADM

## 2022-11-11 RX ORDER — MEPERIDINE HYDROCHLORIDE 25 MG/ML
25 INJECTION INTRAMUSCULAR; INTRAVENOUS; SUBCUTANEOUS EVERY 30 MIN PRN
Status: DISCONTINUED | OUTPATIENT
Start: 2022-11-11 | End: 2022-11-11 | Stop reason: HOSPADM

## 2022-11-11 RX ORDER — DIPHENHYDRAMINE HYDROCHLORIDE 50 MG/ML
50 INJECTION INTRAMUSCULAR; INTRAVENOUS
Status: DISCONTINUED | OUTPATIENT
Start: 2022-11-11 | End: 2022-11-11 | Stop reason: HOSPADM

## 2022-11-11 RX ORDER — EPINEPHRINE 1 MG/ML
0.3 INJECTION, SOLUTION INTRAMUSCULAR; SUBCUTANEOUS EVERY 5 MIN PRN
Status: DISCONTINUED | OUTPATIENT
Start: 2022-11-11 | End: 2022-11-11 | Stop reason: HOSPADM

## 2022-11-11 RX ORDER — HEPARIN SODIUM,PORCINE 10 UNIT/ML
5 VIAL (ML) INTRAVENOUS
Status: DISCONTINUED | OUTPATIENT
Start: 2022-11-11 | End: 2022-11-11 | Stop reason: HOSPADM

## 2022-11-11 RX ORDER — CEFTRIAXONE 2 G/1
2 INJECTION, POWDER, FOR SOLUTION INTRAMUSCULAR; INTRAVENOUS ONCE
Status: COMPLETED | OUTPATIENT
Start: 2022-11-11 | End: 2022-11-11

## 2022-11-11 RX ADMIN — Medication 5 ML: at 09:08

## 2022-11-11 RX ADMIN — CEFTRIAXONE SODIUM 2 G: 2 INJECTION, POWDER, FOR SOLUTION INTRAMUSCULAR; INTRAVENOUS at 20:23

## 2022-11-11 RX ADMIN — Medication 5 ML: at 20:57

## 2022-11-11 RX ADMIN — CEFTRIAXONE SODIUM 2 G: 2 INJECTION, POWDER, FOR SOLUTION INTRAMUSCULAR; INTRAVENOUS at 08:33

## 2022-11-11 ASSESSMENT — PAIN SCALES - GENERAL: PAINLEVEL: NO PAIN (0)

## 2022-11-11 NOTE — ED TRIAGE NOTES
IVO     Triage Assessment     Row Name 11/10/22 2008       Triage Assessment (Adult)    Airway WDL WDL       Respiratory WDL    Respiratory WDL WDL       Cardiac WDL    Cardiac WDL WDL               Please follow up with your primary care physician within 2 days. Ensure that you review all lab work results and imaging results. If you have any questions about your discharge paperwork please call the Emergency Department.         Return to the Emergency Department for any fevers, worsening cough or congestion, shortness of breath, chest pain, nausea, vomiting, diarrhea, if symptoms do not improve, or for any new or worsening symptoms, unless otherwise told.  If you have been discharged pending a COVID test, please isolate as per the instructions given to you, and follow-up using the MyChart instructions in your discharge paperwork.  If you test positive, please contact the INFUSION SITE AT [facility] ([phone]) and schedule your infusion appointment.      Thank you for visiting [facility], Department of Emergency Medicine. Please see the entirety of the educational materials provided. Please note that a visit to the emergency department does not substitute ongoing care from a primary medical provider or specialist. Please ensure to follow up as recommended. However, please return to the emergency department immediately if symptoms do not improve as discussed, symptoms worsen, new symptoms develop, difficulty in following up or for any of your concerns or issues. Please note on discharge you are acknowledging understanding and agreement on medical evaluation, management recommendations and follow up recommendations.

## 2022-11-11 NOTE — PROGRESS NOTES
Infusion Nursing Note:  David Huang presents today for Rocephin.    Patient seen by provider today: No   present during visit today: Not working, used  on patient phone.     Note: Patient wanted to know if contagious with latent tuberculosis, informed he is not. Encouraged him to follow up with doctor when back in [place] next week for meningitis. Tlaked about TB treatment is long 6-12 months and he needs to take meds exactly as ordered. If has nausea to tell doctor during tx.     Intravenous Access:  Midline.    Treatment Conditions:  Not Applicable.    Post Infusion Assessment:  Patient tolerated infusion without incident.     Discharge Plan:   Patient discharged in stable condition accompanied by: self.  Departure Mode: Ambulatory.  ER informed patient coming thru weekend and returning here Monday 11/14 am.    Nilda Payne RN

## 2022-11-12 VITALS
SYSTOLIC BLOOD PRESSURE: 114 MMHG | DIASTOLIC BLOOD PRESSURE: 71 MMHG | HEART RATE: 132 BPM | OXYGEN SATURATION: 98 % | TEMPERATURE: 99.6 F | RESPIRATION RATE: 20 BRPM

## 2022-11-12 LAB
ADENOSINE DEAMINASE CSF-CCNC: <1 U/L
ALBUMIN SERPL-MCNC: 3.7 G/DL (ref 3.4–5)
ALP SERPL-CCNC: 73 U/L (ref 40–150)
ALT SERPL W P-5'-P-CCNC: 51 U/L (ref 0–70)
ANION GAP SERPL CALCULATED.3IONS-SCNC: 11 MMOL/L (ref 3–14)
AST SERPL W P-5'-P-CCNC: 40 U/L (ref 0–45)
BASOPHILS # BLD AUTO: 0.1 10E3/UL (ref 0–0.2)
BASOPHILS # BLD MANUAL: 0 10E3/UL (ref 0–0.2)
BASOPHILS NFR BLD AUTO: 0 %
BASOPHILS NFR BLD MANUAL: 0 %
BILIRUB SERPL-MCNC: 0.2 MG/DL (ref 0.2–1.3)
BUN SERPL-MCNC: 13 MG/DL (ref 7–30)
CALCIUM SERPL-MCNC: 8.7 MG/DL (ref 8.5–10.1)
CHLORIDE BLD-SCNC: 108 MMOL/L (ref 94–109)
CO2 SERPL-SCNC: 20 MMOL/L (ref 20–32)
CREAT SERPL-MCNC: 0.76 MG/DL (ref 0.66–1.25)
CRP SERPL-MCNC: <2.9 MG/L (ref 0–8)
EOSINOPHIL # BLD AUTO: 0.2 10E3/UL (ref 0–0.7)
EOSINOPHIL # BLD MANUAL: 0 10E3/UL (ref 0–0.7)
EOSINOPHIL NFR BLD AUTO: 1 %
EOSINOPHIL NFR BLD MANUAL: 0 %
ERYTHROCYTE [DISTWIDTH] IN BLOOD BY AUTOMATED COUNT: 12.1 % (ref 10–15)
ERYTHROCYTE [SEDIMENTATION RATE] IN BLOOD BY WESTERGREN METHOD: 10 MM/HR (ref 0–15)
ETHANOL SERPL-MCNC: <0.01 G/DL
FLUAV RNA SPEC QL NAA+PROBE: NEGATIVE
FLUBV RNA RESP QL NAA+PROBE: NEGATIVE
GAMMA INTERFERON BACKGROUND BLD IA-ACNC: 3.64 IU/ML
GFR SERPL CREATININE-BSD FRML MDRD: >90 ML/MIN/1.73M2
GLUCOSE BLD-MCNC: 194 MG/DL (ref 70–99)
HCT VFR BLD AUTO: 41.3 % (ref 40–53)
HGB BLD-MCNC: 14.5 G/DL (ref 13.3–17.7)
IMM GRANULOCYTES # BLD: 0.1 10E3/UL
IMM GRANULOCYTES NFR BLD: 1 %
LACTATE SERPL-SCNC: 4.7 MMOL/L (ref 0.7–2)
LACTATE SERPL-SCNC: 5.5 MMOL/L (ref 0.7–2)
LYMPHOCYTES # BLD AUTO: 6.9 10E3/UL (ref 0.8–5.3)
LYMPHOCYTES # BLD MANUAL: 6.7 10E3/UL (ref 0.8–5.3)
LYMPHOCYTES NFR BLD AUTO: 45 %
LYMPHOCYTES NFR BLD MANUAL: 43 %
M TB IFN-G BLD-IMP: POSITIVE
M TB IFN-G CD4+ BCKGRND COR BLD-ACNC: 6.36 IU/ML
MCH RBC QN AUTO: 30.1 PG (ref 26.5–33)
MCHC RBC AUTO-ENTMCNC: 35.1 G/DL (ref 31.5–36.5)
MCV RBC AUTO: 86 FL (ref 78–100)
MITOGEN IGNF BCKGRD COR BLD-ACNC: 6.36 IU/ML
MITOGEN IGNF BCKGRD COR BLD-ACNC: 6.36 IU/ML
MONOCYTES # BLD AUTO: 0.8 10E3/UL (ref 0–1.3)
MONOCYTES # BLD MANUAL: 0.6 10E3/UL (ref 0–1.3)
MONOCYTES NFR BLD AUTO: 5 %
MONOCYTES NFR BLD MANUAL: 4 %
NEUTROPHILS # BLD AUTO: 7.5 10E3/UL (ref 1.6–8.3)
NEUTROPHILS # BLD MANUAL: 8.2 10E3/UL (ref 1.6–8.3)
NEUTROPHILS NFR BLD AUTO: 48 %
NEUTROPHILS NFR BLD MANUAL: 53 %
PATH REV: ABNORMAL
PLAT MORPH BLD: ABNORMAL
PLATELET # BLD AUTO: 334 10E3/UL (ref 150–450)
POTASSIUM BLD-SCNC: 2.7 MMOL/L (ref 3.4–5.3)
PROT SERPL-MCNC: 7.4 G/DL (ref 6.8–8.8)
QUANTIFERON MITOGEN: 10 IU/ML
QUANTIFERON NIL TUBE: 3.64 IU/ML
QUANTIFERON TB1 TUBE: 10 IU/ML
QUANTIFERON TB2 TUBE: 10
RBC # BLD AUTO: 4.81 10E6/UL (ref 4.4–5.9)
RBC MORPH BLD: ABNORMAL
RSV RNA SPEC NAA+PROBE: NEGATIVE
SARS-COV-2 RNA RESP QL NAA+PROBE: NEGATIVE
SODIUM SERPL-SCNC: 139 MMOL/L (ref 133–144)
WBC # BLD AUTO: 15.5 10E3/UL (ref 4–11)

## 2022-11-12 PROCEDURE — 87040 BLOOD CULTURE FOR BACTERIA: CPT | Performed by: EMERGENCY MEDICINE

## 2022-11-12 PROCEDURE — 36415 COLL VENOUS BLD VENIPUNCTURE: CPT | Performed by: EMERGENCY MEDICINE

## 2022-11-12 PROCEDURE — 258N000003 HC RX IP 258 OP 636: Performed by: EMERGENCY MEDICINE

## 2022-11-12 PROCEDURE — 250N000013 HC RX MED GY IP 250 OP 250 PS 637: Performed by: EMERGENCY MEDICINE

## 2022-11-12 PROCEDURE — 83605 ASSAY OF LACTIC ACID: CPT | Performed by: EMERGENCY MEDICINE

## 2022-11-12 PROCEDURE — 250N000011 HC RX IP 250 OP 636: Performed by: EMERGENCY MEDICINE

## 2022-11-12 RX ORDER — DIPHENHYDRAMINE HYDROCHLORIDE 50 MG/ML
25 INJECTION INTRAMUSCULAR; INTRAVENOUS ONCE
Status: COMPLETED | OUTPATIENT
Start: 2022-11-12 | End: 2022-11-12

## 2022-11-12 RX ORDER — DEXAMETHASONE SODIUM PHOSPHATE 10 MG/ML
10 INJECTION, SOLUTION INTRAMUSCULAR; INTRAVENOUS ONCE
Status: COMPLETED | OUTPATIENT
Start: 2022-11-12 | End: 2022-11-12

## 2022-11-12 RX ORDER — ACETAMINOPHEN 650 MG/1
650 SUPPOSITORY RECTAL ONCE
Status: COMPLETED | OUTPATIENT
Start: 2022-11-12 | End: 2022-11-12

## 2022-11-12 RX ORDER — MAGNESIUM SULFATE 1 G/100ML
1 INJECTION INTRAVENOUS ONCE
Status: COMPLETED | OUTPATIENT
Start: 2022-11-12 | End: 2022-11-12

## 2022-11-12 RX ORDER — POTASSIUM CHLORIDE 7.45 MG/ML
10 INJECTION INTRAVENOUS CONTINUOUS
Status: DISCONTINUED | OUTPATIENT
Start: 2022-11-12 | End: 2022-11-12 | Stop reason: HOSPADM

## 2022-11-12 RX ORDER — LORAZEPAM 2 MG/ML
2 INJECTION INTRAMUSCULAR ONCE
Status: COMPLETED | OUTPATIENT
Start: 2022-11-12 | End: 2022-11-12

## 2022-11-12 RX ORDER — KETOROLAC TROMETHAMINE 30 MG/ML
30 INJECTION, SOLUTION INTRAMUSCULAR; INTRAVENOUS ONCE
Status: COMPLETED | OUTPATIENT
Start: 2022-11-12 | End: 2022-11-12

## 2022-11-12 RX ADMIN — PROCHLORPERAZINE EDISYLATE 10 MG: 5 INJECTION INTRAMUSCULAR; INTRAVENOUS at 00:39

## 2022-11-12 RX ADMIN — POTASSIUM CHLORIDE 10 MEQ: 7.46 INJECTION, SOLUTION INTRAVENOUS at 03:19

## 2022-11-12 RX ADMIN — LORAZEPAM 2 MG: 2 INJECTION INTRAMUSCULAR; INTRAVENOUS at 00:09

## 2022-11-12 RX ADMIN — POTASSIUM CHLORIDE 10 MEQ: 7.46 INJECTION, SOLUTION INTRAVENOUS at 01:19

## 2022-11-12 RX ADMIN — DIPHENHYDRAMINE HYDROCHLORIDE 25 MG: 50 INJECTION, SOLUTION INTRAMUSCULAR; INTRAVENOUS at 00:37

## 2022-11-12 RX ADMIN — VANCOMYCIN HYDROCHLORIDE 1500 MG: 1 INJECTION, POWDER, LYOPHILIZED, FOR SOLUTION INTRAVENOUS at 03:06

## 2022-11-12 RX ADMIN — SODIUM CHLORIDE 1000 ML: 9 INJECTION, SOLUTION INTRAVENOUS at 01:36

## 2022-11-12 RX ADMIN — POTASSIUM CHLORIDE 10 MEQ: 7.46 INJECTION, SOLUTION INTRAVENOUS at 02:18

## 2022-11-12 RX ADMIN — KETOROLAC TROMETHAMINE 30 MG: 30 INJECTION, SOLUTION INTRAMUSCULAR at 00:26

## 2022-11-12 RX ADMIN — POTASSIUM CHLORIDE 10 MEQ: 7.46 INJECTION, SOLUTION INTRAVENOUS at 00:29

## 2022-11-12 RX ADMIN — ACETAMINOPHEN 650 MG: 650 SUPPOSITORY RECTAL at 05:19

## 2022-11-12 RX ADMIN — LORAZEPAM 2 MG: 2 INJECTION INTRAMUSCULAR; INTRAVENOUS at 05:07

## 2022-11-12 RX ADMIN — POTASSIUM CHLORIDE 10 MEQ: 7.46 INJECTION, SOLUTION INTRAVENOUS at 04:22

## 2022-11-12 RX ADMIN — SODIUM CHLORIDE: 9 INJECTION, SOLUTION INTRAVENOUS at 02:38

## 2022-11-12 RX ADMIN — MAGNESIUM SULFATE HEPTAHYDRATE 1 G: 1 INJECTION, SOLUTION INTRAVENOUS at 00:43

## 2022-11-12 RX ADMIN — SODIUM CHLORIDE 1000 ML: 9 INJECTION, SOLUTION INTRAVENOUS at 00:08

## 2022-11-12 RX ADMIN — DEXAMETHASONE SODIUM PHOSPHATE 10 MG: 10 INJECTION, SOLUTION INTRAMUSCULAR; INTRAVENOUS at 00:41

## 2022-11-12 ASSESSMENT — ACTIVITIES OF DAILY LIVING (ADL)
ADLS_ACUITY_SCORE: 35

## 2022-11-12 NOTE — ED PROVIDER NOTES
History     Chief Complaint   Patient presents with     Vomiting     Altered Mental Status     HPI  David Huang is a 32 year old male who returns to the emergency room for concern of vomiting and altered mental status.  History is limited due to the need for , altered mental status and inability to communicate.  Limited history is obtained using  through coworkers and friends who dropped him off.  They states that he began complaining of a headache, having some nausea and vomiting, and became altered.  Patient was seen in the ER earlier for an infusion only visit.  He is currently receiving IV Rocephin 2 g IV every 12 hours for recent work-up of bacterial meningitis.  He had been hospitalized at this facility from 1/11/2022 through 1/7/2022 for signs of sepsis and presumed bacterial meningitis.  Work-up through his hospitalization had been negative, but after consultation with infectious disease they had recommended he remain on 14 days of IV Rocephin.  He was seen in the ER the day prior by a provider due to recommendations of infectious disease.  Trying to rule out active versus latent TB, but since work-up at that time did not reveal any active TB infection, he was referred to the [facility] department for treatment of latent TB.  He came back last night to receive his scheduled IV Rocephin, and tolerated this well.  Does have a PICC to receive these ongoing infusions, and has approximately 5 days left of antibiotics.  See MDM from visit on 11/10/2022 copied in blue below      Tests were ordered per infectious disease recommendations.  CT shows stable nodules, there is no signs of active TB.  Per their recommendations we will have the patient follow-up with the [facility] to get started on tuberculosis treatment.  CT scan of the head was normal and lumbar puncture was unremarkable.  They recommend continue the patient's dosing for the meningitis,  patient has 5 more days of ceftriaxone.  Patient will be discharged at this time.    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 No pertinent surgical history.    Family History:    Family History   Problem Relation Age of Onset     Other - See Comments Mother         [place] Syndrome       Social History:  Marital Status:  Single [1]  Social History     Tobacco Use     Smoking status: Never     Smokeless tobacco: Never   Substance Use Topics     Alcohol use: Yes     Drug use: Never        Medications:    cefTRIAXone (ROCEPHIN) 2 GM vial  ibuprofen (ADVIL/MOTRIN) 200 MG tablet          Review of Systems   Unable to perform ROS: Acuity of condition       Physical Exam   BP: (!) 108/94  Pulse: 90  Temp: 99.6  F (37.6  C)  Resp: 13  SpO2: 100 %      Physical Exam  Vitals and nursing note reviewed.   Constitutional:       Appearance: He is ill-appearing.      Comments: Arouses to verbal stimuli   HENT:      Head: Normocephalic and atraumatic.      Right Ear: Tympanic membrane normal.      Left Ear: Tympanic membrane normal.      Nose: Nose normal.      Mouth/Throat:      Tongue: No lesions.   Eyes:      General: No scleral icterus.  Cardiovascular:      Rate and  Rhythm: Regular rhythm. Tachycardia present.      Heart sounds: Normal heart sounds.   Pulmonary:      Breath sounds: No stridor.      Comments: Hyperventilating, is redirectable  Abdominal:      General: Bowel sounds are normal.      Palpations: Abdomen is soft.   Musculoskeletal:      Cervical back: Normal range of motion. No rigidity. No pain with movement or spinous process tenderness.   Skin:     General: Skin is warm and dry.      Comments: See photo below.  No petechiae or purpura on lower extremities or abdomen   Neurological:      Mental Status: He is lethargic.         Rash noted at 0509 AM, not noted on arrival    ED Course                 Procedures                         Critical Care time:  was 180 minutes for this patient excluding procedures.     The patient has signs of Severe Sepsis        If one the following conditions is present, a 30 mL/kg bolus is recommended as part of the 6 hour bundle (IBW can be used for BMI >30, or document refusal/contraindication):      1.   Initial hypotension  defined as 2 bps < 90 or map < 65 in the 6hrs before or 3hrs after time zero.     2.  Lactate >4.      The patient has signs of Severe Sepsis as evidenced by:    1. 2 SIRS criteria, AND  2. Suspected infection, AND   3. Organ dysfunction: Lactic Acidosis with value >2.0 and Acute encephalopathy due to sepsis    Time severe sepsis diagnosis confirmed: 2345 11/12/22 as this was the time when Lactate resulted, and the level was > 2.0    3 Hour Severe Sepsis Bundle Completion:    1. Initial Lactic Acid Result:   Recent Labs   Lab Test 11/12/22  0341 11/11/22  2345 11/01/22  1433   LACT 5.5* 4.7* 1.0     2. Blood Cultures before Antibiotics: No, antibiotics were started prior to BCx collection b/c waiting for BCx to be collected would have been detrimental to the patient  3. Broad Spectrum Antibiotics Administered:  yes       Anti-infectives (From admission through now)    Start     Dose/Rate Route Frequency Ordered  Stop    11/12/22 0300  vancomycin (VANCOCIN) 1,500 mg in sodium chloride 0.9 % 250 mL intermittent infusion         1,500 mg  over 90 Minutes Intravenous EVERY 12 HOURS 11/12/22 0243            4. Is initial hypotension present?     No (IV fluid bolus NOT required). IV Fluid volume administered: 2L                    Severe Sepsis reassessment:  1. Repeat Lactic Acid Level within 6 hours of time zero: 5.5  2. MAP>65 after initial IVF bolus, will continue to monitor fluid status and vital signs    I attest to having performed a repeat sepsis exam and assessment of perfusion at 0025 and the results demonstrate no change.         Results for orders placed or performed during the hospital encounter of 11/11/22 (from the past 24 hour(s))   Glucose by meter   Result Value Ref Range    GLUCOSE BY METER POCT 128 (H) 70 - 99 mg/dL   CBC with platelets differential    Narrative    The following orders were created for panel order CBC with platelets differential.  Procedure                               Abnormality         Status                     ---------                               -----------         ------                     CBC with platelets and d...[[id number]]  Abnormal            Final result               Manual Differential[[id number]]          Abnormal            Final result                 Please view results for these tests on the individual orders.   Comprehensive metabolic panel   Result Value Ref Range    Sodium 139 133 - 144 mmol/L    Potassium 2.7 (L) 3.4 - 5.3 mmol/L    Chloride 108 94 - 109 mmol/L    Carbon Dioxide (CO2) 20 20 - 32 mmol/L    Anion Gap 11 3 - 14 mmol/L    Urea Nitrogen 13 7 - 30 mg/dL    Creatinine 0.76 0.66 - 1.25 mg/dL    Calcium 8.7 8.5 - 10.1 mg/dL    Glucose 194 (H) 70 - 99 mg/dL    Alkaline Phosphatase 73 40 - 150 U/L    AST 40 0 - 45 U/L    ALT 51 0 - 70 U/L    Protein Total 7.4 6.8 - 8.8 g/dL    Albumin 3.7 3.4 - 5.0 g/dL    Bilirubin Total 0.2 0.2 - 1.3 mg/dL    GFR Estimate  >90 >60 mL/min/1.73m2   Lactic acid whole blood   Result Value Ref Range    Lactic Acid 4.7 (HH) 0.7 - 2.0 mmol/L   Blood gas venous   Result Value Ref Range    pH Venous 7.50 (H) 7.32 - 7.43    pCO2 Venous 28 (L) 40 - 50 mm Hg    pO2 Venous 31 25 - 47 mm Hg    Bicarbonate Venous 22 21 - 28 mmol/L    Base Excess/Deficit (+/-) 0.4 -7.7 - 1.9 mmol/L    FIO2 21    CRP inflammation   Result Value Ref Range    CRP Inflammation <2.9 0.0 - 8.0 mg/L   Erythrocyte sedimentation rate auto   Result Value Ref Range    Erythrocyte Sedimentation Rate 10 0 - 15 mm/hr   Ethyl Alcohol Level   Result Value Ref Range    Alcohol ethyl <0.01 <=0.01 g/dL   CBC with platelets and differential   Result Value Ref Range    WBC Count 15.5 (H) 4.0 - 11.0 10e3/uL    RBC Count 4.81 4.40 - 5.90 10e6/uL    Hemoglobin 14.5 13.3 - 17.7 g/dL    Hematocrit 41.3 40.0 - 53.0 %    MCV 86 78 - 100 fL    MCH 30.1 26.5 - 33.0 pg    MCHC 35.1 31.5 - 36.5 g/dL    RDW 12.1 10.0 - 15.0 %    Platelet Count 334 150 - 450 10e3/uL    % Neutrophils 48 %    % Lymphocytes 45 %    % Monocytes 5 %    % Eosinophils 1 %    % Basophils 0 %    % Immature Granulocytes 1 %    Absolute Neutrophils 7.5 1.6 - 8.3 10e3/uL    Absolute Lymphocytes 6.9 (H) 0.8 - 5.3 10e3/uL    Absolute Monocytes 0.8 0.0 - 1.3 10e3/uL    Absolute Eosinophils 0.2 0.0 - 0.7 10e3/uL    Absolute Basophils 0.1 0.0 - 0.2 10e3/uL    Absolute Immature Granulocytes 0.1 <=0.4 10e3/uL   Extra Tube    Narrative    The following orders were created for panel order Extra Tube.  Procedure                               Abnormality         Status                     ---------                               -----------         ------                     Extra Blue Top Tube[[id number]]                                                         Extra Red Top Tube[[id number]]                               Final result                 Please view results for these tests on the individual orders.   Extra Red Top Tube   Result  Value Ref Range    Hold Specimen     Manual Differential   Result Value Ref Range    % Neutrophils 53 %    % Lymphocytes 43 %    % Monocytes 4 %    % Eosinophils 0 %    % Basophils 0 %    Absolute Neutrophils 8.2 1.6 - 8.3 10e3/uL    Absolute Lymphocytes 6.7 (H) 0.8 - 5.3 10e3/uL    Absolute Monocytes 0.6 0.0 - 1.3 10e3/uL    Absolute Eosinophils 0.0 0.0 - 0.7 10e3/uL    Absolute Basophils 0.0 0.0 - 0.2 10e3/uL    RBC Morphology Confirmed RBC Indices     Platelet Assessment (A) Automated Count Confirmed. Platelet morphology is normal.     Automated Count Confirmed. Giant platelets are present.    Pathologist Review Comments     Symptomatic; Unknown Influenza A/B & SARS-CoV2 (COVID-19) Virus PCR Multiplex Nose    Specimen: Nose; Swab   Result Value Ref Range    Influenza A PCR Negative Negative    Influenza B PCR Negative Negative    RSV PCR Negative Negative    SARS CoV2 PCR Negative Negative    Narrative    Testing was performed using the Xpert Xpress CoV2/Flu/RSV Assay on the [employer] GeneXpert Instrument. This test should be ordered for the detection of SARS-CoV-2 and influenza viruses in individuals who meet clinical and/or epidemiological criteria. Test performance is unknown in asymptomatic patients. This test is for in vitro diagnostic use under the FDA EUA for laboratories certified under CLIA to perform high or moderate complexity testing. This test has not been FDA cleared or approved. A negative result does not rule out the presence of PCR inhibitors in the specimen or target RNA in concentration below the limit of detection for the assay. If only one viral target is positive but coinfection with multiple targets is suspected, the sample should be re-tested with another FDA cleared, approved, or authorized test, if coinfection would change clinical management. This test was validated by the [facility]. These laboratories are certified under the Clinical Laboratory Improvement Amendments  of 1988 (CLIA-88) as qualified to perform high complexity laboratory testing.   CT Head w/o Contrast    Narrative    EXAM: CT HEAD W/O CONTRAST  LOCATION: [facility]  DATE/TIME: 11/12/2022 12:15 AM    INDICATION: Altered mental status  COMPARISON: 11/10/2022  TECHNIQUE: Routine CT Head without IV contrast. Multiplanar reformats. Dose reduction techniques were used.    FINDINGS:  INTRACRANIAL CONTENTS: No intracranial hemorrhage, extraaxial collection, or mass effect.  No CT evidence of acute infarct. Normal parenchymal attenuation. Normal ventricles and sulci.     VISUALIZED ORBITS/SINUSES/MASTOIDS: No intraorbital abnormality. No paranasal sinus mucosal disease. No middle ear or mastoid effusion.    BONES/SOFT TISSUES: No acute abnormality.      Impression    IMPRESSION:  1.  Unremarkable noncontrast head CT.       Medications   0.9% sodium chloride BOLUS (0 mLs Intravenous Stopped 11/12/22 0136)     Followed by   sodium chloride 0.9% infusion ( Intravenous New Bag 11/12/22 0238)   potassium chloride 10 mEq in 100 mL sterile water infusion (10 mEq Intravenous New Bag 11/12/22 0218)   vancomycin (VANCOCIN) 1,500 mg in sodium chloride 0.9 % 250 mL intermittent infusion (has no administration in time range)   LORazepam (ATIVAN) injection 2 mg (2 mg Intravenous Given 11/12/22 0009)   ketorolac (TORADOL) injection 30 mg (30 mg Intravenous Given 11/12/22 0026)   prochlorperazine (COMPAZINE) injection 10 mg (10 mg Intravenous Given 11/12/22 0039)   diphenhydrAMINE (BENADRYL) injection 25 mg (25 mg Intravenous Given 11/12/22 0037)   magnesium sulfate 1 gm in 100mL D5W intermittent infusion (0 g Intravenous Stopped 11/12/22 0118)   dexamethasone PF (DECADRON) injection 10 mg (10 mg Intravenous Given 11/12/22 0041)   0.9% sodium chloride BOLUS (0 mLs Intravenous Stopped 11/12/22 0236)       Assessments & Plan (with Medical Decision Making)  David is a 32 y.o male returning to the emergency room  for headache,vomiting, and altered mental status. See history and physical exam as above.  Limited history due to language barrier and altered mental status.  Patient had been seen earlier for scheduled infusion of IV Rocephin for treatment of meningitis.  Has had extensive work-up and similar presentation earlier this year in May as well as again earlier this month without underlying cause identified.  Will start broad work-up for evaluation, but patient will likely need to be admitted  Labs and imaging as above.  He now has a leukocytosis which is new from most recent ED visit on 11/10/2022.  Head CT did not reveal any acute concerning findings.  He did become agitated, and had rigors, would not sit still.  Treated with migraine cocktail which did seem to help.  Was also given some Ativan for agitation which helped.  Unfortunately, think patient needs to be admitted for ongoing monitoring, but do not see specific cause.  We will also initiate IV vancomycin since he is currently receiving 2 g of IV Rocephin every 12 hours.  No beds available at this facility, no beds were available within the [facility] system.  There was a bed available at [place], spoke with hospitalist who did accept the patient in transfer.  Patient became agitated again after resting comfortably for several hours while awaiting transfer, but just prior to EMS arrival he was turning over in bed, kept trying to crawl out of bed or flipped over onto his stomach.  2 mg of IV Ativan was ordered again prior to transfer.  This did help settle him somewhat to be able to be loaded onto the stretcher.  Left the department in no acute distress     I have reviewed the nursing notes.    I have reviewed the findings, diagnosis, plan and need for follow up with the patient.       ED to Inpatient Handoff:    Discussed with Dr. Good at 0330  Patient accepted for Inpatient Stay  Pending studies include repeat lactic  Code Status: Not Addressed            New Prescriptions    No medications on file       Final diagnoses:   Hypokalemia   Lymphocytosis   Altered mental status, unspecified altered mental status type   Sepsis with encephalopathy without septic shock, due to unspecified organism (H)       11/11/2022   [facility] EMERGENCY DEPT     Cecile Tavares,   11/13/22 0415

## 2022-11-12 NOTE — ED TRIAGE NOTES
Pt brought in by friend - friend states that pt c/o headache, nausea then vomiting. C/o dizziness, lightheaded, and headache. Pt has become lethargic. VS wnl- breathing WNL RA. Pt brought back to ED 06, provider to see pt.      Triage Assessment     Row Name 11/11/22 8455       Triage Assessment (Adult)    Airway WDL WDL       Respiratory WDL    Respiratory WDL WDL       Cardiac WDL    Cardiac WDL WDL

## 2022-11-12 NOTE — ED NOTES
Called friends to give update on his transfer.  Alexis  [phone]  (this number does not connect, out of service) ... Ezekiel [phone]  left a message to call ER for update.

## 2022-11-15 LAB
BACTERIA CSF CULT: NO GROWTH
BACTERIA CSF CULT: NORMAL
GRAM STAIN RESULT: NORMAL
GRAM STAIN RESULT: NORMAL

## 2022-11-17 LAB
BACTERIA BLD CULT: NO GROWTH
BACTERIA BLD CULT: NO GROWTH

## 2023-01-07 LAB
ACID FAST STAIN (ARUP): NORMAL